# Patient Record
Sex: FEMALE | Race: ASIAN | NOT HISPANIC OR LATINO | URBAN - METROPOLITAN AREA
[De-identification: names, ages, dates, MRNs, and addresses within clinical notes are randomized per-mention and may not be internally consistent; named-entity substitution may affect disease eponyms.]

---

## 2022-10-08 ENCOUNTER — EMERGENCY (EMERGENCY)
Facility: HOSPITAL | Age: 42
LOS: 1 days | Discharge: HOME | End: 2022-10-08
Attending: EMERGENCY MEDICINE
Payer: MEDICARE

## 2022-10-08 VITALS
HEIGHT: 57 IN | TEMPERATURE: 99 F | RESPIRATION RATE: 20 BRPM | HEART RATE: 66 BPM | WEIGHT: 115.08 LBS | OXYGEN SATURATION: 100 % | SYSTOLIC BLOOD PRESSURE: 90 MMHG | DIASTOLIC BLOOD PRESSURE: 63 MMHG

## 2022-10-08 LAB
ALBUMIN SERPL ELPH-MCNC: 3.2 G/DL — LOW (ref 3.5–5)
ALP SERPL-CCNC: 87 U/L — SIGNIFICANT CHANGE UP (ref 40–120)
ALT FLD-CCNC: 43 U/L DA — SIGNIFICANT CHANGE UP (ref 10–60)
ANION GAP SERPL CALC-SCNC: 9 MMOL/L — SIGNIFICANT CHANGE UP (ref 5–17)
APTT BLD: 30.1 SEC — SIGNIFICANT CHANGE UP (ref 27.5–35.5)
AST SERPL-CCNC: 35 U/L — SIGNIFICANT CHANGE UP (ref 10–40)
BASOPHILS # BLD AUTO: 0.05 K/UL — SIGNIFICANT CHANGE UP (ref 0–0.2)
BASOPHILS NFR BLD AUTO: 0.3 % — SIGNIFICANT CHANGE UP (ref 0–2)
BILIRUB SERPL-MCNC: 0.4 MG/DL — SIGNIFICANT CHANGE UP (ref 0.2–1.2)
BUN SERPL-MCNC: 19 MG/DL — HIGH (ref 7–18)
CALCIUM SERPL-MCNC: 9.1 MG/DL — SIGNIFICANT CHANGE UP (ref 8.4–10.5)
CHLORIDE SERPL-SCNC: 100 MMOL/L — SIGNIFICANT CHANGE UP (ref 96–108)
CO2 SERPL-SCNC: 29 MMOL/L — SIGNIFICANT CHANGE UP (ref 22–31)
CREAT SERPL-MCNC: 1.08 MG/DL — SIGNIFICANT CHANGE UP (ref 0.5–1.3)
EGFR: 66 ML/MIN/1.73M2 — SIGNIFICANT CHANGE UP
EOSINOPHIL # BLD AUTO: 0 K/UL — SIGNIFICANT CHANGE UP (ref 0–0.5)
EOSINOPHIL NFR BLD AUTO: 0 % — SIGNIFICANT CHANGE UP (ref 0–6)
GLUCOSE SERPL-MCNC: 115 MG/DL — HIGH (ref 70–99)
HCG SERPL-ACNC: 1 MIU/ML — SIGNIFICANT CHANGE UP
HCT VFR BLD CALC: 43.2 % — SIGNIFICANT CHANGE UP (ref 34.5–45)
HGB BLD-MCNC: 14.6 G/DL — SIGNIFICANT CHANGE UP (ref 11.5–15.5)
IMM GRANULOCYTES NFR BLD AUTO: 0.5 % — SIGNIFICANT CHANGE UP (ref 0–0.9)
INR BLD: 1.17 RATIO — HIGH (ref 0.88–1.16)
LACTATE SERPL-SCNC: 1.8 MMOL/L — SIGNIFICANT CHANGE UP (ref 0.7–2)
LYMPHOCYTES # BLD AUTO: 1.24 K/UL — SIGNIFICANT CHANGE UP (ref 1–3.3)
LYMPHOCYTES # BLD AUTO: 7.3 % — LOW (ref 13–44)
MCHC RBC-ENTMCNC: 33.1 PG — SIGNIFICANT CHANGE UP (ref 27–34)
MCHC RBC-ENTMCNC: 33.8 GM/DL — SIGNIFICANT CHANGE UP (ref 32–36)
MCV RBC AUTO: 98 FL — SIGNIFICANT CHANGE UP (ref 80–100)
MONOCYTES # BLD AUTO: 0.71 K/UL — SIGNIFICANT CHANGE UP (ref 0–0.9)
MONOCYTES NFR BLD AUTO: 4.2 % — SIGNIFICANT CHANGE UP (ref 2–14)
NEUTROPHILS # BLD AUTO: 14.87 K/UL — HIGH (ref 1.8–7.4)
NEUTROPHILS NFR BLD AUTO: 87.7 % — HIGH (ref 43–77)
NRBC # BLD: 0 /100 WBCS — SIGNIFICANT CHANGE UP (ref 0–0)
PLATELET # BLD AUTO: 305 K/UL — SIGNIFICANT CHANGE UP (ref 150–400)
POTASSIUM SERPL-MCNC: 4.6 MMOL/L — SIGNIFICANT CHANGE UP (ref 3.5–5.3)
POTASSIUM SERPL-SCNC: 4.6 MMOL/L — SIGNIFICANT CHANGE UP (ref 3.5–5.3)
PROT SERPL-MCNC: 8.1 G/DL — SIGNIFICANT CHANGE UP (ref 6–8.3)
PROTHROM AB SERPL-ACNC: 13.9 SEC — HIGH (ref 10.5–13.4)
RBC # BLD: 4.41 M/UL — SIGNIFICANT CHANGE UP (ref 3.8–5.2)
RBC # FLD: 13 % — SIGNIFICANT CHANGE UP (ref 10.3–14.5)
SARS-COV-2 RNA SPEC QL NAA+PROBE: SIGNIFICANT CHANGE UP
SODIUM SERPL-SCNC: 138 MMOL/L — SIGNIFICANT CHANGE UP (ref 135–145)
TROPONIN I, HIGH SENSITIVITY RESULT: 4.8 NG/L — SIGNIFICANT CHANGE UP
WBC # BLD: 16.95 K/UL — HIGH (ref 3.8–10.5)
WBC # FLD AUTO: 16.95 K/UL — HIGH (ref 3.8–10.5)

## 2022-10-08 PROCEDURE — 71045 X-RAY EXAM CHEST 1 VIEW: CPT | Mod: 26

## 2022-10-08 PROCEDURE — 99285 EMERGENCY DEPT VISIT HI MDM: CPT

## 2022-10-08 RX ORDER — SODIUM CHLORIDE 9 MG/ML
1000 INJECTION INTRAMUSCULAR; INTRAVENOUS; SUBCUTANEOUS ONCE
Refills: 0 | Status: COMPLETED | OUTPATIENT
Start: 2022-10-08 | End: 2022-10-08

## 2022-10-08 RX ADMIN — SODIUM CHLORIDE 1000 MILLILITER(S): 9 INJECTION INTRAMUSCULAR; INTRAVENOUS; SUBCUTANEOUS at 21:10

## 2022-10-08 NOTE — ED PROVIDER NOTE - PROGRESS NOTE DETAILS
no source found. will treat as if strep. dw mom. pending cxs will dc. feeling better and wants to go home, reviewed case and results w mom, questions answered and she understands, advised return precautions and care plan.

## 2022-10-08 NOTE — ED PROVIDER NOTE - WET READ LAUNCH FT
[FreeTextEntry1] : 64 yo P2 for annual exam. Denies any complaints at this time, denies any PMB. Last mammogram was 10/2020, birads 2. Last colonoscopy 8 years ago. 
There are no Wet Read(s) to document.

## 2022-10-08 NOTE — ED PROVIDER NOTE - CLINICAL SUMMARY MEDICAL DECISION MAKING FREE TEXT BOX
Will start w/ basic blood work and COVID swab and chest X-ray. Patient is well-appearing and nonfocal exam. Will reassess after blood work if no other findings.

## 2022-10-08 NOTE — ED PROVIDER NOTE - NSFOLLOWUPINSTRUCTIONS_ED_ALL_ED_FT
IMPORTANT INSTRUCTIONS FROM Dr. RODRIGUEZ:    Please follow up with your personal medical doctor in 24-48 hours.   Bring results from today to your visit.    Antibiotics as prescribed.    If you were advised to take any medications - be sure to review the package insert.    If your symptoms change, get worse or if you have any new symptoms, come to the ER right away.  If you have any questions, call the ER at the phone number on this page.

## 2022-10-08 NOTE — ED PROVIDER NOTE - OBJECTIVE STATEMENT
42 y/o female, history of Down syndrome, brought into hospital by mom, is nonverbal and cannot contribute the history. Mom says that she has been "shaking" on Sunday when she was seen another hospital (about a week ago). She has been having a little bit more drooling than usual. No other focal symptoms, no sick contacts, no stomach pain; however, observing her coughing as she sits in the bed. Patient is having active chills in the bed. No known drug allergies.

## 2022-10-08 NOTE — ED PROVIDER NOTE - PATIENT PORTAL LINK FT
You can access the FollowMyHealth Patient Portal offered by [facility] by registering at the following website: [email or web address]. By joining [employer]’s FollowMyHealth portal, you will also be able to view your health information using other applications (apps) compatible with our system.

## 2022-10-08 NOTE — ED PROVIDER NOTE - CPE EDP RESP NORM
This patient was evaluated for sepsis.  At this time, a diagnosis of sepsis is not supported by the overall clinical picture. normal...

## 2022-10-09 VITALS
TEMPERATURE: 98 F | OXYGEN SATURATION: 95 % | DIASTOLIC BLOOD PRESSURE: 72 MMHG | SYSTOLIC BLOOD PRESSURE: 101 MMHG | HEART RATE: 69 BPM | RESPIRATION RATE: 18 BRPM

## 2022-10-09 LAB
RAPID RVP RESULT: SIGNIFICANT CHANGE UP
SARS-COV-2 RNA SPEC QL NAA+PROBE: SIGNIFICANT CHANGE UP

## 2022-10-09 PROCEDURE — 85730 THROMBOPLASTIN TIME PARTIAL: CPT

## 2022-10-09 PROCEDURE — 84702 CHORIONIC GONADOTROPIN TEST: CPT

## 2022-10-09 PROCEDURE — 96376 TX/PRO/DX INJ SAME DRUG ADON: CPT | Mod: XU

## 2022-10-09 PROCEDURE — 96374 THER/PROPH/DIAG INJ IV PUSH: CPT | Mod: XU

## 2022-10-09 PROCEDURE — 87040 BLOOD CULTURE FOR BACTERIA: CPT

## 2022-10-09 PROCEDURE — 80053 COMPREHEN METABOLIC PANEL: CPT

## 2022-10-09 PROCEDURE — 84484 ASSAY OF TROPONIN QUANT: CPT

## 2022-10-09 PROCEDURE — 70491 CT SOFT TISSUE NECK W/DYE: CPT | Mod: 26,MA

## 2022-10-09 PROCEDURE — 70491 CT SOFT TISSUE NECK W/DYE: CPT | Mod: MA

## 2022-10-09 PROCEDURE — 85025 COMPLETE CBC W/AUTO DIFF WBC: CPT

## 2022-10-09 PROCEDURE — 36415 COLL VENOUS BLD VENIPUNCTURE: CPT

## 2022-10-09 PROCEDURE — 83605 ASSAY OF LACTIC ACID: CPT

## 2022-10-09 PROCEDURE — 87635 SARS-COV-2 COVID-19 AMP PRB: CPT

## 2022-10-09 PROCEDURE — 85610 PROTHROMBIN TIME: CPT

## 2022-10-09 PROCEDURE — 99285 EMERGENCY DEPT VISIT HI MDM: CPT | Mod: 25

## 2022-10-09 PROCEDURE — 0225U NFCT DS DNA&RNA 21 SARSCOV2: CPT

## 2022-10-09 PROCEDURE — 71045 X-RAY EXAM CHEST 1 VIEW: CPT

## 2022-10-09 RX ORDER — AMOXICILLIN 250 MG/5ML
1 SUSPENSION, RECONSTITUTED, ORAL (ML) ORAL
Qty: 14 | Refills: 0
Start: 2022-10-09 | End: 2022-10-15

## 2022-10-09 RX ORDER — ACETAMINOPHEN 500 MG
650 TABLET ORAL ONCE
Refills: 0 | Status: COMPLETED | OUTPATIENT
Start: 2022-10-09 | End: 2022-10-09

## 2022-10-09 RX ADMIN — Medication 1 MILLIGRAM(S): at 00:55

## 2022-10-09 RX ADMIN — Medication 1 TABLET(S): at 02:59

## 2022-10-09 RX ADMIN — Medication 0.5 MILLIGRAM(S): at 00:10

## 2022-10-14 LAB
CULTURE RESULTS: SIGNIFICANT CHANGE UP
CULTURE RESULTS: SIGNIFICANT CHANGE UP
SPECIMEN SOURCE: SIGNIFICANT CHANGE UP
SPECIMEN SOURCE: SIGNIFICANT CHANGE UP

## 2022-10-18 PROBLEM — Z00.00 ENCOUNTER FOR PREVENTIVE HEALTH EXAMINATION: Status: ACTIVE | Noted: 2022-10-18

## 2022-10-20 ENCOUNTER — INPATIENT (INPATIENT)
Facility: HOSPITAL | Age: 42
LOS: 4 days | Discharge: HOME | DRG: 100 | End: 2022-10-25
Attending: STUDENT IN AN ORGANIZED HEALTH CARE EDUCATION/TRAINING PROGRAM | Admitting: STUDENT IN AN ORGANIZED HEALTH CARE EDUCATION/TRAINING PROGRAM
Payer: MEDICARE

## 2022-10-20 VITALS
SYSTOLIC BLOOD PRESSURE: 103 MMHG | DIASTOLIC BLOOD PRESSURE: 48 MMHG | HEIGHT: 57 IN | HEART RATE: 60 BPM | WEIGHT: 119.93 LBS | RESPIRATION RATE: 22 BRPM | OXYGEN SATURATION: 100 %

## 2022-10-20 LAB
ALBUMIN SERPL ELPH-MCNC: 2.8 G/DL — LOW (ref 3.5–5)
ALP SERPL-CCNC: 79 U/L — SIGNIFICANT CHANGE UP (ref 40–120)
ALT FLD-CCNC: 40 U/L DA — SIGNIFICANT CHANGE UP (ref 10–60)
ANION GAP SERPL CALC-SCNC: 7 MMOL/L — SIGNIFICANT CHANGE UP (ref 5–17)
APPEARANCE UR: CLEAR — SIGNIFICANT CHANGE UP
AST SERPL-CCNC: 53 U/L — HIGH (ref 10–40)
BASOPHILS # BLD AUTO: 0.04 K/UL — SIGNIFICANT CHANGE UP (ref 0–0.2)
BASOPHILS NFR BLD AUTO: 1 % — SIGNIFICANT CHANGE UP (ref 0–2)
BILIRUB SERPL-MCNC: 0.3 MG/DL — SIGNIFICANT CHANGE UP (ref 0.2–1.2)
BILIRUB UR-MCNC: NEGATIVE — SIGNIFICANT CHANGE UP
BUN SERPL-MCNC: 22 MG/DL — HIGH (ref 7–18)
CALCIUM SERPL-MCNC: 8.3 MG/DL — LOW (ref 8.4–10.5)
CHLORIDE SERPL-SCNC: 104 MMOL/L — SIGNIFICANT CHANGE UP (ref 96–108)
CO2 SERPL-SCNC: 26 MMOL/L — SIGNIFICANT CHANGE UP (ref 22–31)
COLOR SPEC: YELLOW — SIGNIFICANT CHANGE UP
CREAT SERPL-MCNC: 1 MG/DL — SIGNIFICANT CHANGE UP (ref 0.5–1.3)
DIFF PNL FLD: NEGATIVE — SIGNIFICANT CHANGE UP
EGFR: 73 ML/MIN/1.73M2 — SIGNIFICANT CHANGE UP
EOSINOPHIL # BLD AUTO: 0.01 K/UL — SIGNIFICANT CHANGE UP (ref 0–0.5)
EOSINOPHIL NFR BLD AUTO: 0.3 % — SIGNIFICANT CHANGE UP (ref 0–6)
GLUCOSE SERPL-MCNC: 89 MG/DL — SIGNIFICANT CHANGE UP (ref 70–99)
GLUCOSE UR QL: NEGATIVE — SIGNIFICANT CHANGE UP
HCT VFR BLD CALC: 43.3 % — SIGNIFICANT CHANGE UP (ref 34.5–45)
HGB BLD-MCNC: 14.4 G/DL — SIGNIFICANT CHANGE UP (ref 11.5–15.5)
IMM GRANULOCYTES NFR BLD AUTO: 0.5 % — SIGNIFICANT CHANGE UP (ref 0–0.9)
KETONES UR-MCNC: NEGATIVE — SIGNIFICANT CHANGE UP
LEUKOCYTE ESTERASE UR-ACNC: NEGATIVE — SIGNIFICANT CHANGE UP
LYMPHOCYTES # BLD AUTO: 1.74 K/UL — SIGNIFICANT CHANGE UP (ref 1–3.3)
LYMPHOCYTES # BLD AUTO: 43.5 % — SIGNIFICANT CHANGE UP (ref 13–44)
MAGNESIUM SERPL-MCNC: 2.1 MG/DL — SIGNIFICANT CHANGE UP (ref 1.6–2.6)
MCHC RBC-ENTMCNC: 32.7 PG — SIGNIFICANT CHANGE UP (ref 27–34)
MCHC RBC-ENTMCNC: 33.3 GM/DL — SIGNIFICANT CHANGE UP (ref 32–36)
MCV RBC AUTO: 98.4 FL — SIGNIFICANT CHANGE UP (ref 80–100)
MONOCYTES # BLD AUTO: 0.38 K/UL — SIGNIFICANT CHANGE UP (ref 0–0.9)
MONOCYTES NFR BLD AUTO: 9.5 % — SIGNIFICANT CHANGE UP (ref 2–14)
NEUTROPHILS # BLD AUTO: 1.81 K/UL — SIGNIFICANT CHANGE UP (ref 1.8–7.4)
NEUTROPHILS NFR BLD AUTO: 45.2 % — SIGNIFICANT CHANGE UP (ref 43–77)
NITRITE UR-MCNC: NEGATIVE — SIGNIFICANT CHANGE UP
NRBC # BLD: 0 /100 WBCS — SIGNIFICANT CHANGE UP (ref 0–0)
PH UR: 7 — SIGNIFICANT CHANGE UP (ref 5–8)
PLATELET # BLD AUTO: 181 K/UL — SIGNIFICANT CHANGE UP (ref 150–400)
POTASSIUM SERPL-MCNC: 5.3 MMOL/L — SIGNIFICANT CHANGE UP (ref 3.5–5.3)
POTASSIUM SERPL-SCNC: 5.3 MMOL/L — SIGNIFICANT CHANGE UP (ref 3.5–5.3)
PROT SERPL-MCNC: 7.2 G/DL — SIGNIFICANT CHANGE UP (ref 6–8.3)
PROT UR-MCNC: NEGATIVE — SIGNIFICANT CHANGE UP
RBC # BLD: 4.4 M/UL — SIGNIFICANT CHANGE UP (ref 3.8–5.2)
RBC # FLD: 12.7 % — SIGNIFICANT CHANGE UP (ref 10.3–14.5)
SARS-COV-2 RNA SPEC QL NAA+PROBE: SIGNIFICANT CHANGE UP
SODIUM SERPL-SCNC: 137 MMOL/L — SIGNIFICANT CHANGE UP (ref 135–145)
SP GR SPEC: 1 — LOW (ref 1.01–1.02)
UROBILINOGEN FLD QL: NEGATIVE — SIGNIFICANT CHANGE UP
VALPROATE SERPL-MCNC: 80 UG/ML — SIGNIFICANT CHANGE UP (ref 50–100)
WBC # BLD: 4 K/UL — SIGNIFICANT CHANGE UP (ref 3.8–10.5)
WBC # FLD AUTO: 4 K/UL — SIGNIFICANT CHANGE UP (ref 3.8–10.5)

## 2022-10-20 PROCEDURE — 71045 X-RAY EXAM CHEST 1 VIEW: CPT | Mod: 26

## 2022-10-20 PROCEDURE — 99285 EMERGENCY DEPT VISIT HI MDM: CPT

## 2022-10-20 RX ORDER — ACETAMINOPHEN 500 MG
1000 TABLET ORAL ONCE
Refills: 0 | Status: COMPLETED | OUTPATIENT
Start: 2022-10-20 | End: 2022-10-20

## 2022-10-20 RX ORDER — VALPROIC ACID (AS SODIUM SALT) 250 MG/5ML
500 SOLUTION, ORAL ORAL ONCE
Refills: 0 | Status: COMPLETED | OUTPATIENT
Start: 2022-10-20 | End: 2022-10-20

## 2022-10-20 RX ORDER — VALPROIC ACID (AS SODIUM SALT) 250 MG/5ML
500 SOLUTION, ORAL ORAL ONCE
Refills: 0 | Status: DISCONTINUED | OUTPATIENT
Start: 2022-10-20 | End: 2022-10-20

## 2022-10-20 RX ORDER — MIDAZOLAM HYDROCHLORIDE 1 MG/ML
2 INJECTION, SOLUTION INTRAMUSCULAR; INTRAVENOUS ONCE
Refills: 0 | Status: DISCONTINUED | OUTPATIENT
Start: 2022-10-20 | End: 2022-10-20

## 2022-10-20 RX ADMIN — Medication 400 MILLIGRAM(S): at 22:33

## 2022-10-20 RX ADMIN — Medication 1000 MILLIGRAM(S): at 23:03

## 2022-10-20 RX ADMIN — Medication 500 MILLIGRAM(S): at 21:32

## 2022-10-20 RX ADMIN — MIDAZOLAM HYDROCHLORIDE 2 MILLIGRAM(S): 1 INJECTION, SOLUTION INTRAMUSCULAR; INTRAVENOUS at 21:37

## 2022-10-20 RX ADMIN — Medication 2 MILLIGRAM(S): at 22:33

## 2022-10-20 NOTE — ED ADULT NURSE NOTE - NSIMPLEMENTINTERV_GEN_ALL_ED
Implemented All Universal Safety Interventions:  [place] to call system. Call bell, personal items and telephone within reach. Instruct patient to call for assistance. Room bathroom lighting operational. Non-slip footwear when patient is off stretcher. Physically safe environment: no spills, clutter or unnecessary equipment. Stretcher in lowest position, wheels locked, appropriate side rails in place.

## 2022-10-20 NOTE — ED PROVIDER NOTE - CLINICAL SUMMARY MEDICAL DECISION MAKING FREE TEXT BOX
patient with seizure like activity today. plan for labs CT scan and admit for neuro workup patient with seizure like activity today. plan for labs CT scan and admit for neuro workup.    [place] 0343:  Pt s/o from Dr Red pending CT head and admission. Multiple meds required to obtain CT head as pt persistently moving and not following commands. CT showing no acute path. Giving keppra IVPB. Pt stable and endorsed to inpatient team.

## 2022-10-20 NOTE — CONSULT NOTE ADULT - SUBJECTIVE AND OBJECTIVE BOX
NEUROLOGY CONSULT NOTE    NAME:  BRENDA RUSSO      ASSESSMENT:  41F with concern for recurrent seizures in the setting of Down syndrome and early-onset dementia      RECOMMENDATIONS:    - Follow up CT Head for any acute intracranial abnormalities    - Routine EEG approved to evaluate for subclinical seizures    - Continue Valproic acid syrup (Depakene) 500mg PO BID (or Valproate 500mg IV BID if not tolerating PO medications) for seizure prophylaxis - Valproic acid level is within the normal range    - If Routine EEG reveals subclinical seizures, Valproic acid dosage can be increased to 750mg PO BID    - Continue home Donepezil 5mg PO Daily to help slow down cognitive decline    - Complete infectious and metabolic workup for potential triggers for breakthrough seizure, and treat as appropriate    - PT/OT as tolerated    - DVT ppx: SCDs, Enoxaparin or Heparin          NOTE TO BE COMPLETED - PLEASE REFER TO ABOVE ONLY AND IGNORE INFORMATION BELOW    *******************************      CHIEF COMPLAINT:  Patient is a 41y old  Female who presents with a chief complaint of     HPI / NEURO HPI:  History is provided by the patient's mother at bedside. This is a 41-year-old woman with Down syndrome and early-onset dementia, on Donepezil, who presented to the ED on 10/8/22 with generalized shaking, and was diagnosed with seizure and started on Valproic acid 500mg PO BID. She presented today with another witnessed episode of generalized shaking.      PAST MEDICAL & SURGICAL HISTORY:  Down syndrome  Seizure  No significant past surgical history      MEDICATIONS:  Valproic acid 500mg PO BID      ALLERGIES:  No Known Allergies      FAMILY HISTORY:        SOCIAL HISTORY:  Denies alcohol, tobacco, or illicit drug use      REVIEW OF SYSTEMS:  GENERAL: No fever, weight changes, fatigue  EYES: No eye pain or discharge  EAR/NOSE/MOUTH/THROAT: No sinus or throat pain; No difficulty hearing  NECK: No pain or stiffness  RESPIRATORY: No cough, wheezing, chills, or hemoptysis  CARDIOVASCULAR: No chest pain, palpitations, shortness of breath, or dyspnea on exertion  GASTROINTESTINAL: No abdominal pain, nausea, vomiting, hematemesis, diarrhea, or constipation  GENITOURINARY: No dysuria, frequency, hematuria, or incontinence  SKIN: No rashes or lesions  ENDOCRINE: No heat or cold intolerance  HEMATOLOGIC: No easy bruising or bleeding  PSYCHIATRIC: No depression, anxiety, or mood swings  MUSCULOSKELETAL: No joint pain or swelling  NEUROLOGICAL: As per HPI        OBJECTIVE:    Vital Signs Last 24 Hrs  T(C): 36.8 (21 Oct 2022 00:55), Max: 36.8 (20 Oct 2022 20:50)  T(F): 98.3 (21 Oct 2022 00:55), Max: 98.3 (20 Oct 2022 20:50)  HR: 93 (21 Oct 2022 02:35) (60 - 93)  BP: 99/58 (21 Oct 2022 02:35) (99/58 - 114/50)  BP(mean): 71 (21 Oct 2022 02:35) (71 - 71)  RR: 20 (21 Oct 2022 02:35) (18 - 22)  SpO2: 100% (21 Oct 2022 02:35) (95% - 100%)  Parameters below as of 21 Oct 2022 02:35  Patient On (Oxygen Delivery Method): room air    General Examination:  General: No acute distress  HEENT: Atraumatic, Normocephalic, Mildly dysmorphic facies present  Respiratory: Rapid rate, Diminished breath sounds b/l  Cardiovascular: RRR.  Normal S1 & S2.  Normal b/l radial and pedal pulses.    Neurological Examination:  General / Mental Status: AAO x 3.  No aphasia or dysarthria.  Naming and repetition intact.  Cranial Nerves: VFF x 4.  PERRL.  EOMI x 2, No nystagmus or diplopia.  B/l V1-V3 equal and intact to light touch and pinprick.  Symmetric facial movement and palate elevation.  B/l hearing equal to finger rub.  5/5 strength with b/l sternocleidomastoid & trapezius.  Midline tongue protrusion, with no atrophy or fasciculations.  Motor: Normal bulk & tone in all four extremities.  5/5 strength throughout all four extremities.  No downward drift, rigidity, spasticity, or tremors in any of the four extremities.  Sensory: Intact to light touch and pinprick in all four extremities.  Negative Romberg.  Reflex: 2+ and symmetric at b/l biceps, triceps, brachioradialis, patellae, and ankles.  Downgoing toes b/l.  Coordination: No dysmetria with b/l finger-to-nose and heel raise tests.  Symmetric rapid alternating movements b/l.  Gait: Normal, narrow-based gait.  No difficulty with tiptoe, heel, and tandem gaits.        LABORATORY VALUES:                        14.4   4.00  )-----------( 181      ( 20 Oct 2022 19:45 )             43.3       10-20    137  |  104  |  22<H>  ----------------------------<  89  5.3   |  26  |  1.00    Ca    8.3<L>      20 Oct 2022 19:45  Mg     2.1     10-20    TPro  7.2  /  Alb  2.8<L>  /  TBili  0.3  /  DBili  x   /  AST  53<H>  /  ALT  40  /  AlkPhos  79  10-20  Valproic Acid Level, Serum: 80 ug/mL (10.20.22 @ 20:10)     Urinalysis (10.20.22 @ 23:10)   pH Urine: 7.0   Glucose Qualitative, Urine: Negative   Blood, Urine: Negative   Color: Yellow   Urine Appearance: Clear   Bilirubin: Negative   Ketone - Urine: Negative   Specific Gravity: 1.005   Protein, Urine: Negative   Urobilinogen: Negative   Nitrite: Negative   Leukocyte Esterase Concentration: Negative               NEUROIMAGING:          Please contact the Neurology consult service with any neurological questions.    Sourav Cardenas MD   of Neurology  [facility] at [facility] NEUROLOGY CONSULT NOTE    NAME:  BRENDA RUSSO      ASSESSMENT:  41F with concern for recurrent seizures in the setting of Down syndrome and early-onset dementia      RECOMMENDATIONS:    - Follow up CT Head for any acute intracranial abnormalities    - Routine EEG approved to evaluate for subclinical seizures    - Continue Valproic acid syrup (Depakene) 500mg PO BID (or Valproate 500mg IV BID if not tolerating PO medications) for seizure prophylaxis - Valproic acid level is within the normal range    - If Routine EEG reveals subclinical seizures, Valproic acid dosage can be increased to 750mg PO/IV BID    - Continue home Donepezil 5mg PO Daily to help slow down cognitive decline    - Complete infectious and metabolic workup for potential triggers for breakthrough seizure, and treat as appropriate    - PT/OT as tolerated    - DVT ppx: SCDs, Enoxaparin or Heparin            *****************************      CHIEF COMPLAINT:  Patient is a 41y old  Female who presents with a chief complaint of seizure    HPI / NEURO HPI:  History is provided by the patient's mother at bedside. This is a 41-year-old woman with Down syndrome and early-onset dementia, on Donepezil, who presented to the ED on 10/8/22 with generalized shaking, and was diagnosed with seizure and started on Valproic acid 500mg PO BID. She presented today with another witnessed episode of generalized shaking.      PAST MEDICAL & SURGICAL HISTORY:  Down syndrome  Seizure  No significant past surgical history      MEDICATIONS:  Valproic acid 500mg PO BID      ALLERGIES:  No Known Allergies      FAMILY HISTORY:  No reported family history of seizure      SOCIAL HISTORY:  No reported alcohol, tobacco, or illicit drug use      REVIEW OF SYSTEMS: Limited due to encephalopathy  GENERAL: No fever  EYES: No eye discharge  EAR/NOSE/MOUTH/THROAT: No sinus discharge  NECK: No stiffness  RESPIRATORY: No cough  CARDIOVASCULAR: No shortness of breath  GASTROINTESTINAL: No nausea, vomiting, hematemesis  GENITOURINARY: No frequency, hematuria  SKIN: No rashes or lesions  ENDOCRINE: No reported heat or cold intolerance  HEMATOLOGIC: No reported easy bruising or bleeding  PSYCHIATRIC: Early-onset dementia present  MUSCULOSKELETAL: No joint swelling  NEUROLOGICAL: As per HPI          OBJECTIVE:    Vital Signs Last 24 Hrs  T(C): 36.8 (21 Oct 2022 00:55), Max: 36.8 (20 Oct 2022 20:50)  T(F): 98.3 (21 Oct 2022 00:55), Max: 98.3 (20 Oct 2022 20:50)  HR: 93 (21 Oct 2022 02:35) (60 - 93)  BP: 99/58 (21 Oct 2022 02:35) (99/58 - 114/50)  BP(mean): 71 (21 Oct 2022 02:35) (71 - 71)  RR: 20 (21 Oct 2022 02:35) (18 - 22)  SpO2: 100% (21 Oct 2022 02:35) (95% - 100%)  Parameters below as of 21 Oct 2022 02:35  Patient On (Oxygen Delivery Method): room air    General Exam:  General: No apparent acute distress  Respiratory: Rapid rate, Diminished breath sounds b/l  Cardiovascular: RRR, Weak b/l radial and pedal pulses    Neurological Exam:  General / Mental Status: Nonverbal, Does not follow commands.  Neurological exam is limited.  Cranial Nerves: PERRL, Blink to threat sluggish b/l, Does not track finger movements with eyes b/l.  Grimaces to pinprick at b/l V1-V3.  No response to snap at b/l ears.  No apparent facial asymmetry.  Midline palate and tongue.  No resistance to passive motion of neck b/l.  Motor: Diminished bulk and tone in all four extremities.  All four extremities drift to bed after passive elevation.  No spontaneous movement, rigidity, or spasticity in any of the four extremities.  Sensation: Withdraws all four extremities to pinch.  Reflexes: 1+ and symmetric at b/l biceps, triceps, brachioradialis, patellae, and ankles.  Toes mute b/l.  Unable to assess coordination, Romberg sign, or gait due to encephalopathy.          LABORATORY VALUES:                        14.4   4.00  )-----------( 181      ( 20 Oct 2022 19:45 )             43.3       10-20    137  |  104  |  22<H>  ----------------------------<  89  5.3   |  26  |  1.00    Ca    8.3<L>      20 Oct 2022 19:45  Mg     2.1     10-20    TPro  7.2  /  Alb  2.8<L>  /  TBili  0.3  /  DBili  x   /  AST  53<H>  /  ALT  40  /  AlkPhos  79  10-20  Valproic Acid Level, Serum: 80 ug/mL (10.20.22 @ 20:10)     Urinalysis (10.20.22 @ 23:10)   pH Urine: 7.0   Glucose Qualitative, Urine: Negative   Blood, Urine: Negative   Color: Yellow   Urine Appearance: Clear   Bilirubin: Negative   Ketone - Urine: Negative   Specific Gravity: 1.005   Protein, Urine: Negative   Urobilinogen: Negative   Nitrite: Negative   Leukocyte Esterase Concentration: Negative             NEUROIMAGING: None recent              Please contact the Neurology consult service with any neurological questions.    Sourav Cardenas MD   of Neurology  [facility]

## 2022-10-20 NOTE — CONSULT NOTE ADULT - TIME BILLING
I counseled the patient's mother at bedside and the primary team about the further testing indicated for breakthrough seizure workup.

## 2022-10-20 NOTE — ED PROVIDER NOTE - OBJECTIVE STATEMENT
Reason for Call:  Call back    Detailed comments: Patients son called in to leave a message for Emmy and care team. States Emmy is aware patient is on short term disability, asks that the team reach out to his dad in regards to therapy services. Wants recommendations as Emmy knows all of his current issues, but is also wanting to make sure it would be covered by insurance. Did advise to check with insurance for coverage.     Phone Number Patient can be reached at: Home number on file [phone] (home)    Best Time: Open    Can we leave a detailed message on this number? YES    Call taken on 5/16/2022 at 12:40 PM by Teresa Trejo     41-year-old female with Down syndrome brought in by ambulance for seizure-like activity.  According to triage note EMS witnessed seizure like activity for 20 minutes.  Mom states for the past few weeks the patient has been having generalized weakness and seem to be uncomfortable which has gotten worse over the last several days.  Several weeks ago she was started on Keppra 500 mg twice a day.  Patient does not have a neurologist.  Mother notes good appetite no vomiting no diarrhea no fever no cough.  Patient is nonverbal for the last several years since COVID.  Mother denies trauma.  Mother notes the patient is having too much shaking.

## 2022-10-20 NOTE — ED PROVIDER NOTE - PROGRESS NOTE DETAILS
I spoke with Dr Cardenas who will see patient in am. recommended regular keppra dose. patient required versed then ativan for anxiolysis. pending CT and urinalysis, then admit for seizure workup

## 2022-10-20 NOTE — ED PROVIDER NOTE - PHYSICAL EXAMINATION
Awake, nonfocal neuro exam appears uncomfortable, abdomen nontender, no rash no petechiae. no bruising. notes involuntary hand shaking on and off (not seizure like)

## 2022-10-21 DIAGNOSIS — Z29.9 ENCOUNTER FOR PROPHYLACTIC MEASURES, UNSPECIFIED: ICD-10-CM

## 2022-10-21 DIAGNOSIS — R56.9 UNSPECIFIED CONVULSIONS: ICD-10-CM

## 2022-10-21 DIAGNOSIS — R10.9 UNSPECIFIED ABDOMINAL PAIN: ICD-10-CM

## 2022-10-21 DIAGNOSIS — Z98.51 TUBAL LIGATION STATUS: Chronic | ICD-10-CM

## 2022-10-21 LAB
AMPHET UR-MCNC: NEGATIVE — SIGNIFICANT CHANGE UP
ANION GAP SERPL CALC-SCNC: 5 MMOL/L — SIGNIFICANT CHANGE UP (ref 5–17)
APPEARANCE UR: CLEAR — SIGNIFICANT CHANGE UP
BACTERIA # UR AUTO: ABNORMAL /HPF
BARBITURATES UR SCN-MCNC: NEGATIVE — SIGNIFICANT CHANGE UP
BENZODIAZ UR-MCNC: POSITIVE
BILIRUB UR-MCNC: NEGATIVE — SIGNIFICANT CHANGE UP
BUN SERPL-MCNC: 16 MG/DL — SIGNIFICANT CHANGE UP (ref 7–18)
CALCIUM SERPL-MCNC: 8.4 MG/DL — SIGNIFICANT CHANGE UP (ref 8.4–10.5)
CHLORIDE SERPL-SCNC: 106 MMOL/L — SIGNIFICANT CHANGE UP (ref 96–108)
CO2 SERPL-SCNC: 31 MMOL/L — SIGNIFICANT CHANGE UP (ref 22–31)
COCAINE METAB.OTHER UR-MCNC: NEGATIVE — SIGNIFICANT CHANGE UP
COLOR SPEC: YELLOW — SIGNIFICANT CHANGE UP
CREAT SERPL-MCNC: 0.97 MG/DL — SIGNIFICANT CHANGE UP (ref 0.5–1.3)
DIFF PNL FLD: NEGATIVE — SIGNIFICANT CHANGE UP
EGFR: 75 ML/MIN/1.73M2 — SIGNIFICANT CHANGE UP
EPI CELLS # UR: ABNORMAL /HPF
GLUCOSE SERPL-MCNC: 79 MG/DL — SIGNIFICANT CHANGE UP (ref 70–99)
GLUCOSE UR QL: NEGATIVE — SIGNIFICANT CHANGE UP
HCT VFR BLD CALC: 40.4 % — SIGNIFICANT CHANGE UP (ref 34.5–45)
HGB BLD-MCNC: 13.3 G/DL — SIGNIFICANT CHANGE UP (ref 11.5–15.5)
KETONES UR-MCNC: NEGATIVE — SIGNIFICANT CHANGE UP
LEUKOCYTE ESTERASE UR-ACNC: NEGATIVE — SIGNIFICANT CHANGE UP
MAGNESIUM SERPL-MCNC: 2.1 MG/DL — SIGNIFICANT CHANGE UP (ref 1.6–2.6)
MCHC RBC-ENTMCNC: 32.9 GM/DL — SIGNIFICANT CHANGE UP (ref 32–36)
MCHC RBC-ENTMCNC: 33.1 PG — SIGNIFICANT CHANGE UP (ref 27–34)
MCV RBC AUTO: 100.5 FL — HIGH (ref 80–100)
METHADONE UR-MCNC: NEGATIVE — SIGNIFICANT CHANGE UP
NITRITE UR-MCNC: NEGATIVE — SIGNIFICANT CHANGE UP
NRBC # BLD: 0 /100 WBCS — SIGNIFICANT CHANGE UP (ref 0–0)
OPIATES UR-MCNC: NEGATIVE — SIGNIFICANT CHANGE UP
PCP SPEC-MCNC: SIGNIFICANT CHANGE UP
PCP UR-MCNC: NEGATIVE — SIGNIFICANT CHANGE UP
PH UR: 7 — SIGNIFICANT CHANGE UP (ref 5–8)
PHOSPHATE SERPL-MCNC: 3.5 MG/DL — SIGNIFICANT CHANGE UP (ref 2.5–4.5)
PLATELET # BLD AUTO: 169 K/UL — SIGNIFICANT CHANGE UP (ref 150–400)
POTASSIUM SERPL-MCNC: 4.7 MMOL/L — SIGNIFICANT CHANGE UP (ref 3.5–5.3)
POTASSIUM SERPL-SCNC: 4.7 MMOL/L — SIGNIFICANT CHANGE UP (ref 3.5–5.3)
PROT UR-MCNC: 15
RBC # BLD: 4.02 M/UL — SIGNIFICANT CHANGE UP (ref 3.8–5.2)
RBC # FLD: 13 % — SIGNIFICANT CHANGE UP (ref 10.3–14.5)
RBC CASTS # UR COMP ASSIST: SIGNIFICANT CHANGE UP /HPF (ref 0–2)
SODIUM SERPL-SCNC: 142 MMOL/L — SIGNIFICANT CHANGE UP (ref 135–145)
SP GR SPEC: 1 — LOW (ref 1.01–1.02)
THC UR QL: NEGATIVE — SIGNIFICANT CHANGE UP
UROBILINOGEN FLD QL: NEGATIVE — SIGNIFICANT CHANGE UP
WBC # BLD: 7.76 K/UL — SIGNIFICANT CHANGE UP (ref 3.8–10.5)
WBC # FLD AUTO: 7.76 K/UL — SIGNIFICANT CHANGE UP (ref 3.8–10.5)
WBC UR QL: SIGNIFICANT CHANGE UP /HPF (ref 0–5)

## 2022-10-21 PROCEDURE — 12345: CPT | Mod: NC

## 2022-10-21 PROCEDURE — 99223 1ST HOSP IP/OBS HIGH 75: CPT

## 2022-10-21 PROCEDURE — 95816 EEG AWAKE AND DROWSY: CPT | Mod: 26

## 2022-10-21 PROCEDURE — 74177 CT ABD & PELVIS W/CONTRAST: CPT | Mod: 26,MA

## 2022-10-21 PROCEDURE — 99223 1ST HOSP IP/OBS HIGH 75: CPT | Mod: GC

## 2022-10-21 PROCEDURE — 70450 CT HEAD/BRAIN W/O DYE: CPT | Mod: 26,QQ

## 2022-10-21 RX ORDER — POLYETHYLENE GLYCOL 3350 17 G/17G
17 POWDER, FOR SOLUTION ORAL DAILY
Refills: 0 | Status: DISCONTINUED | OUTPATIENT
Start: 2022-10-21 | End: 2022-10-25

## 2022-10-21 RX ORDER — VALPROIC ACID (AS SODIUM SALT) 250 MG/5ML
250 SOLUTION, ORAL ORAL
Refills: 0 | Status: DISCONTINUED | OUTPATIENT
Start: 2022-10-21 | End: 2022-10-21

## 2022-10-21 RX ORDER — MIDAZOLAM HYDROCHLORIDE 1 MG/ML
2 INJECTION, SOLUTION INTRAMUSCULAR; INTRAVENOUS ONCE
Refills: 0 | Status: DISCONTINUED | OUTPATIENT
Start: 2022-10-21 | End: 2022-10-21

## 2022-10-21 RX ORDER — HYDROXYZINE HCL 10 MG
25 TABLET ORAL THREE TIMES A DAY
Refills: 0 | Status: DISCONTINUED | OUTPATIENT
Start: 2022-10-21 | End: 2022-10-25

## 2022-10-21 RX ORDER — ENOXAPARIN SODIUM 100 MG/ML
40 INJECTION SUBCUTANEOUS EVERY 24 HOURS
Refills: 0 | Status: DISCONTINUED | OUTPATIENT
Start: 2022-10-21 | End: 2022-10-25

## 2022-10-21 RX ORDER — DONEPEZIL HYDROCHLORIDE 10 MG/1
5 TABLET, FILM COATED ORAL AT BEDTIME
Refills: 0 | Status: DISCONTINUED | OUTPATIENT
Start: 2022-10-21 | End: 2022-10-25

## 2022-10-21 RX ORDER — VALPROIC ACID (AS SODIUM SALT) 250 MG/5ML
500 SOLUTION, ORAL ORAL
Refills: 0 | Status: DISCONTINUED | OUTPATIENT
Start: 2022-10-21 | End: 2022-10-21

## 2022-10-21 RX ORDER — DIPHENHYDRAMINE HCL 50 MG
25 CAPSULE ORAL ONCE
Refills: 0 | Status: COMPLETED | OUTPATIENT
Start: 2022-10-21 | End: 2022-10-21

## 2022-10-21 RX ORDER — SODIUM CHLORIDE 9 MG/ML
500 INJECTION INTRAMUSCULAR; INTRAVENOUS; SUBCUTANEOUS ONCE
Refills: 0 | Status: COMPLETED | OUTPATIENT
Start: 2022-10-21 | End: 2022-10-21

## 2022-10-21 RX ORDER — FERROUS SULFATE 325(65) MG
325 TABLET ORAL DAILY
Refills: 0 | Status: DISCONTINUED | OUTPATIENT
Start: 2022-10-21 | End: 2022-10-25

## 2022-10-21 RX ORDER — ACETAMINOPHEN 500 MG
650 TABLET ORAL EVERY 6 HOURS
Refills: 0 | Status: DISCONTINUED | OUTPATIENT
Start: 2022-10-21 | End: 2022-10-25

## 2022-10-21 RX ORDER — HALOPERIDOL DECANOATE 100 MG/ML
2.5 INJECTION INTRAMUSCULAR ONCE
Refills: 0 | Status: COMPLETED | OUTPATIENT
Start: 2022-10-21 | End: 2022-10-21

## 2022-10-21 RX ORDER — FLUTICASONE PROPIONATE 50 MCG
1 SPRAY, SUSPENSION NASAL
Refills: 0 | Status: DISCONTINUED | OUTPATIENT
Start: 2022-10-21 | End: 2022-10-25

## 2022-10-21 RX ORDER — LEVETIRACETAM 250 MG/1
1000 TABLET, FILM COATED ORAL ONCE
Refills: 0 | Status: COMPLETED | OUTPATIENT
Start: 2022-10-21 | End: 2022-10-21

## 2022-10-21 RX ORDER — ARIPIPRAZOLE 15 MG/1
2 TABLET ORAL DAILY
Refills: 0 | Status: DISCONTINUED | OUTPATIENT
Start: 2022-10-21 | End: 2022-10-25

## 2022-10-21 RX ORDER — VALPROIC ACID (AS SODIUM SALT) 250 MG/5ML
500 SOLUTION, ORAL ORAL
Refills: 0 | Status: DISCONTINUED | OUTPATIENT
Start: 2022-10-21 | End: 2022-10-22

## 2022-10-21 RX ORDER — TRAZODONE HCL 50 MG
100 TABLET ORAL AT BEDTIME
Refills: 0 | Status: DISCONTINUED | OUTPATIENT
Start: 2022-10-21 | End: 2022-10-25

## 2022-10-21 RX ORDER — SENNA PLUS 8.6 MG/1
2 TABLET ORAL AT BEDTIME
Refills: 0 | Status: DISCONTINUED | OUTPATIENT
Start: 2022-10-21 | End: 2022-10-25

## 2022-10-21 RX ADMIN — SODIUM CHLORIDE 1000 MILLILITER(S): 9 INJECTION INTRAMUSCULAR; INTRAVENOUS; SUBCUTANEOUS at 21:16

## 2022-10-21 RX ADMIN — ENOXAPARIN SODIUM 40 MILLIGRAM(S): 100 INJECTION SUBCUTANEOUS at 08:46

## 2022-10-21 RX ADMIN — HALOPERIDOL DECANOATE 2.5 MILLIGRAM(S): 100 INJECTION INTRAMUSCULAR at 00:31

## 2022-10-21 RX ADMIN — Medication 1 ENEMA: at 13:11

## 2022-10-21 RX ADMIN — SODIUM CHLORIDE 500 MILLILITER(S): 9 INJECTION INTRAMUSCULAR; INTRAVENOUS; SUBCUTANEOUS at 18:38

## 2022-10-21 RX ADMIN — Medication 1 SPRAY(S): at 06:01

## 2022-10-21 RX ADMIN — Medication 2 MILLIGRAM(S): at 13:36

## 2022-10-21 RX ADMIN — Medication 1 SPRAY(S): at 18:10

## 2022-10-21 RX ADMIN — POLYETHYLENE GLYCOL 3350 17 GRAM(S): 17 POWDER, FOR SOLUTION ORAL at 13:07

## 2022-10-21 RX ADMIN — Medication 25 MILLIGRAM(S): at 02:36

## 2022-10-21 RX ADMIN — Medication 1 MILLIGRAM(S): at 06:02

## 2022-10-21 RX ADMIN — Medication 325 MILLIGRAM(S): at 13:06

## 2022-10-21 RX ADMIN — SODIUM CHLORIDE 500 MILLILITER(S): 9 INJECTION INTRAMUSCULAR; INTRAVENOUS; SUBCUTANEOUS at 07:06

## 2022-10-21 RX ADMIN — MIDAZOLAM HYDROCHLORIDE 2 MILLIGRAM(S): 1 INJECTION, SOLUTION INTRAMUSCULAR; INTRAVENOUS at 03:12

## 2022-10-21 RX ADMIN — ARIPIPRAZOLE 2 MILLIGRAM(S): 15 TABLET ORAL at 13:04

## 2022-10-21 RX ADMIN — Medication 500 MILLIGRAM(S): at 08:39

## 2022-10-21 RX ADMIN — LEVETIRACETAM 400 MILLIGRAM(S): 250 TABLET, FILM COATED ORAL at 04:25

## 2022-10-21 RX ADMIN — MIDAZOLAM HYDROCHLORIDE 2 MILLIGRAM(S): 1 INJECTION, SOLUTION INTRAMUSCULAR; INTRAVENOUS at 00:31

## 2022-10-21 NOTE — H&P ADULT - ASSESSMENT
Patient is 41F with PMH of down syndrome, cataracts, and epilepsy BIBEMS after witnessed 20-30 minute tonic clonic seizure. Admitted for monitoring and management of active seizures.

## 2022-10-21 NOTE — H&P ADULT - NSHPPHYSICALEXAM_GEN_ALL_CORE
LOS:     VITALS:   T(C): 36.8 (10-21-22 @ 00:55), Max: 36.8 (10-20-22 @ 20:50)  HR: 72 (10-21-22 @ 04:25) (60 - 93)  BP: 102/68 (10-21-22 @ 04:25) (99/58 - 114/50)  RR: 18 (10-21-22 @ 04:25) (17 - 22)  SpO2: 100% (10-21-22 @ 04:25) (95% - 100%)    GENERAL: NAD, down syndrome facial characteristics, lying in bed eyes closed with intermittent shaking of legs and arms  HEAD:  Atraumatic, Normocephalic  EYES: Pupils pin point, conjunctiva and sclera clear, no deviation   ENT: Moist mucous membranes, no tongue lacerations  NECK: Supple, No JVD  CHEST/LUNG: Clear to auscultation bilaterally; No rales, rhonchi, wheezing, or rubs. Unlabored respirations  HEART: Regular rate and rhythm; No murmurs, rubs, or gallops  ABDOMEN: BSx4; Soft, signs of tenderness to palpation over suprapubic area, nondistended  EXTREMITIES:  2+ Peripheral Pulses, brisk capillary refill. No clubbing, cyanosis, or edema  NERVOUS SYSTEM:  A&Ox0, 30 seconds of active seizure like activity: tonic clonic movements with flexor arm posturing, arching of back, facial grimace, urinary incontinence   SKIN: No rashes or lesions

## 2022-10-21 NOTE — CHART NOTE - NSCHARTNOTEFT_GEN_A_CORE
EVENT: Called by Primary RN for reports of hypotension, SBP low 80s    HPI: Patient is 41F with PMH of down syndrome, nonverbal at baseline, cataracts, and epilepsy BIBEMS after witnessed 20-30 minute tonic clonic seizure. CTH negative, EEG pending. S/P several episodes of seizures today. s/p Depakene 500mg, Midazolam 2mg x3, Lorazepam 2mg, Haldol 2.5 mg, Keppra 1g, Benadryl 25mg.    OBJECTIVE:  Vital Signs Last 24 Hrs  T(C): 37.4 (21 Oct 2022 20:34), Max: 37.4 (21 Oct 2022 20:34)  T(F): 99.4 (21 Oct 2022 20:34), Max: 99.4 (21 Oct 2022 20:34)  HR: 71 (21 Oct 2022 20:34) (60 - 93)  BP: 82/51 (21 Oct 2022 20:34) (79/37 - 137/95)  BP(mean): 61 (21 Oct 2022 20:34) (47 - 71)  RR: 17 (21 Oct 2022 20:34) (16 - 20)  SpO2: 100% (21 Oct 2022 20:34) (95% - 100%)    Parameters below as of 21 Oct 2022 20:34  Patient On (Oxygen Delivery Method): nasal cannula  O2 Flow (L/min): 2    PHYSICAL EXAM:  Neuro: Nonverbal at baseline-per mother, responds to voice/pain stimuli  Cardiovascular: + S1, S2, no murmurs, rubs, or bruits  Respiratory: clear to auscultation bilaterally with good air entry   GI: Abdomen soft, non-tender, bowel sounds present   : Non distended;   Skin: warm and dry; no rash      LABS:                        13.3   7.76  )-----------( 169      ( 21 Oct 2022 09:29 )             40.4     10-21    142  |  106  |  16  ----------------------------<  79  4.7   |  31  |  0.97    Ca    8.4      21 Oct 2022 09:29  Phos  3.5     10-21  Mg     2.1     10-21    TPro  7.2  /  Alb  2.8<L>  /  TBili  0.3  /  DBili  x   /  AST  53<H>  /  ALT  40  /  AlkPhos  79  10-20        ASSESSMENT/PROBLEM: Acute hypotension most likely due to benzodiazepine use earlier today for breakthrough seizures.     PLAN: Normal Saline 500 ml over 30 minutes x 1           C/W Donezepil 5mg Daily           Switch to Valproic Acid 500 mg IV  BID    FOLLOW UP / RESULT: Trend BP                                   Clinical monitoring for seizures                                   CTH is mental status declines further EVENT: Called by Primary RN for reports of hypotension, SBP low 80s    HPI: Patient is 41F with PMH of down syndrome, nonverbal at baseline, cataracts, and epilepsy BIBEMS after witnessed 20-30 minute tonic clonic seizure. CTH negative, EEG pending. S/P several episodes of seizures today. s/p Depakene 500mg, Midazolam 2mg x3, Lorazepam 2mg, Haldol 2.5 mg, Keppra 1g, Benadryl 25mg.    OBJECTIVE:  Vital Signs Last 24 Hrs  T(C): 37.4 (21 Oct 2022 20:34), Max: 37.4 (21 Oct 2022 20:34)  T(F): 99.4 (21 Oct 2022 20:34), Max: 99.4 (21 Oct 2022 20:34)  HR: 71 (21 Oct 2022 20:34) (60 - 93)  BP: 82/51 (21 Oct 2022 20:34) (79/37 - 137/95)  BP(mean): 61 (21 Oct 2022 20:34) (47 - 71)  RR: 17 (21 Oct 2022 20:34) (16 - 20)  SpO2: 100% (21 Oct 2022 20:34) (95% - 100%)    Parameters below as of 21 Oct 2022 20:34  Patient On (Oxygen Delivery Method): nasal cannula  O2 Flow (L/min): 2    PHYSICAL EXAM:  Neuro: Nonverbal at baseline-per mother, responds to voice/pain stimuli  Cardiovascular: + S1, S2, no murmurs, rubs, or bruits  Respiratory: clear to auscultation bilaterally with good air entry   GI: Abdomen soft, non-tender, bowel sounds present   : Non distended;   Skin: warm and dry; no rash      LABS:                        13.3   7.76  )-----------( 169      ( 21 Oct 2022 09:29 )             40.4     10-21    142  |  106  |  16  ----------------------------<  79  4.7   |  31  |  0.97    Ca    8.4      21 Oct 2022 09:29  Phos  3.5     10-21  Mg     2.1     10-21    TPro  7.2  /  Alb  2.8<L>  /  TBili  0.3  /  DBili  x   /  AST  53<H>  /  ALT  40  /  AlkPhos  79  10-20        ASSESSMENT/PROBLEM: Acute hypotension most likely due to benzodiazepine use earlier today for breakthrough seizures.     PLAN: Normal Saline 500 ml over 30 minutes x 1           C/W Donezepil 5mg Daily           Considered Valproic Acid 500 mg IV BID (Per Neurology recommendations) HOWEVER per pharmacy IV Valproate on back order therefore           Switch to Keppra 500 mg IV BID    FOLLOW UP / RESULT: Trend BP                                   Clinical monitoring for seizures

## 2022-10-21 NOTE — H&P ADULT - PROBLEM SELECTOR PLAN 2
p/w abdominal tenderness on exam and recent history of diarrhea and constipation per mother  -CT abdomen: Abundant fecal material identified within the colonic loops extending to   mildly distended rectum with suggestion of wall thickening. Mild bladder wall thickening.  -c/w home bisacodyl 5mg PRN BID  -bowel regimen: miralax and senna  -Tylenol PRN q6 for mild pain p/w abdominal tenderness on exam and recent history of diarrhea and constipation per mother  -CT abdomen: Abundant fecal material identified within the colonic loops extending to   mildly distended rectum with suggestion of wall thickening. Mild bladder wall thickening.  -c/w home bisacodyl 5mg PRN BID  -bowel regimen: miralax and senna  -Tylenol PRN q6 for mild pain  -UA negative, UCx, Utox  -f/u bladder scan, maddox if retaining

## 2022-10-21 NOTE — EEG REPORT - NS EEG TEXT BOX
BRENDA RUSSO [id number] 41y (1980)F  Admitting MD: Dr. Emmie Herrera    Study Date: 10-21-22    --------------------------------------------------------------------------------------------------  History:  CC/ HPI Patient is a 41y old  Female who presents with a chief complaint of seizure (21 Oct 2022 05:45)    ARIPiprazole 2 milliGRAM(s) Oral daily  donepezil 5 milliGRAM(s) Oral at bedtime  enoxaparin Injectable 40 milliGRAM(s) SubCutaneous every 24 hours  ferrous    sulfate 325 milliGRAM(s) Oral daily  fluticasone propionate 50 MICROgram(s)/spray Nasal Spray 1 Spray(s) Both Nostrils two times a day  polyethylene glycol 3350 17 Gram(s) Oral daily  senna 2 Tablet(s) Oral at bedtime  traZODone 100 milliGRAM(s) Oral at bedtime  valproic  acid Syrup 500 milliGRAM(s) Oral two times a day    --------------------------------------------------------------------------------------------------  Study Interpretation:    [[[Abbreviation Key:  PDR=alpha rhythm/posterior dominant rhythm. A-P=anterior posterior gradient.  Amplitude: ‘very low’:<20; ‘low’:20-50; ‘medium’:; ‘high’:>200uV.  Persistence for periodic/rhythmic patterns (% of epoch) ‘rare’:<1%; ‘occasional’:1-10%; ‘frequent’:10-50%; ‘abundant’:50-90%; ‘continuous’:>90%.  Persistence for sporadic discharges: ‘rare’:<1/hr; ‘occasional’:1/min-1/hr; ‘frequent’:>1/min; ‘abundant’:>1/10 sec.  GRDA=generalized rhythmic delta activity; FIRDA=frontal intermittent GRDA; LRDA=lateralized rhythmic delta activity; TIRDA=temporal intermittent rhythmic delta activity;  LPD=PLED=lateralized periodic discharges; GPD=generalized periodic discharges; BiPDs=BiPLEDs=bilateral independent periodic epileptiform discharges; SIRPID=stimulus induced rhythmic, periodic, or ictal appearing discharges; BIRDs=brief potentially ictal rhythmic discharges >4 Hz, lasting .5-10s; PFA (paroxysmal bursts >13 Hz or =8 Hz).  Modifiers: +F=with fast component; +S=with spike component; +R=with rhythmic component.  S-B=burst suppression pattern.  Max=maximal. N1-drowsy; N2-stage II sleep; N3-slow wave sleep. SSS/BETS=small sharp spikes/benign epileptiform transients of sleep. HV=hyperventilation; PS=photic stimulation]]]    FINDINGS:  The background was continuous, spontaneously variable and reactive.  No clear PDR. AP gradient largely absent.     Background Slowing:  Generalized slowing: Continuous diffuse polymorphic delta and theta  Focal slowing: none was present.    Sleep Background:  -Drowsiness was characterized by fragmentation, attenuation, and slowing of the background activity.    -N2 was characterized by the presence of vertex waves, symmetric spindles, and K-complexes.    Epileptiform Activity:   Frequent bifrontal sharp waves, max FP1/FP2 > F3/F4    Events:  No clinical events were recorded.  No seizures were recorded.    Activation Procedures:   -Hyperventilation was not performed.     -Photic stimulation was performed and did not elicit any abnormalities.      Artifacts:  Intermittent myogenic and external motion artifacts were noted.    ECG:  The heart rate on single channel ECG at baseline was predominantly near BPM =   -----------------------------------------------------------------------------------------------------    EEG Classification / Summary:  Abnormal EEG study, awake / drowsy / asleep      Frequent Bifrontal sharp waves  Background slowing, generalized, moderate  -----------------------------------------------------------------------------------------------------    Clinical Impression:    Evidence for increased risk for seizures from the bilateral frontal regions  Moderate diffuse/multifocal cerebral dysfunction, not specific as to etiology  There were no seizures recorded.      This is a prelim report only, pending review with attending prior to finalization.    -------------------------------------------------------------------------------------------------------  [facility] EEG Reading Room Ph#: [phone]  Epilepsy Answering Service after 5PM and before 8:30AM: Ph#: [phone]    Jaskaran Bourgeois M.D, epilepsy fellow   BRENDA RUSSO [id number] 41y (1980)F  Admitting MD: Dr. Emmie Herrera    Study Date: 10-21-22    --------------------------------------------------------------------------------------------------  History:  CC/ HPI Patient is a 41y old  Female who presents with a chief complaint of seizure (21 Oct 2022 05:45)    ARIPiprazole 2 milliGRAM(s) Oral daily  donepezil 5 milliGRAM(s) Oral at bedtime  enoxaparin Injectable 40 milliGRAM(s) SubCutaneous every 24 hours  ferrous    sulfate 325 milliGRAM(s) Oral daily  fluticasone propionate 50 MICROgram(s)/spray Nasal Spray 1 Spray(s) Both Nostrils two times a day  polyethylene glycol 3350 17 Gram(s) Oral daily  senna 2 Tablet(s) Oral at bedtime  traZODone 100 milliGRAM(s) Oral at bedtime  valproic  acid Syrup 500 milliGRAM(s) Oral two times a day    --------------------------------------------------------------------------------------------------  Study Interpretation:    [[[Abbreviation Key:  PDR=alpha rhythm/posterior dominant rhythm. A-P=anterior posterior gradient.  Amplitude: ‘very low’:<20; ‘low’:20-50; ‘medium’:; ‘high’:>200uV.  Persistence for periodic/rhythmic patterns (% of epoch) ‘rare’:<1%; ‘occasional’:1-10%; ‘frequent’:10-50%; ‘abundant’:50-90%; ‘continuous’:>90%.  Persistence for sporadic discharges: ‘rare’:<1/hr; ‘occasional’:1/min-1/hr; ‘frequent’:>1/min; ‘abundant’:>1/10 sec.  GRDA=generalized rhythmic delta activity; FIRDA=frontal intermittent GRDA; LRDA=lateralized rhythmic delta activity; TIRDA=temporal intermittent rhythmic delta activity;  LPD=PLED=lateralized periodic discharges; GPD=generalized periodic discharges; BiPDs=BiPLEDs=bilateral independent periodic epileptiform discharges; SIRPID=stimulus induced rhythmic, periodic, or ictal appearing discharges; BIRDs=brief potentially ictal rhythmic discharges >4 Hz, lasting .5-10s; PFA (paroxysmal bursts >13 Hz or =8 Hz).  Modifiers: +F=with fast component; +S=with spike component; +R=with rhythmic component.  S-B=burst suppression pattern.  Max=maximal. N1-drowsy; N2-stage II sleep; N3-slow wave sleep. SSS/BETS=small sharp spikes/benign epileptiform transients of sleep. HV=hyperventilation; PS=photic stimulation]]]    FINDINGS:  The background was continuous, spontaneously variable and reactive.  No clear PDR. AP gradient largely absent.     Background Slowing:  Generalized slowing: Continuous diffuse polymorphic delta and theta  Focal slowing: none was present.    Sleep Background:  -Drowsiness was characterized by fragmentation, attenuation, and slowing of the background activity.    -N2 was characterized by the presence of vertex waves, symmetric spindles, and K-complexes.    Epileptiform Activity:   Frequent bifrontal sharp waves, max FP1/FP2 > F3/F4, often asymmetrical favoring left > right.     Events:  No clinical events were recorded.  No seizures were recorded.    Activation Procedures:   -Hyperventilation was not performed.     -Photic stimulation was performed and did not elicit any abnormalities.      Artifacts:  Intermittent myogenic and external motion artifacts were noted.    ECG:  The heart rate on single channel ECG at baseline was predominantly near BPM =   -----------------------------------------------------------------------------------------------------    EEG Classification / Summary:  Abnormal EEG study, awake / drowsy / asleep      sharp waves, focal, L>R frontal  Background slowing, generalized, moderate  -----------------------------------------------------------------------------------------------------    Clinical Impression:    Evidence for increased risk for seizures from the L>R frontal regions  Moderate diffuse/multifocal cerebral dysfunction, not specific as to etiology  There were no seizures recorded.      -------------------------------------------------------------------------------------------------------  [facility] EEG Reading Room Ph#: [phone]  Epilepsy Answering Service after 5PM and before 8:30AM: Ph#: [phone]    Jaskaran Bourgeois M.D, epilepsy fellow    Robert Cote MD PhD  Director, Epilepsy Division, [facility]

## 2022-10-21 NOTE — CHART NOTE - NSCHARTNOTEFT_GEN_A_CORE
EVENT: EDHL NP      OBJECTIVE:  Vital Signs Last 24 Hrs  T(C): 36.7 (21 Oct 2022 13:48), Max: 36.8 (20 Oct 2022 20:50)  T(F): 98 (21 Oct 2022 13:48), Max: 98.3 (20 Oct 2022 20:50)  HR: 81 (21 Oct 2022 13:48) (60 - 93)  BP: 137/95 (21 Oct 2022 13:48) (93/71 - 137/95)  BP(mean): 71 (21 Oct 2022 02:35) (71 - 71)  RR: 18 (21 Oct 2022 13:48) (16 - 22)  SpO2: 98% (21 Oct 2022 13:48) (95% - 100%)    Parameters below as of 21 Oct 2022 13:48  Patient On (Oxygen Delivery Method): room air        LABS:                        13.3   7.76  )-----------( 169      ( 21 Oct 2022 09:29 )             40.4     10-21    142  |  106  |  16  ----------------------------<  79  4.7   |  31  |  0.97    Ca    8.4      21 Oct 2022 09:29  Phos  3.5     10-21  Mg     2.1     10-21    TPro  7.2  /  Alb  2.8<L>  /  TBili  0.3  /  DBili  x   /  AST  53<H>  /  ALT  40  /  AlkPhos  79  10-20      EKG:   IMGAGING:    ASSESSMENT:  HPI:  Patient is 41F with PMH of down syndrome, cataracts, and epilepsy BIBEMS after witnessed 20-30 minute tonic clonic seizure. Per patients mother at bedside patient was lying on the couch at home when she began "showing signs of discomfort" and was "not steady with movements". Describes the patient shaking all over with elbows flexed, moving torso side to side, and neck extended and in lateral flexion. Mother says that she placed a stick in the patients mouth to keep her from biting her tongue. Denies urinary or fecal incontinence or any trauma sustained during seizure. Says the patients last seizure was in early October with similar presentation that also included eyes rolling upward and tongue biting. Reports that patient's first seizure was around the time of the start of the COVID-19 pandemic and that since her first seizure patient no longer speaks. Says seizures usually last 20 minutes and postictal period is 30 minutes to an hour, however most recent seizures and post ictal period have lasted longer. States that patient is still not at baseline during time of interview and exam with intermittent shaking of limbs and signs of distress. Confirms compliance with current medications. Reports that patient had diarrhea recently followed by constipation. Denies sick contacts and any other signs or symptoms of illness.     In ED:  VS 98.3 HR 68 /84  s/p Depakene 500mg, Midazolam 2mg x3, Lorazepam 2mg, Haldol 2.5 mg, Keppra 1g, Benadryl 25mg    During exam patient started having seizure like episode confirmed by mother lasting ~30 seconds with posturing and convulsions as described above along with facial grimacing and urinary incontinence and given additional dose of Lorazepam 1mg IV.            (21 Oct 2022 05:45)      PLAN: Patient seen and examined at bedside.  f/u final EEG  Lorazepam 2mg IVP given x1 for seizure  enema given x1  Bladder scan 107  UA repeat negative  Neurology Dr. Cardenas

## 2022-10-21 NOTE — PROGRESS NOTE ADULT - TIME BILLING
I counseled the primary team and the patient's mother at bedside about the patient's neurodiagnostic test results, as well as the appropriate medications to take for seizure prophylaxis.

## 2022-10-21 NOTE — H&P ADULT - ATTENDING COMMENTS
Vital Signs Last 24 Hrs  T(C): 36.8 (21 Oct 2022 00:55), Max: 36.8 (20 Oct 2022 20:50)  T(F): 98.3 (21 Oct 2022 00:55), Max: 98.3 (20 Oct 2022 20:50)  HR: 69 (21 Oct 2022 07:00) (60 - 93)  BP: 93/71 (21 Oct 2022 07:00) (93/71 - 114/50)  BP(mean): 71 (21 Oct 2022 02:35) (71 - 71)  RR: 18 (21 Oct 2022 07:00) (17 - 22)  SpO2: 95% (21 Oct 2022 07:00) (95% - 100%)    Parameters below as of 21 Oct 2022 07:00  Patient On (Oxygen Delivery Method): room air    Labs and imaging studies noted. Patient is a 40 yo F with PMH of down syndrome, cataracts, and epilepsy on Valproic acid, BIBEMS after witnessed 20-30 minute tonic clonic seizure. Per patients mother at bedside patient was lying on the couch at home when she began "showing signs of discomfort" and was "not steady with movements". Describes the patient shaking all over with elbows flexed, moving torso side to side, and neck extended and in lateral flexion. Mother says that she placed a stick in the patients mouth to keep her from biting her tongue. Denies urinary or fecal incontinence or any trauma sustained during seizure. Says the patients last seizure was in early October with similar presentation that also included eyes rolling upward and tongue biting. Reports that patient's first seizure was around the time of the start of the COVID-19 pandemic and that since her first seizure patient no longer speaks. Says seizures usually last 20 minutes and postictal period is 30 minutes to an hour, however most recent seizures and post ictal period have lasted longer. States that patient is still not at baseline during time of interview and exam with intermittent shaking of limbs and signs of distress. Confirms compliance with current medications. Reports that patient had diarrhea recently followed by constipation. Denies sick contacts and any other signs or symptoms of illness.     Vital Signs Last 24 Hrs  T(C): 36.8 (21 Oct 2022 00:55), Max: 36.8 (20 Oct 2022 20:50)  T(F): 98.3 (21 Oct 2022 00:55), Max: 98.3 (20 Oct 2022 20:50)  HR: 69 (21 Oct 2022 07:00) (60 - 93)  BP: 93/71 (21 Oct 2022 07:00) (93/71 - 114/50)  BP(mean): 71 (21 Oct 2022 02:35) (71 - 71)  RR: 18 (21 Oct 2022 07:00) (17 - 22)  SpO2: 95% (21 Oct 2022 07:00) (95% - 100%)  Patient On (Oxygen Delivery Method): room air    Labs and imaging studies noted.    s/p Depakene 500mg, Midazolam 2mg x3, Lorazepam 2mg, Haldol 2.5 mg, Keppra 1g, Benadryl 25mg  another witnessed episode of seizure episode in ED.    Assessment and plan: 40 yo F with PMH of down syndrome, cataracts, and epilepsy on Valproic acid, BIBEMS after witnessed 20-30 minute tonic clonic seizure, admitted for breakthrough seizure.     Epilepsy- with breakthrough seizure on Valproic acid  Down syndrome  Abdominal pain/constipation    - CT head involutional changes, no acute finding  - Neuro consulted Dr. Cardenas in ED.  - recommended increased [id number] mg to 500 mg bid  - UA negative, CT  showing : Mild mosaic ground glass attenuation in bilateral lower lobes, difficult to assess secondary to respiratory motion artifact. no resp symptoms.  - bedside swallow eval, if failed start on iv valproate  - follow EEG  - abd tenderness on exam, CT abd showing Abundant fecal material, likely sterocolitis  - started on Bowel regimen, if no response, consider enema in am.  - bladder scan.  - s/c Lovenox 40 mg dvt px

## 2022-10-21 NOTE — H&P ADULT - PROBLEM SELECTOR PLAN 1
h/o epilepsy presenting with continued brief seizure like activity after witnessed 30 min tonic clonic seizure at home   -s/p Depakene 500mg, Midazolam 2mg x3, Lorazepam 1mg and 2mg, Haldol 2.5 mg, Keppra 1g, Benadryl 25mg  -CT head shows No intracranial hemorrhage or mass effect. Involutional changes beyond expected for patient's age  -continue home abilify 2mg, trazadone 50mg, clonazepam 0.5mg, hydrozyzine 25mg PRN  -Serum valproic acid level 80  -Neurology Consulted: Dr. Cardenas recs appreciated  -f/u EEG (approved)  -c/w home Donepezil 5mg PO qd to help slow down cognitive decline  -increase home valproic acid from 250mg to 500mg BID  -PT/OT as tolerated  -Complete infectious and metabolic workup for potential triggers for breakthrough seizure  -f/u UA, UCx, Utox  -f/u daily CBC, BMP, Mag, Phos  -PT/OT as tolerated  -seizure, aspiration, and fall precautions h/o epilepsy presenting with continued brief seizure like activity after witnessed 30 min tonic clonic seizure at home   -s/p Depakene 500mg, Midazolam 2mg x3, Lorazepam 1mg and 2mg, Haldol 2.5 mg, Keppra 1g, Benadryl 25mg  -CT head shows no intracranial hemorrhage or mass effect. Involutional changes beyond expected for patient's age  -continue home abilify 2mg, trazadone 50mg, clonazepam 0.5mg, hydrozyzine 25mg PRN  -Serum valproic acid level 80  -Neurology Consulted: Dr. Cardenas recs appreciated  -f/u EEG (approved)  -c/w home Donepezil 5mg PO qd to help slow down cognitive decline  -increase home valproic acid from 250mg to 500mg BID (Depakene syrup PO)  -switch valproic acid to IV if unable to tolerate PO   -speech and swallow eval   -PT/OT as tolerated  -Complete infectious and metabolic workup for potential triggers for breakthrough seizure  -f/u chest xray (CT shows mild mosaic groundglass attenuation in bilateral lower lobes and LLL nodule)  -UA negative, UCx, Utox  -f/u daily CBC, BMP, Mag, Phos  -PT/OT as tolerated  -seizure, aspiration, and fall precautions

## 2022-10-21 NOTE — PROGRESS NOTE ADULT - SUBJECTIVE AND OBJECTIVE BOX
NEUROLOGY FOLLOW-UP NOTE    NAME:  BRENDA RUSSO      ASSESSMENT:  41F with epilepsy and myoclonic movements vs. breakthrough seizures in the setting of Down syndrome and early-onset dementia      RECOMMENDATIONS:    - Routine EEG shows seizure tendency in both frontal regions, but did not show any active seizure despite the patient having and episode of bilateral arm shaking during the EEG study    - Valproate dosage can be increased from 500mg to 750mg IV BID - This medication can cover for epileptic seizures and myoclonic movements       - Repeat Valproic acid level can be checked on the morning of 10/23/22 before the morning dose to guide further dosing       - When patient is tolerating oral medications, this can be changed to Valproic acid (Depakene) syrup 750mg PO BID    - There is no clear role for addition Levetiracetam 500mg IV BID - This was not started when the patient presented with new-onset seizures earlier this month       - Levetiracetam can be discontinued once the Valproate dosage has been increased    - CT Head showed no acute intracranial abnormalities - No additional neuroimaging is needed at this time    - Continue home Donepezil 5mg PO Daily to help slow down cognitive decline and Aripiprazole 2mg PO Daily to help manage behavioral disturbances    - Complete infectious and metabolic workup for potential triggers for breakthrough seizure, and treat as appropriate    - PT/OT as tolerated    - DVT ppx: SCDs, Enoxaparin            NOTE TO BE COMPLETED - PLEASE REFER TO ABOVE ONLY AND IGNORE INFORMATION BELOW    ******************************    HPI:  Patient is 41F with PMH of down syndrome, cataracts, and epilepsy BIBEMS after witnessed 20-30 minute tonic clonic seizure. Per patients mother at bedside patient was lying on the couch at home when she began "showing signs of discomfort" and was "not steady with movements". Describes the patient shaking all over with elbows flexed, moving torso side to side, and neck extended and in lateral flexion. Mother says that she placed a stick in the patients mouth to keep her from biting her tongue. Denies urinary or fecal incontinence or any trauma sustained during seizure. Says the patients last seizure was in early October with similar presentation that also included eyes rolling upward and tongue biting. Reports that patient's first seizure was around the time of the start of the COVID-19 pandemic and that since her first seizure patient no longer speaks. Says seizures usually last 20 minutes and postictal period is 30 minutes to an hour, however most recent seizures and post ictal period have lasted longer. States that patient is still not at baseline during time of interview and exam with intermittent shaking of limbs and signs of distress. Confirms compliance with current medications. Reports that patient had diarrhea recently followed by constipation. Denies sick contacts and any other signs or symptoms of illness.     In ED:  VS 98.3 HR 68 /84  s/p Depakene 500mg, Midazolam 2mg x3, Lorazepam 2mg, Haldol 2.5 mg, Keppra 1g, Benadryl 25mg    During exam patient started having seizure like episode confirmed by mother lasting ~30 seconds with posturing and convulsions as described above along with facial grimacing and urinary incontinence and given additional dose of Lorazepam 1mg IV.            (21 Oct 2022 05:45)      NEURO HPI:      INTERVAL HISTORY:      MEDICATIONS:  acetaminophen     Tablet .. 650 milliGRAM(s) Oral every 6 hours PRN  ARIPiprazole 2 milliGRAM(s) Oral daily  bisacodyl 5 milliGRAM(s) Oral every 12 hours PRN  donepezil 5 milliGRAM(s) Oral at bedtime  enoxaparin Injectable 40 milliGRAM(s) SubCutaneous every 24 hours  ferrous    sulfate 325 milliGRAM(s) Oral daily  fluticasone propionate 50 MICROgram(s)/spray Nasal Spray 1 Spray(s) Both Nostrils two times a day  hydrOXYzine hydrochloride 25 milliGRAM(s) Oral three times a day PRN  levETIRAcetam  IVPB 500 milliGRAM(s) IV Intermittent every 12 hours  polyethylene glycol 3350 17 Gram(s) Oral daily  senna 2 Tablet(s) Oral at bedtime  traZODone 100 milliGRAM(s) Oral at bedtime  valproate sodium  IVPB 500 milliGRAM(s) IV Intermittent two times a day      ALLERGIES:  No Known Allergies      REVIEW OF SYSTEMS:  Fourteen systems reviewed and negative except as in HPI / Interval History.        OBJECTIVE:  Vital Signs Last 24 Hrs  T(C): 37.4 (21 Oct 2022 20:34), Max: 37.4 (21 Oct 2022 20:34)  T(F): 99.4 (21 Oct 2022 20:34), Max: 99.4 (21 Oct 2022 20:34)  HR: 71 (21 Oct 2022 23:52) (60 - 93)  BP: 82/47 (21 Oct 2022 23:52) (79/37 - 137/95)  BP(mean): 59 (21 Oct 2022 23:52) (47 - 71)  RR: 17 (21 Oct 2022 20:34) (16 - 20)  SpO2: 100% (21 Oct 2022 20:34) (95% - 100%)    Parameters below as of 21 Oct 2022 20:34  Patient On (Oxygen Delivery Method): nasal cannula  O2 Flow (L/min): 2      General Examination:  General: No acute distress  HEENT: Atraumatic, Normocephalic  Respiratory: CTA B/l.  No crackles, rhonchi, or wheezes.  Cardiovascular: RRR.  Normal S1 & S2.  Normal b/l radial and pedal pulses.    Neurological Examination:  General / Mental Status: AAO x 3.  No aphasia or dysarthria.  Naming and repetition intact.  Cranial Nerves: VFF x 4.  PERRL.  EOMI x 2, No nystagmus or diplopia.  B/l V1-V3 equal and intact to light touch and pinprick.  Symmetric facial movement and palate elevation.  B/l hearing equal to finger rub.  5/5 strength with b/l sternocleidomastoid & trapezius.  Midline tongue protrusion, with no atrophy or fasciculations.  Motor: Normal bulk & tone in all four extremities.  5/5 strength throughout all four extremities.  No downward drift, rigidity, spasticity, or tremors in any of the four extremities.  Sensory: Intact to light touch and pinprick in all four extremities.  Negative Romberg.  Reflex: 2+ and symmetric at b/l biceps, triceps, brachioradialis, patellae, and ankles.  Downgoing toes b/l.  Coordination: No dysmetria with b/l finger-to-nose and heel raise tests.  Symmetric rapid alternating movements b/l.  Gait: Normal, narrow-based gait.  No difficulty with tiptoe, heel, and tandem gaits.        LABORATORY VALUES:                          13.3   7.76  )-----------( 169      ( 21 Oct 2022 09:29 )             40.4       10-21    142  |  106  |  16  ----------------------------<  79  4.7   |  31  |  0.97    Ca    8.4      21 Oct 2022 09:29  Phos  3.5     10-21  Mg     2.1     10-21    TPro  7.2  /  Alb  2.8<L>  /  TBili  0.3  /  DBili  x   /  AST  53<H>  /  ALT  40  /  AlkPhos  79  10-20      LIVER FUNCTIONS - ( 20 Oct 2022 19:45 )  Alb: 2.8 g/dL / Pro: 7.2 g/dL / ALK PHOS: 79 U/L / ALT: 40 U/L DA / AST: 53 U/L / GGT: x                 Glucose Trend  10-21-22 @ 09:29   -  -- 79 --  10-20-22 @ 19:45   -  -- 89 --  10-20-22 @ 18:29   -  -- -- 91                    NEUROIMAGING:          Please contact the Neurology consult service with any neurological questions.      Sourav Cardenas MD   of Neurology  [facility] at [facility]         NEUROLOGY FOLLOW-UP NOTE    NAME:  BRENDA RUSSO      ASSESSMENT:  41F with epilepsy and myoclonic movements vs. breakthrough seizures in the setting of Down syndrome and early-onset dementia      RECOMMENDATIONS:    - Routine EEG shows seizure tendency in both frontal regions, but did not show any active seizure despite the patient having and episode of bilateral arm shaking during the EEG study    - Valproate dosage can be increased from 500mg to 750mg IV BID - This medication can cover for epileptic seizures and myoclonic movements       - Repeat Valproic acid level can be checked on the morning of 10/23/22 before the morning dose to guide further dosing       - When patient is tolerating oral medications, this can be changed to Valproic acid (Depakene) syrup 750mg PO BID    - There is no clear role for addition Levetiracetam 500mg IV BID - This was not started when the patient presented with new-onset seizures earlier this month       - Levetiracetam can be discontinued once the Valproate dosage has been increased    - CT Head showed no acute intracranial abnormalities - No additional neuroimaging is needed at this time    - Continue home Donepezil 5mg PO Daily to help slow down cognitive decline and Aripiprazole 2mg PO Daily to help manage behavioral disturbances    - Complete infectious and metabolic workup for potential triggers for breakthrough seizure, and treat as appropriate    - PT/OT as tolerated    - DVT ppx: SCDs, Enoxaparin            NOTE TO BE COMPLETED - PLEASE REFER TO ABOVE ONLY AND IGNORE INFORMATION BELOW    ******************************    HPI:  Patient is 41F with PMH of down syndrome, cataracts, and epilepsy BIBEMS after witnessed 20-30 minute tonic clonic seizure. Per patients mother at bedside patient was lying on the couch at home when she began "showing signs of discomfort" and was "not steady with movements". Describes the patient shaking all over with elbows flexed, moving torso side to side, and neck extended and in lateral flexion. Mother says that she placed a stick in the patients mouth to keep her from biting her tongue. Denies urinary or fecal incontinence or any trauma sustained during seizure. Says the patients last seizure was in early October with similar presentation that also included eyes rolling upward and tongue biting. Reports that patient's first seizure was around the time of the start of the COVID-19 pandemic and that since her first seizure patient no longer speaks. Says seizures usually last 20 minutes and postictal period is 30 minutes to an hour, however most recent seizures and post ictal period have lasted longer. States that patient is still not at baseline during time of interview and exam with intermittent shaking of limbs and signs of distress. Confirms compliance with current medications. Reports that patient had diarrhea recently followed by constipation. Denies sick contacts and any other signs or symptoms of illness.   In ED: VS 98.3 HR 68 /84 s/p Depakene 500mg, Midazolam 2mg x3, Lorazepam 2mg, Haldol 2.5 mg, Keppra 1g, Benadryl 25mg  During exam patient started having seizure like episode confirmed by mother lasting ~30 seconds with posturing and convulsions as described above along with facial grimacing and urinary incontinence and given additional dose of Lorazepam 1mg IV.            (21 Oct 2022 05:45)      NEURO HPI:  History is provided by the patient's mother at bedside. This is a 41-year-old woman with Down syndrome and early-onset dementia, on Donepezil, who presented to the ED on 10/8/22 with generalized shaking, and was diagnosed with seizure and started on Valproic acid 500mg PO BID. She presented today with another witnessed episode of generalized shaking.      INTERVAL HISTORY:  The patient had a witnessed episode of generalized shaking with urinary incontinence today, for which she received Lorazepam 2mg IV x 1.      MEDICATIONS:  acetaminophen     Tablet .. 650 milliGRAM(s) Oral every 6 hours PRN  ARIPiprazole 2 milliGRAM(s) Oral daily  bisacodyl 5 milliGRAM(s) Oral every 12 hours PRN  donepezil 5 milliGRAM(s) Oral at bedtime  enoxaparin Injectable 40 milliGRAM(s) SubCutaneous every 24 hours  ferrous    sulfate 325 milliGRAM(s) Oral daily  fluticasone propionate 50 MICROgram(s)/spray Nasal Spray 1 Spray(s) Both Nostrils two times a day  hydrOXYzine hydrochloride 25 milliGRAM(s) Oral three times a day PRN  levETIRAcetam  IVPB 500 milliGRAM(s) IV Intermittent every 12 hours  polyethylene glycol 3350 17 Gram(s) Oral daily  senna 2 Tablet(s) Oral at bedtime  traZODone 100 milliGRAM(s) Oral at bedtime  valproate sodium  IVPB 500 milliGRAM(s) IV Intermittent two times a day      ALLERGIES:  No Known Allergies      REVIEW OF SYSTEMS:  Fourteen systems reviewed and negative except as in HPI / Interval History.        OBJECTIVE:  Vital Signs Last 24 Hrs  T(C): 37.4 (21 Oct 2022 20:34), Max: 37.4 (21 Oct 2022 20:34)  T(F): 99.4 (21 Oct 2022 20:34), Max: 99.4 (21 Oct 2022 20:34)  HR: 71 (21 Oct 2022 23:52) (60 - 93)  BP: 82/47 (21 Oct 2022 23:52) (79/37 - 137/95)  BP(mean): 59 (21 Oct 2022 23:52) (47 - 71)  RR: 17 (21 Oct 2022 20:34) (16 - 20)  SpO2: 100% (21 Oct 2022 20:34) (95% - 100%)    Parameters below as of 21 Oct 2022 20:34  Patient On (Oxygen Delivery Method): nasal cannula  O2 Flow (L/min): 2      General Examination:  General: No acute distress  HEENT: Atraumatic, Normocephalic  Respiratory: CTA B/l.  No crackles, rhonchi, or wheezes.  Cardiovascular: RRR.  Normal S1 & S2.  Normal b/l radial and pedal pulses.    Neurological Examination:  General / Mental Status: AAO x 3.  No aphasia or dysarthria.  Naming and repetition intact.  Cranial Nerves: VFF x 4.  PERRL.  EOMI x 2, No nystagmus or diplopia.  B/l V1-V3 equal and intact to light touch and pinprick.  Symmetric facial movement and palate elevation.  B/l hearing equal to finger rub.  5/5 strength with b/l sternocleidomastoid & trapezius.  Midline tongue protrusion, with no atrophy or fasciculations.  Motor: Normal bulk & tone in all four extremities.  5/5 strength throughout all four extremities.  No downward drift, rigidity, spasticity, or tremors in any of the four extremities.  Sensory: Intact to light touch and pinprick in all four extremities.  Negative Romberg.  Reflex: 2+ and symmetric at b/l biceps, triceps, brachioradialis, patellae, and ankles.  Downgoing toes b/l.  Coordination: No dysmetria with b/l finger-to-nose and heel raise tests.  Symmetric rapid alternating movements b/l.  Gait: Normal, narrow-based gait.  No difficulty with tiptoe, heel, and tandem gaits.        LABORATORY VALUES:                          13.3   7.76  )-----------( 169      ( 21 Oct 2022 09:29 )             40.4       10-21    142  |  106  |  16  ----------------------------<  79  4.7   |  31  |  0.97    Ca    8.4      21 Oct 2022 09:29  Phos  3.5     10-21  Mg     2.1     10-21    TPro  7.2  /  Alb  2.8<L>  /  TBili  0.3  /  DBili  x   /  AST  53<H>  /  ALT  40  /  AlkPhos  79  10-20      LIVER FUNCTIONS - ( 20 Oct 2022 19:45 )  Alb: 2.8 g/dL / Pro: 7.2 g/dL / ALK PHOS: 79 U/L / ALT: 40 U/L DA / AST: 53 U/L / GGT: x                 Glucose Trend  10-21-22 @ 09:29   -  -- 79 --  10-20-22 @ 19:45   -  -- 89 --  10-20-22 @ 18:29   -  -- -- 91                    NEUROIMAGING:          Please contact the Neurology consult service with any neurological questions.      Sourav Cardenas MD   of Neurology  [facility]         NEUROLOGY FOLLOW-UP NOTE    NAME:  BRENDA       ASSESSMENT:  41F with epilepsy and myoclonic movements vs. breakthrough seizures in the setting of Down syndrome and early-onset dementia      RECOMMENDATIONS:    - Routine EEG shows seizure tendency in both frontal regions, but did not show any active seizure despite the patient having and episode of bilateral arm shaking during the EEG study    - Since there is shortage of IV Valproate and patient cannot tolerated PO medications at this time, patient may be maintained on Levetiracetam IV to control epileptic seizures and myoclonic movements:       - Levetiracetam dosage can be increased from 500mg to 750mg IV BID for improved control over involuntary movements    - CT Head showed no acute intracranial abnormalities - No additional neuroimaging is needed at this time    - Continue home Donepezil 5mg PO Daily to help slow down cognitive decline and Aripiprazole 2mg PO Daily to help manage behavioral disturbances    - Complete infectious and metabolic workup for potential triggers for breakthrough seizure, and treat as appropriate    - PT/OT as tolerated    - DVT ppx: SCDs, Enoxaparin            NOTE TO BE COMPLETED - PLEASE REFER TO ABOVE ONLY AND IGNORE INFORMATION BELOW    ******************************    HPI:  Patient is 41F with PMH of down syndrome, cataracts, and epilepsy BIBEMS after witnessed 20-30 minute tonic clonic seizure. Per patients mother at bedside patient was lying on the couch at home when she began "showing signs of discomfort" and was "not steady with movements". Describes the patient shaking all over with elbows flexed, moving torso side to side, and neck extended and in lateral flexion. Mother says that she placed a stick in the patients mouth to keep her from biting her tongue. Denies urinary or fecal incontinence or any trauma sustained during seizure. Says the patients last seizure was in early October with similar presentation that also included eyes rolling upward and tongue biting. Reports that patient's first seizure was around the time of the start of the COVID-19 pandemic and that since her first seizure patient no longer speaks. Says seizures usually last 20 minutes and postictal period is 30 minutes to an hour, however most recent seizures and post ictal period have lasted longer. States that patient is still not at baseline during time of interview and exam with intermittent shaking of limbs and signs of distress. Confirms compliance with current medications. Reports that patient had diarrhea recently followed by constipation. Denies sick contacts and any other signs or symptoms of illness.   In ED: VS 98.3 HR 68 /84 s/p Depakene 500mg, Midazolam 2mg x3, Lorazepam 2mg, Haldol 2.5 mg, Keppra 1g, Benadryl 25mg  During exam patient started having seizure like episode confirmed by mother lasting ~30 seconds with posturing and convulsions as described above along with facial grimacing and urinary incontinence and given additional dose of Lorazepam 1mg IV.            (21 Oct 2022 05:45)      NEURO HPI:  History is provided by the patient's mother at bedside. This is a 41-year-old woman with Down syndrome and early-onset dementia, on Donepezil, who presented to the ED on 10/8/22 with generalized shaking, and was diagnosed with seizure and started on Valproic acid 500mg PO BID. She presented today with another witnessed episode of generalized shaking.      INTERVAL HISTORY:  The patient had a witnessed episode of generalized shaking with urinary incontinence today, for which she received Lorazepam 2mg IV x 1.      MEDICATIONS:  acetaminophen     Tablet .. 650 milliGRAM(s) Oral every 6 hours PRN  ARIPiprazole 2 milliGRAM(s) Oral daily  bisacodyl 5 milliGRAM(s) Oral every 12 hours PRN  donepezil 5 milliGRAM(s) Oral at bedtime  enoxaparin Injectable 40 milliGRAM(s) SubCutaneous every 24 hours  ferrous    sulfate 325 milliGRAM(s) Oral daily  fluticasone propionate 50 MICROgram(s)/spray Nasal Spray 1 Spray(s) Both Nostrils two times a day  hydrOXYzine hydrochloride 25 milliGRAM(s) Oral three times a day PRN  levETIRAcetam  IVPB 500 milliGRAM(s) IV Intermittent every 12 hours  polyethylene glycol 3350 17 Gram(s) Oral daily  senna 2 Tablet(s) Oral at bedtime  traZODone 100 milliGRAM(s) Oral at bedtime  valproate sodium  IVPB 500 milliGRAM(s) IV Intermittent two times a day      ALLERGIES:  No Known Allergies      REVIEW OF SYSTEMS:  Fourteen systems reviewed and negative except as in HPI / Interval History.        OBJECTIVE:  Vital Signs Last 24 Hrs  T(C): 37.4 (21 Oct 2022 20:34), Max: 37.4 (21 Oct 2022 20:34)  T(F): 99.4 (21 Oct 2022 20:34), Max: 99.4 (21 Oct 2022 20:34)  HR: 71 (21 Oct 2022 23:52) (60 - 93)  BP: 82/47 (21 Oct 2022 23:52) (79/37 - 137/95)  BP(mean): 59 (21 Oct 2022 23:52) (47 - 71)  RR: 17 (21 Oct 2022 20:34) (16 - 20)  SpO2: 100% (21 Oct 2022 20:34) (95% - 100%)    Parameters below as of 21 Oct 2022 20:34  Patient On (Oxygen Delivery Method): nasal cannula  O2 Flow (L/min): 2      General Examination:  General: No acute distress  HEENT: Atraumatic, Normocephalic  Respiratory: CTA B/l.  No crackles, rhonchi, or wheezes.  Cardiovascular: RRR.  Normal S1 & S2.  Normal b/l radial and pedal pulses.    Neurological Examination:  General / Mental Status: AAO x 3.  No aphasia or dysarthria.  Naming and repetition intact.  Cranial Nerves: VFF x 4.  PERRL.  EOMI x 2, No nystagmus or diplopia.  B/l V1-V3 equal and intact to light touch and pinprick.  Symmetric facial movement and palate elevation.  B/l hearing equal to finger rub.  5/5 strength with b/l sternocleidomastoid & trapezius.  Midline tongue protrusion, with no atrophy or fasciculations.  Motor: Normal bulk & tone in all four extremities.  5/5 strength throughout all four extremities.  No downward drift, rigidity, spasticity, or tremors in any of the four extremities.  Sensory: Intact to light touch and pinprick in all four extremities.  Negative Romberg.  Reflex: 2+ and symmetric at b/l biceps, triceps, brachioradialis, patellae, and ankles.  Downgoing toes b/l.  Coordination: No dysmetria with b/l finger-to-nose and heel raise tests.  Symmetric rapid alternating movements b/l.  Gait: Normal, narrow-based gait.  No difficulty with tiptoe, heel, and tandem gaits.        LABORATORY VALUES:                          13.3   7.76  )-----------( 169      ( 21 Oct 2022 09:29 )             40.4       10-21    142  |  106  |  16  ----------------------------<  79  4.7   |  31  |  0.97    Ca    8.4      21 Oct 2022 09:29  Phos  3.5     10-21  Mg     2.1     10-21    TPro  7.2  /  Alb  2.8<L>  /  TBili  0.3  /  DBili  x   /  AST  53<H>  /  ALT  40  /  AlkPhos  79  10-20      LIVER FUNCTIONS - ( 20 Oct 2022 19:45 )  Alb: 2.8 g/dL / Pro: 7.2 g/dL / ALK PHOS: 79 U/L / ALT: 40 U/L DA / AST: 53 U/L / GGT: x                 Glucose Trend  10-21-22 @ 09:29   -  -- 79 --  10-20-22 @ 19:45   -  -- 89 --  10-20-22 @ 18:29   -  -- -- 91                    NEUROIMAGING:          Please contact the Neurology consult service with any neurological questions.      Sourav Cardenas MD   of Neurology  [facility] at \[facility]\""/[facility]         NEUROLOGY FOLLOW-UP NOTE    NAME:  BRENDA RUSSO      ASSESSMENT:  41F with epilepsy and myoclonic movements vs. breakthrough seizures in the setting of Down syndrome and early-onset dementia      RECOMMENDATIONS:    - Routine EEG shows seizure tendency in both frontal regions, but did not show any active seizure despite the patient having and episode of bilateral arm shaking during the EEG study    - Since there is shortage of IV Valproate and patient cannot tolerated PO medications at this time, patient may be maintained on Levetiracetam IV to control epileptic seizures and myoclonic movements:       - Levetiracetam dosage can be increased from 500mg to 750mg IV BID for improved control over involuntary movements    - CT Head showed no acute intracranial abnormalities - No additional neuroimaging is needed at this time    - Continue home Donepezil 5mg PO Daily to help slow down cognitive decline and Aripiprazole 2mg PO Daily to help manage behavioral disturbances    - Complete infectious and metabolic workup for potential triggers for breakthrough seizure, and treat as appropriate    - PT/OT as tolerated    - DVT ppx: SCDs, Enoxaparin            ******************************    HPI:  Patient is 41F with PMH of down syndrome, cataracts, and epilepsy BIBEMS after witnessed 20-30 minute tonic clonic seizure. Per patients mother at bedside patient was lying on the couch at home when she began "showing signs of discomfort" and was "not steady with movements". Describes the patient shaking all over with elbows flexed, moving torso side to side, and neck extended and in lateral flexion. Mother says that she placed a stick in the patients mouth to keep her from biting her tongue. Denies urinary or fecal incontinence or any trauma sustained during seizure. Says the patients last seizure was in early October with similar presentation that also included eyes rolling upward and tongue biting. Reports that patient's first seizure was around the time of the start of the COVID-19 pandemic and that since her first seizure patient no longer speaks. Says seizures usually last 20 minutes and postictal period is 30 minutes to an hour, however most recent seizures and post ictal period have lasted longer. States that patient is still not at baseline during time of interview and exam with intermittent shaking of limbs and signs of distress. Confirms compliance with current medications. Reports that patient had diarrhea recently followed by constipation. Denies sick contacts and any other signs or symptoms of illness.   In ED: VS 98.3 HR 68 /84 s/p Depakene 500mg, Midazolam 2mg x3, Lorazepam 2mg, Haldol 2.5 mg, Keppra 1g, Benadryl 25mg  During exam patient started having seizure like episode confirmed by mother lasting ~30 seconds with posturing and convulsions as described above along with facial grimacing and urinary incontinence and given additional dose of Lorazepam 1mg IV.            (21 Oct 2022 05:45)      NEURO HPI:  History is provided by the patient's mother at bedside. This is a 41-year-old woman with Down syndrome and early-onset dementia, on Donepezil, who presented to the ED on 10/8/22 with generalized shaking, and was diagnosed with seizure and started on Valproic acid 500mg PO BID. She presented today with another witnessed episode of generalized shaking.      INTERVAL HISTORY:  The patient had a witnessed episode of generalized shaking with urinary incontinence today, for which she received Lorazepam 2mg IV x 1.      MEDICATIONS:  acetaminophen     Tablet .. 650 milliGRAM(s) Oral every 6 hours PRN  ARIPiprazole 2 milliGRAM(s) Oral daily  bisacodyl 5 milliGRAM(s) Oral every 12 hours PRN  donepezil 5 milliGRAM(s) Oral at bedtime  enoxaparin Injectable 40 milliGRAM(s) SubCutaneous every 24 hours  ferrous    sulfate 325 milliGRAM(s) Oral daily  fluticasone propionate 50 MICROgram(s)/spray Nasal Spray 1 Spray(s) Both Nostrils two times a day  hydrOXYzine hydrochloride 25 milliGRAM(s) Oral three times a day PRN  levETIRAcetam  IVPB 500 milliGRAM(s) IV Intermittent every 12 hours  polyethylene glycol 3350 17 Gram(s) Oral daily  senna 2 Tablet(s) Oral at bedtime  traZODone 100 milliGRAM(s) Oral at bedtime  valproate sodium  IVPB 500 milliGRAM(s) IV Intermittent two times a day      ALLERGIES:  No Known Allergies      REVIEW OF SYSTEMS:  Fourteen systems reviewed and negative or unobtainable except as in HPI / Interval History.        OBJECTIVE:  Vital Signs Last 24 Hrs  T(C): 37.4 (21 Oct 2022 20:34), Max: 37.4 (21 Oct 2022 20:34)  T(F): 99.4 (21 Oct 2022 20:34), Max: 99.4 (21 Oct 2022 20:34)  HR: 71 (21 Oct 2022 23:52) (60 - 93)  BP: 82/47 (21 Oct 2022 23:52) (79/37 - 137/95)  BP(mean): 59 (21 Oct 2022 23:52) (47 - 71)  RR: 17 (21 Oct 2022 20:34) (16 - 20)  SpO2: 100% (21 Oct 2022 20:34) (95% - 100%)  Parameters below as of 21 Oct 2022 20:34  Patient On (Oxygen Delivery Method): nasal cannula  O2 Flow (L/min): 2      General Exam:  General: No apparent acute distress  Respiratory: Diminished breath sounds b/l  Cardiovascular: RRR, Weak b/l radial and pedal pulses    Neurological Exam:  General / Mental Status: Nonverbal, Does not follow commands.  Neurological exam is limited.  Cranial Nerves: PERRL, Blink to threat sluggish b/l, Does not track finger movements with eyes b/l.  Grimaces to pinprick at b/l V1-V3.  No response to snap at b/l ears.  No apparent facial asymmetry.  Midline palate and tongue.  No resistance to passive motion of neck b/l.  Motor: Diminished bulk and tone in all four extremities.  All four extremities drift to bed after passive elevation.  No spontaneous movement, rigidity, or spasticity in any of the four extremities.  Sensation: Withdraws all four extremities to pinch.  Reflexes: 1+ and symmetric at b/l biceps, triceps, brachioradialis, patellae, and ankles.  Toes mute b/l.  Unable to assess coordination, Romberg sign, or gait due to encephalopathy.          LABORATORY VALUES:                          13.3   7.76  )-----------( 169      ( 21 Oct 2022 09:29 )             40.4       10-21    142  |  106  |  16  ----------------------------<  79  4.7   |  31  |  0.97    Ca    8.4      21 Oct 2022 09:29  Phos  3.5     10-21  Mg     2.1     10-21    TPro  7.2  /  Alb  2.8<L>  /  TBili  0.3  /  DBili  x   /  AST  53<H>  /  ALT  40  /  AlkPhos  79  10-20    Glucose Trend  10-21-22 @ 09:29   -  -- 79 --  10-20-22 @ 19:45   -  -- 89 --  10-20-22 @ 18:29   -  -- -- 91    Valproic Acid Level, Serum: 80 ug/mL (10.20.22 @ 20:10)           NEUROIMAGING:        CT Head (10/21/22):  - No acute intracranial abnormality  - Diffuse atrophy with prominent ventricles        Routine EEG (10/21/22):  - Bifrontal sharp waves, left greater than right  - Moderate generalized background slowing  - No subclinical seizures identified          Please contact the Neurology consult service with any neurological questions.      Sourav Cardenas MD   of Neurology  [facility]

## 2022-10-21 NOTE — H&P ADULT - NSHPSOCIALHISTORY_GEN_ALL_CORE
Lives at home with mother and father, used to attend special school however has stopped going since first seizure around the time of pandemic, no smoking, no EtOH, no illicit substances

## 2022-10-21 NOTE — H&P ADULT - HISTORY OF PRESENT ILLNESS
Patient is 41F with PMH of down syndrome, cataracts, and epilepsy BIBEMS after witnessed 20-30 minute tonic clonic seizure. Per patients mother at bedside patient was lying on the couch at home when she began "showing signs of discomfort" and was "not steady with movements". Describes the patient shaking all over with elbows flexed, moving torso side to side, and neck extended and in lateral flexion. Mother says that she placed a stick in the patients mouth to keep her from biting her tongue. Denies urinary or fecal incontinence or any trauma sustained during seizure. Says the patients last seizure was in early October with similar presentation that also included eyes rolling upward and tongue biting. Reports that patient's first seizure was around the time of the start of the COVID-19 pandemic and that since her first seizure patient no longer speaks. Says seizures usually last 20 minutes and postictal period is 30 minutes to an hour, however most recent seizures and post ictal period have lasted longer. States that patient is still not at baseline during time of interview and exam with intermittent shaking of limbs and signs of distress. Confirms compliance with current medications. Reports that patient had diarrhea recently followed by constipation. Denies sick contacts and any other signs or symptoms of illness.     In ED:  VS 98.3 HR 68 /84  s/p Depakene 500mg, Midazolam 2mg x3, Lorazepam 2mg, Haldol 2.5 mg, Keppra 1g, Benadryl 25mg    During exam patient started having seizure like episode confirmed by mother lasting ~30 seconds with posturing and convulsions as described above along with facial grimacing and urinary incontinence and given additional dose of Lorazepam 1mg IV.

## 2022-10-22 LAB
ANION GAP SERPL CALC-SCNC: 5 MMOL/L — SIGNIFICANT CHANGE UP (ref 5–17)
BUN SERPL-MCNC: 18 MG/DL — SIGNIFICANT CHANGE UP (ref 7–18)
CALCIUM SERPL-MCNC: 8.3 MG/DL — LOW (ref 8.4–10.5)
CHLORIDE SERPL-SCNC: 111 MMOL/L — HIGH (ref 96–108)
CO2 SERPL-SCNC: 28 MMOL/L — SIGNIFICANT CHANGE UP (ref 22–31)
CREAT SERPL-MCNC: 0.88 MG/DL — SIGNIFICANT CHANGE UP (ref 0.5–1.3)
CULTURE RESULTS: SIGNIFICANT CHANGE UP
EGFR: 85 ML/MIN/1.73M2 — SIGNIFICANT CHANGE UP
GLUCOSE SERPL-MCNC: 89 MG/DL — SIGNIFICANT CHANGE UP (ref 70–99)
HCT VFR BLD CALC: 36.3 % — SIGNIFICANT CHANGE UP (ref 34.5–45)
HGB BLD-MCNC: 11.9 G/DL — SIGNIFICANT CHANGE UP (ref 11.5–15.5)
MCHC RBC-ENTMCNC: 32.8 GM/DL — SIGNIFICANT CHANGE UP (ref 32–36)
MCHC RBC-ENTMCNC: 32.9 PG — SIGNIFICANT CHANGE UP (ref 27–34)
MCV RBC AUTO: 100.3 FL — HIGH (ref 80–100)
NRBC # BLD: 0 /100 WBCS — SIGNIFICANT CHANGE UP (ref 0–0)
PLATELET # BLD AUTO: 165 K/UL — SIGNIFICANT CHANGE UP (ref 150–400)
POTASSIUM SERPL-MCNC: 3.8 MMOL/L — SIGNIFICANT CHANGE UP (ref 3.5–5.3)
POTASSIUM SERPL-SCNC: 3.8 MMOL/L — SIGNIFICANT CHANGE UP (ref 3.5–5.3)
RBC # BLD: 3.62 M/UL — LOW (ref 3.8–5.2)
RBC # FLD: 13.5 % — SIGNIFICANT CHANGE UP (ref 10.3–14.5)
SODIUM SERPL-SCNC: 144 MMOL/L — SIGNIFICANT CHANGE UP (ref 135–145)
SPECIMEN SOURCE: SIGNIFICANT CHANGE UP
WBC # BLD: 13.81 K/UL — HIGH (ref 3.8–10.5)
WBC # FLD AUTO: 13.81 K/UL — HIGH (ref 3.8–10.5)

## 2022-10-22 PROCEDURE — 99232 SBSQ HOSP IP/OBS MODERATE 35: CPT

## 2022-10-22 PROCEDURE — 71250 CT THORAX DX C-: CPT | Mod: 26

## 2022-10-22 PROCEDURE — 99233 SBSQ HOSP IP/OBS HIGH 50: CPT

## 2022-10-22 RX ORDER — LEVETIRACETAM 250 MG/1
750 TABLET, FILM COATED ORAL EVERY 12 HOURS
Refills: 0 | Status: DISCONTINUED | OUTPATIENT
Start: 2022-10-22 | End: 2022-10-24

## 2022-10-22 RX ORDER — AZITHROMYCIN 500 MG/1
500 TABLET, FILM COATED ORAL ONCE
Refills: 0 | Status: COMPLETED | OUTPATIENT
Start: 2022-10-22 | End: 2022-10-22

## 2022-10-22 RX ORDER — SODIUM CHLORIDE 9 MG/ML
500 INJECTION INTRAMUSCULAR; INTRAVENOUS; SUBCUTANEOUS ONCE
Refills: 0 | Status: COMPLETED | OUTPATIENT
Start: 2022-10-22 | End: 2022-10-22

## 2022-10-22 RX ORDER — MINERAL OIL
133 OIL (ML) MISCELLANEOUS ONCE
Refills: 0 | Status: COMPLETED | OUTPATIENT
Start: 2022-10-22 | End: 2022-10-22

## 2022-10-22 RX ORDER — LEVETIRACETAM 250 MG/1
500 TABLET, FILM COATED ORAL EVERY 12 HOURS
Refills: 0 | Status: DISCONTINUED | OUTPATIENT
Start: 2022-10-22 | End: 2022-10-22

## 2022-10-22 RX ORDER — VALPROIC ACID (AS SODIUM SALT) 250 MG/5ML
500 SOLUTION, ORAL ORAL
Refills: 0 | Status: DISCONTINUED | OUTPATIENT
Start: 2022-10-22 | End: 2022-10-22

## 2022-10-22 RX ORDER — AZITHROMYCIN 500 MG/1
TABLET, FILM COATED ORAL
Refills: 0 | Status: DISCONTINUED | OUTPATIENT
Start: 2022-10-22 | End: 2022-10-25

## 2022-10-22 RX ORDER — CEFTRIAXONE 500 MG/1
INJECTION, POWDER, FOR SOLUTION INTRAMUSCULAR; INTRAVENOUS
Refills: 0 | Status: DISCONTINUED | OUTPATIENT
Start: 2022-10-22 | End: 2022-10-25

## 2022-10-22 RX ORDER — CEFTRIAXONE 500 MG/1
1000 INJECTION, POWDER, FOR SOLUTION INTRAMUSCULAR; INTRAVENOUS EVERY 24 HOURS
Refills: 0 | Status: DISCONTINUED | OUTPATIENT
Start: 2022-10-23 | End: 2022-10-25

## 2022-10-22 RX ORDER — AZITHROMYCIN 500 MG/1
500 TABLET, FILM COATED ORAL EVERY 24 HOURS
Refills: 0 | Status: DISCONTINUED | OUTPATIENT
Start: 2022-10-23 | End: 2022-10-25

## 2022-10-22 RX ORDER — CEFTRIAXONE 500 MG/1
1000 INJECTION, POWDER, FOR SOLUTION INTRAMUSCULAR; INTRAVENOUS ONCE
Refills: 0 | Status: COMPLETED | OUTPATIENT
Start: 2022-10-22 | End: 2022-10-22

## 2022-10-22 RX ADMIN — LEVETIRACETAM 400 MILLIGRAM(S): 250 TABLET, FILM COATED ORAL at 17:49

## 2022-10-22 RX ADMIN — Medication 100 MILLIGRAM(S): at 21:16

## 2022-10-22 RX ADMIN — AZITHROMYCIN 255 MILLIGRAM(S): 500 TABLET, FILM COATED ORAL at 21:58

## 2022-10-22 RX ADMIN — ENOXAPARIN SODIUM 40 MILLIGRAM(S): 100 INJECTION SUBCUTANEOUS at 10:54

## 2022-10-22 RX ADMIN — Medication 1 SPRAY(S): at 17:39

## 2022-10-22 RX ADMIN — CEFTRIAXONE 1000 MILLIGRAM(S): 500 INJECTION, POWDER, FOR SOLUTION INTRAMUSCULAR; INTRAVENOUS at 21:57

## 2022-10-22 RX ADMIN — SODIUM CHLORIDE 500 MILLILITER(S): 9 INJECTION INTRAMUSCULAR; INTRAVENOUS; SUBCUTANEOUS at 08:42

## 2022-10-22 RX ADMIN — ARIPIPRAZOLE 2 MILLIGRAM(S): 15 TABLET ORAL at 13:48

## 2022-10-22 RX ADMIN — DONEPEZIL HYDROCHLORIDE 5 MILLIGRAM(S): 10 TABLET, FILM COATED ORAL at 21:17

## 2022-10-22 RX ADMIN — LEVETIRACETAM 420 MILLIGRAM(S): 250 TABLET, FILM COATED ORAL at 01:11

## 2022-10-22 RX ADMIN — POLYETHYLENE GLYCOL 3350 17 GRAM(S): 17 POWDER, FOR SOLUTION ORAL at 13:17

## 2022-10-22 RX ADMIN — Medication 1 SPRAY(S): at 05:37

## 2022-10-22 RX ADMIN — Medication 133 MILLILITER(S): at 16:45

## 2022-10-22 RX ADMIN — Medication 325 MILLIGRAM(S): at 13:16

## 2022-10-22 RX ADMIN — SENNA PLUS 2 TABLET(S): 8.6 TABLET ORAL at 21:17

## 2022-10-22 NOTE — PHYSICAL THERAPY INITIAL EVALUATION ADULT - SIT-TO-STAND BALANCE
Spoke with Dr. Goodpasture for an update on pt's status with SVE, ctx pattern
and epidural placement. Also reviewed FHR decel with interventions used during
decel and return to baseline. Continue pitocin infusion as ordered. No new
orders at this time. fair plus

## 2022-10-22 NOTE — PHYSICAL THERAPY INITIAL EVALUATION ADULT - ADDITIONAL COMMENTS
History taken from mother at bedside: Patient was verbal and ambulating independently until 2 years ago, when she received Covid - vaccine. Mother reports that after Covid Vaccine, patient starts having seizure episodes. She then became non-verbal and dependent in ADL's and ambulation.

## 2022-10-22 NOTE — PHYSICAL THERAPY INITIAL EVALUATION ADULT - DIAGNOSIS, PT EVAL
Patient with decrease functional mobility, generalized weakness and decrease OOB activities, decrease balance and cognitive decline.

## 2022-10-22 NOTE — PHYSICAL THERAPY INITIAL EVALUATION ADULT - GENERAL OBSERVATIONS, REHAB EVAL
Patient was seen for PT evaluation today. EMR, laboratory and radiology results reviewed. Pt received supine in bed, NAD, family member in room.

## 2022-10-22 NOTE — PHYSICAL THERAPY INITIAL EVALUATION ADULT - TRANSFER SKILLS, REHAB EVAL
[FreeTextEntry1] : \par \par \par \par \par The patient has   NOT COME    to the VISIT \par \par This Assessment  is  in a note Titled:  CHART UPDATE   which  only reflects a summary and review of records\par The Assessment below is tentative and preliminary.  It is based only\par on information gathered from chart review and will need modification once the History is taken from the patient and the patient is examined.\par \par \par \par 2. Hemorrhoids:  well - controlled.  No pain,  swelling,  itch,  bleeding\par * Discussed the potential complications of thrombosis,  pain,  infection,  swelling, itching,  bleeding \par Recommendations: \par * High - Fiber Diet was reviewed and emphasized\par * 6  --  8 cups of decaffeinated fluid daily was emphasized \par * Sitz Bathes BID,  No:  Anusol HC  Suppos / Cream  CT BID -- was needed\par * No:  Tucks BID,   No:  Balneol Lotion,   No:  Calmoseptine Oint -- was needed ;    ++ Prep H prn\par * No:  need for  Colorectal surgical evaluation for possible ablation w Dr --  was emphasized\par \par \par \par \par \par 3. Colorectal Cancer Screening:  to be evaluated\par   Utilizing teaching posters and anatomical models the following were discussed and emphasized with the patient in detail: \par * Discussed the pre-malignant potential of polyps\par * Discussed the importance of f/u surveillance / screening colonoscopy\par * High-Fiber Diet was reviewed and emphasized\par * Anti-oxidants and ASA/NSAID Therapy reviewed\par * Given age > 40-51 yo\par * Recommend Colonoscopy\par * R/O  Colonic Neoplasia\par \par \par \par \par \par Informed Consent:\par * The risks & Benefits of EGD  /  Colonoscopy were discussed w patient.\par * This included but was not limited to perforation, bleeding, sedation /med rxns possibly requiring surgery, blood transfusions, antibiotics & CPR/Intubation.\par * Pt. understands & agrees to the procedures.\par The following instructions in regards to the prep and medically essential ( cardiac, pulmonary, sz, psych, endocrine)  pre-op medication administration\par was reviewed and emphasized with the patient . \par * Pt. advised to D/C  ASA/NSAIDs  7  Days   PTP.\par * [ +++ ]  Dulcolax / Miralax / Mag. Citrate ,  [     ] Prepopik/ Clenpiq ,  [     ] Osmo Prep,  [    ] GoLytely,  prep. reviewed w Pt.\par * Hold  [           ] AM of procedure.\par * Hold  [           ] PM of procedure.\par * Take  [           ] PM of procedure.\par * Take  [           ] AM of procedure.\par \par 
needs device and assist

## 2022-10-22 NOTE — PROGRESS NOTE ADULT - SUBJECTIVE AND OBJECTIVE BOX
[facility]    Patient is a 41y old  Female who presents with a chief complaint of seizure (21 Oct 2022 23:43)      SUBJECTIVE / OVERNIGHT EVENTS: Patient hypotensive overnight to SBP of the 70's-80's s/p IVF bolus with some response. Communicated with patient's mother via [place]  #[id number]. Mother reports patient does not seem much improved as she remains sleeping all the time. Advised mother of plan to get CT chest given GGO on CXR and also that EEG did not show seizures. Explained that due to an intravenous VPA shortage, would be using IV Keppra, an alternative medication, to control seizures and involuntary movements.    MEDICATIONS  (STANDING):  ARIPiprazole 2 milliGRAM(s) Oral daily  donepezil 5 milliGRAM(s) Oral at bedtime  enoxaparin Injectable 40 milliGRAM(s) SubCutaneous every 24 hours  ferrous    sulfate 325 milliGRAM(s) Oral daily  fluticasone propionate 50 MICROgram(s)/spray Nasal Spray 1 Spray(s) Both Nostrils two times a day  levETIRAcetam  IVPB 750 milliGRAM(s) IV Intermittent every 12 hours  polyethylene glycol 3350 17 Gram(s) Oral daily  senna 2 Tablet(s) Oral at bedtime  traZODone 100 milliGRAM(s) Oral at bedtime    MEDICATIONS  (PRN):  acetaminophen     Tablet .. 650 milliGRAM(s) Oral every 6 hours PRN Temp greater or equal to 38C (100.4F), Mild Pain (1 - 3)  bisacodyl 5 milliGRAM(s) Oral every 12 hours PRN Constipation  hydrOXYzine hydrochloride 25 milliGRAM(s) Oral three times a day PRN Anxiety      Vital Signs Last 24 Hrs  T(C): 36.6 (10-22-22 @ 14:30)  T(F): 97.9 (10-22-22 @ 14:30), Max: 98.5 (10-22-22 @ 00:46)  HR: 85 (10-22-22 @ 14:30) (67 - 95)  BP: 85/99 (10-22-22 @ 14:30)  BP(mean): 59 (10-21-22 @ 23:52) (59 - 59)  RR: 17 (10-22-22 @ 14:30) (16 - 18)  SpO2: 97% (10-22-22 @ 14:30) (93% - 97%)  Wt(kg): --    10-21 @ 07:01  -  10-22 @ 07:00  --------------------------------------------------------  IN: 0 mL / OUT: 200 mL / NET: -200 mL      CAPILLARY BLOOD GLUCOSE        I&O's Summary    21 Oct 2022 07:01  -  22 Oct 2022 07:00  --------------------------------------------------------  IN: 0 mL / OUT: 200 mL / NET: -200 mL        PHYSICAL EXAM:  GENERAL: NAD, well-developed, initially sleeping but earily arrousable  HEAD:  Atraumatic, flattened facies, low set ears  EYES:  conjunctiva and sclera clear  NECK: Supple, No JVD  CHEST/LUNG: Clear to auscultation bilaterally; No wheeze, rhonchi, rales  HEART: Regular rate and rhythm; No murmurs, rubs, or gallops  ABDOMEN: Soft, Nontender, Nondistended; Bowel sounds present  EXTREMITIES:  2+ Peripheral Pulses, No clubbing, cyanosis, or edema  PSYCH: AAOx0, non-verbal  NEUROLOGY: unable to assess due to mental status  SKIN: No rashes or lesions    LABS:                        11.9   13.81 )-----------( 165      ( 22 Oct 2022 07:40 )             36.3     10-22    144  |  111<H>  |  18  ----------------------------<  89  3.8   |  28  |  0.88    Ca    8.3<L>      22 Oct 2022 07:40  Phos  3.5     10-21  Mg     2.1     10-21            Urinalysis Basic - ( 21 Oct 2022 14:37 )    Color: Yellow / Appearance: Clear / S.005 / pH: x  Gluc: x / Ketone: Negative  / Bili: Negative / Urobili: Negative   Blood: x / Protein: 15 / Nitrite: Negative   Leuk Esterase: Negative / RBC: 0-2 /HPF / WBC 0-2 /HPF   Sq Epi: x / Non Sq Epi: Occasional /HPF / Bacteria: Few /HPF        RADIOLOGY & ADDITIONAL TESTS:    Imaging Personally Reviewed:    Consultant(s) Notes Reviewed:  neurology    Care Discussed with Consultants/Other Providers: Dr. Cardenas (neuro) re: VPA shortage, recommends Keppra 750mg IV BID    Assessment and Plan:

## 2022-10-22 NOTE — PHYSICAL THERAPY INITIAL EVALUATION ADULT - LEVEL OF INDEPENDENCE: SIT/STAND, REHAB EVAL
Patient was initially resisting to stand and walk away from bed/moderate assist (50% patients effort)

## 2022-10-22 NOTE — PHYSICAL THERAPY INITIAL EVALUATION ADULT - PERTINENT HX OF CURRENT PROBLEM, REHAB EVAL
Patient is 41F with PMH of down syndrome, cataracts, and epilepsy BIBEMS after witnessed 20-30 minute tonic clonic seizure. Per patients mother at bedside patient was lying on the couch at home when she began "showing signs of discomfort" and was "not steady with movements". Describes the patient shaking all over with elbows flexed, moving torso side to side, and neck extended and in lateral flexion. Mother says that she placed a stick in the patients mouth to keep her from biting her tongue. Denies urinary or fecal incontinence or any trauma sustained during seizure. Says the patients last seizure was in early October with similar presentation that also included eyes rolling upward and tongue biting. Reports that patient's first seizure was around the time of the start of the COVID-19 pandemic and that since her first seizure patient no longer speaks. Says seizures usually last 20 minutes and postictal period is 30 minutes to an hour, however most recent seizures and post ictal period have lasted longer. States that patient is still not at baseline during time of interview and exam with intermittent shaking of limbs and signs of distress. Confirms compliance with current medications. Reports that patient had diarrhea recently followed by constipation. Denies sick contacts and any other signs or symptoms of illness.

## 2022-10-23 ENCOUNTER — TRANSCRIPTION ENCOUNTER (OUTPATIENT)
Age: 42
End: 2022-10-23

## 2022-10-23 DIAGNOSIS — E87.6 HYPOKALEMIA: ICD-10-CM

## 2022-10-23 DIAGNOSIS — J18.9 PNEUMONIA, UNSPECIFIED ORGANISM: ICD-10-CM

## 2022-10-23 LAB
ANION GAP SERPL CALC-SCNC: 7 MMOL/L — SIGNIFICANT CHANGE UP (ref 5–17)
BUN SERPL-MCNC: 13 MG/DL — SIGNIFICANT CHANGE UP (ref 7–18)
CALCIUM SERPL-MCNC: 8.5 MG/DL — SIGNIFICANT CHANGE UP (ref 8.4–10.5)
CHLORIDE SERPL-SCNC: 110 MMOL/L — HIGH (ref 96–108)
CO2 SERPL-SCNC: 27 MMOL/L — SIGNIFICANT CHANGE UP (ref 22–31)
CREAT SERPL-MCNC: 0.81 MG/DL — SIGNIFICANT CHANGE UP (ref 0.5–1.3)
EGFR: 93 ML/MIN/1.73M2 — SIGNIFICANT CHANGE UP
GLUCOSE SERPL-MCNC: 96 MG/DL — SIGNIFICANT CHANGE UP (ref 70–99)
HCT VFR BLD CALC: 33.5 % — LOW (ref 34.5–45)
HGB BLD-MCNC: 11 G/DL — LOW (ref 11.5–15.5)
MCHC RBC-ENTMCNC: 32.8 GM/DL — SIGNIFICANT CHANGE UP (ref 32–36)
MCHC RBC-ENTMCNC: 32.8 PG — SIGNIFICANT CHANGE UP (ref 27–34)
MCV RBC AUTO: 100 FL — SIGNIFICANT CHANGE UP (ref 80–100)
NRBC # BLD: 0 /100 WBCS — SIGNIFICANT CHANGE UP (ref 0–0)
PLATELET # BLD AUTO: 146 K/UL — LOW (ref 150–400)
POTASSIUM SERPL-MCNC: 3.4 MMOL/L — LOW (ref 3.5–5.3)
POTASSIUM SERPL-SCNC: 3.4 MMOL/L — LOW (ref 3.5–5.3)
RBC # BLD: 3.35 M/UL — LOW (ref 3.8–5.2)
RBC # FLD: 13.7 % — SIGNIFICANT CHANGE UP (ref 10.3–14.5)
SODIUM SERPL-SCNC: 144 MMOL/L — SIGNIFICANT CHANGE UP (ref 135–145)
WBC # BLD: 7.59 K/UL — SIGNIFICANT CHANGE UP (ref 3.8–10.5)
WBC # FLD AUTO: 7.59 K/UL — SIGNIFICANT CHANGE UP (ref 3.8–10.5)

## 2022-10-23 PROCEDURE — 99232 SBSQ HOSP IP/OBS MODERATE 35: CPT

## 2022-10-23 RX ORDER — POTASSIUM CHLORIDE 20 MEQ
10 PACKET (EA) ORAL
Refills: 0 | Status: COMPLETED | OUTPATIENT
Start: 2022-10-23 | End: 2022-10-23

## 2022-10-23 RX ADMIN — Medication 100 MILLIEQUIVALENT(S): at 10:11

## 2022-10-23 RX ADMIN — Medication 100 MILLIEQUIVALENT(S): at 13:01

## 2022-10-23 RX ADMIN — Medication 1 SPRAY(S): at 18:05

## 2022-10-23 RX ADMIN — LEVETIRACETAM 400 MILLIGRAM(S): 250 TABLET, FILM COATED ORAL at 05:43

## 2022-10-23 RX ADMIN — Medication 1 SPRAY(S): at 05:44

## 2022-10-23 RX ADMIN — Medication 325 MILLIGRAM(S): at 11:37

## 2022-10-23 RX ADMIN — LEVETIRACETAM 400 MILLIGRAM(S): 250 TABLET, FILM COATED ORAL at 18:06

## 2022-10-23 RX ADMIN — ENOXAPARIN SODIUM 40 MILLIGRAM(S): 100 INJECTION SUBCUTANEOUS at 07:59

## 2022-10-23 RX ADMIN — Medication 650 MILLIGRAM(S): at 18:44

## 2022-10-23 RX ADMIN — Medication 650 MILLIGRAM(S): at 19:14

## 2022-10-23 RX ADMIN — POLYETHYLENE GLYCOL 3350 17 GRAM(S): 17 POWDER, FOR SOLUTION ORAL at 11:38

## 2022-10-23 RX ADMIN — ARIPIPRAZOLE 2 MILLIGRAM(S): 15 TABLET ORAL at 11:37

## 2022-10-23 RX ADMIN — CEFTRIAXONE 100 MILLIGRAM(S): 500 INJECTION, POWDER, FOR SOLUTION INTRAMUSCULAR; INTRAVENOUS at 21:17

## 2022-10-23 RX ADMIN — SENNA PLUS 2 TABLET(S): 8.6 TABLET ORAL at 21:18

## 2022-10-23 RX ADMIN — Medication 100 MILLIGRAM(S): at 21:18

## 2022-10-23 RX ADMIN — DONEPEZIL HYDROCHLORIDE 5 MILLIGRAM(S): 10 TABLET, FILM COATED ORAL at 21:18

## 2022-10-23 RX ADMIN — AZITHROMYCIN 255 MILLIGRAM(S): 500 TABLET, FILM COATED ORAL at 21:17

## 2022-10-23 RX ADMIN — Medication 100 MILLIEQUIVALENT(S): at 11:37

## 2022-10-23 NOTE — DISCHARGE NOTE PROVIDER - HOSPITAL COURSE
41F with PMH of Down Syndrome, cataracts, and epilepsy BIBEMS after witnessed 20-30 minute tonic clonic seizure. Admitted for monitoring and management of active seizures.    s/p Depakene 500mg, Midazolam 2mg x3, Lorazepam 1mg and 2mg, Haldol 2.5 mg, Keppra 1g, Benadryl 25mg in ED. CT head shows no intracranial hemorrhage or mass effect. Neurology Consulted: Dr. Cardenas recs appreciated, EEG does not report seizures . on Keppra 750mg IV BID as per neurology recommendations. hospital course c/b ,Infectious work-up revealing multi-focal PNA. CT chest showing multifocal PNA. on ceftriaxone and azithromycin. pending S/S evaluation mother reports pt. has difficulty with solid foods. CT abdomen: Abundant fecal material, Mild bladder wall thickening. s/p enema with good response. urine culture growing 50,000-99,000 Pseudomonas.        Please note that this a brief summary of hospital course please refer to daily progress notes and consult notes for full course and events      incomplete .................   Patient is 41F with PMH of Down Syndrome, cataracts, and epilepsy BIBEMS after witnessed 20-30 minute tonic clonic seizure. Admitted for monitoring and management of active seizures. Neurology Dr. Cardenas consulted recommended EEG which showed seizure tendency in both frontal regions, but did not show any active seizure. Patient started on IV Keppra secondary to patient not tolerating PO. Speech and swallow consulted. Hospital course c/b ,Infectious work-up revealing multifocal PNA on CT chest, started on IV Ceftriaxone and Azithro. CT abd showed Abundant fecal material, Mild bladder wall thickening. s/p enema with good response. Blood Cx NGTD, UCX 50-90K Pseudomonas. Sns recommends soft and bite sized with thick liquids. PT recommend home PT        Given clinical course decision made to discharge patient. Discharge discussed with attending.   Please note that this a brief summary of hospital course please refer to daily progress notes and consult notes for full course and events         Patient is 41F with PMH of Down Syndrome, cataracts, and epilepsy BIBEMS after witnessed 20-30 minute tonic clonic seizure. Admitted for monitoring and management of active seizures. Neurology Dr. Cardenas consulted recommended EEG which showed seizure tendency in both frontal regions, but did not show any active seizure. Patient started on IV Keppra secondary to patient not tolerating PO. Speech and swallow consulted. Hospital course c/b ,Infectious work-up revealing multifocal PNA on CT chest, started on IV Ceftriaxone and Azithro. CT abd showed Abundant fecal material, Mild bladder wall thickening. s/p enema with good response. Blood Cx NGTD, UCX 50-90K Pseudomonas. Sns recommends soft and bite sized with thick liquids. PT recommend home PT. Updated patient mom at bedside regarding patient's discharge, via  services ID # [id number].       Given clinical course decision made to discharge patient. Discharge discussed with attending.   Please note that this a brief summary of hospital course please refer to daily progress notes and consult notes for full course and events

## 2022-10-23 NOTE — PROGRESS NOTE ADULT - SUBJECTIVE AND OBJECTIVE BOX
[facility] Medicine    Patient is a 41y old  Female who presents with a chief complaint of seizure (22 Oct 2022 21:04)      SUBJECTIVE / OVERNIGHT EVENTS: Patient went for CT chest overnight that showed multifocal PNA. Started on ceftriaxone and azithromycin. Communicated with patient via [place]  #[id number], [place]. More alert and awake today per mother at bedside. Ate some breakfast but was only able to tolerate the soft items. Vomited up the more solid items. Mother feels Indu is doing better but is not at her baseline just yet.     MEDICATIONS  (STANDING):  ARIPiprazole 2 milliGRAM(s) Oral daily  azithromycin  IVPB      azithromycin  IVPB 500 milliGRAM(s) IV Intermittent every 24 hours  cefTRIAXone   IVPB 1000 milliGRAM(s) IV Intermittent every 24 hours  cefTRIAXone   IVPB      donepezil 5 milliGRAM(s) Oral at bedtime  enoxaparin Injectable 40 milliGRAM(s) SubCutaneous every 24 hours  ferrous    sulfate 325 milliGRAM(s) Oral daily  fluticasone propionate 50 MICROgram(s)/spray Nasal Spray 1 Spray(s) Both Nostrils two times a day  levETIRAcetam  IVPB 750 milliGRAM(s) IV Intermittent every 12 hours  polyethylene glycol 3350 17 Gram(s) Oral daily  senna 2 Tablet(s) Oral at bedtime  traZODone 100 milliGRAM(s) Oral at bedtime    MEDICATIONS  (PRN):  acetaminophen     Tablet .. 650 milliGRAM(s) Oral every 6 hours PRN Temp greater or equal to 38C (100.4F), Mild Pain (1 - 3)  bisacodyl 5 milliGRAM(s) Oral every 12 hours PRN Constipation  hydrOXYzine hydrochloride 25 milliGRAM(s) Oral three times a day PRN Anxiety      Vital Signs Last 24 Hrs  T(C): 36.7 (10-23-22 @ 14:10)  T(F): 98.1 (10-23-22 @ 14:10), Max: 98.9 (10-22-22 @ 21:57)  HR: 69 (10-23-22 @ 14:10) (69 - 89)  BP: 112/80 (10-23-22 @ 14:10)  BP(mean): 87 (10-23-22 @ 14:10) (87 - 87)  RR: 18 (10-23-22 @ 14:10) (16 - 18)  SpO2: 98% (10-23-22 @ 14:10) (95% - 98%)  Wt(kg): --    CAPILLARY BLOOD GLUCOSE        I&O's Summary      PHYSICAL EXAM:  GENERAL: NAD, well-developed  HEAD:  Atraumatic, Normocephalic  EYES: EOMI, PERRLA, conjunctiva and sclera clear  NECK: Supple, No JVD  CHEST/LUNG: Clear to auscultation bilaterally; No wheeze  HEART: Regular rate and rhythm; No murmurs, rubs, or gallops  ABDOMEN: Soft, Nontender, Nondistended; Bowel sounds present  EXTREMITIES:  2+ Peripheral Pulses, No clubbing, cyanosis, or edema  PSYCH: AAOx3  NEUROLOGY: non-focal  SKIN: No rashes or lesions    LABS:                        11.0   7.59  )-----------( 146      ( 23 Oct 2022 07:50 )             33.5     10-23    144  |  110<H>  |  13  ----------------------------<  96  3.4<L>   |  27  |  0.81    Ca    8.5      23 Oct 2022 07:50                RADIOLOGY & ADDITIONAL TESTS:    Imaging Personally Reviewed:    Consultant(s) Notes Reviewed:      Care Discussed with Consultants/Other Providers:    Assessment and Plan: [facility] Medicine    Patient is a 41y old  Female who presents with a chief complaint of seizure (22 Oct 2022 21:04)      SUBJECTIVE / OVERNIGHT EVENTS: Patient went for CT chest overnight that showed multifocal PNA. Started on ceftriaxone and azithromycin. Communicated with patient via Irish  #[id number], [place]. More alert and awake today per mother at bedside. Ate some breakfast but was only able to tolerate the soft items. Vomited up the more solid items. Mother feels Indu is doing better but is not at her baseline just yet.     MEDICATIONS  (STANDING):  ARIPiprazole 2 milliGRAM(s) Oral daily  azithromycin  IVPB      azithromycin  IVPB 500 milliGRAM(s) IV Intermittent every 24 hours  cefTRIAXone   IVPB 1000 milliGRAM(s) IV Intermittent every 24 hours  cefTRIAXone   IVPB      donepezil 5 milliGRAM(s) Oral at bedtime  enoxaparin Injectable 40 milliGRAM(s) SubCutaneous every 24 hours  ferrous    sulfate 325 milliGRAM(s) Oral daily  fluticasone propionate 50 MICROgram(s)/spray Nasal Spray 1 Spray(s) Both Nostrils two times a day  levETIRAcetam  IVPB 750 milliGRAM(s) IV Intermittent every 12 hours  polyethylene glycol 3350 17 Gram(s) Oral daily  senna 2 Tablet(s) Oral at bedtime  traZODone 100 milliGRAM(s) Oral at bedtime    MEDICATIONS  (PRN):  acetaminophen     Tablet .. 650 milliGRAM(s) Oral every 6 hours PRN Temp greater or equal to 38C (100.4F), Mild Pain (1 - 3)  bisacodyl 5 milliGRAM(s) Oral every 12 hours PRN Constipation  hydrOXYzine hydrochloride 25 milliGRAM(s) Oral three times a day PRN Anxiety      Vital Signs Last 24 Hrs  T(C): 36.7 (10-23-22 @ 14:10)  T(F): 98.1 (10-23-22 @ 14:10), Max: 98.9 (10-22-22 @ 21:57)  HR: 69 (10-23-22 @ 14:10) (69 - 89)  BP: 112/80 (10-23-22 @ 14:10)  BP(mean): 87 (10-23-22 @ 14:10) (87 - 87)  RR: 18 (10-23-22 @ 14:10) (16 - 18)  SpO2: 98% (10-23-22 @ 14:10) (95% - 98%)  Wt(kg): --    CAPILLARY BLOOD GLUCOSE        I&O's Summary      PHYSICAL EXAM:  GENERAL: NAD, well-developed,  HEAD:  Atraumatic, flattened facies, low set ears  EYES:  conjunctiva and sclera clear  NECK: Supple, No JVD  CHEST/LUNG: Clear to auscultation bilaterally; No wheeze, rhonchi, rales  HEART: Regular rate and rhythm; No murmurs, rubs, or gallops  ABDOMEN: Soft, Nontender, Nondistended; Bowel sounds present  EXTREMITIES:  2+ Peripheral Pulses, No clubbing, cyanosis, or edema  PSYCH: AAOx0, non-verbal, awake, alert  NEUROLOGY: unable to assess due to mental status  SKIN: No rashes or lesions    LABS:                        11.0   7.59  )-----------( 146      ( 23 Oct 2022 07:50 )             33.5     10-23    144  |  110<H>  |  13  ----------------------------<  96  3.4<L>   |  27  |  0.81    Ca    8.5      23 Oct 2022 07:50      RADIOLOGY & ADDITIONAL TESTS:    Imaging Personally Reviewed:  < from: CT Chest No Cont (10.22.22 @ 18:31) >  IMPRESSION:  Multifocal pneumonia. Follow-up suggested to resolution.    < end of copied text >      Consultant(s) Notes Reviewed:      Care Discussed with Consultants/Other Providers:    Assessment and Plan:

## 2022-10-23 NOTE — CHART NOTE - NSCHARTNOTEFT_GEN_A_CORE
Spoke to pt. mother today at bedside, updated on clinical status, treatment plan/options and discharge plan/options, all questions answered  used  ID [id number]- CECILE

## 2022-10-23 NOTE — DISCHARGE NOTE PROVIDER - ATTENDING DISCHARGE PHYSICAL EXAMINATION:
Gen: NAD, down's syndrome facies  Neuro: alert, not answering qs appropriately, moves all extremities, interactive  HEENT: anicteric, moist oral mucosa  Neck: supple, no JVD elevation  Cards: RRR  Pulm: good inspiratory effort, CTAB  Abd: soft, NT/ND, BS+  Ext: no edema  Skin: warm, dry

## 2022-10-23 NOTE — DISCHARGE NOTE PROVIDER - CARE PROVIDER_API CALL
Sourav Cardenas)  Neurology  Epilepsy  [address], [address]  [place] [address]  Phone: [phone]  Fax: ()-  Follow Up Time: 1 week

## 2022-10-23 NOTE — DISCHARGE NOTE PROVIDER - NSDCCPCAREPLAN_GEN_ALL_CORE_FT
PRINCIPAL DISCHARGE DIAGNOSIS  Diagnosis: Seizure  Assessment and Plan of Treatment:       SECONDARY DISCHARGE DIAGNOSES  Diagnosis: Multifocal pneumonia  Assessment and Plan of Treatment:      PRINCIPAL DISCHARGE DIAGNOSIS  Diagnosis: Seizure  Assessment and Plan of Treatment: You presented to the hospital with Seizures and you were give IV medications to help controle the seizure like- activity . An EEG was performed and it did NOT show that you were having any ACTIVE seizures. You have not had any more episodes of shaking and this has been controlled on the following medications.   Keppra 750mg twice a day  You will also need to follow up with the neurologist within 1 week of discharge.  B?n dã d?n b?nh vi?n v?i ch?ng co gi?t và b?n dã du?c tiêm thu?c mehran du?ng phil m?ch d? giúp ki?m soát ho?t d?ng gi?ng mary alice co gi?t. Ði?n não d? dã du?c th?c hi?n và nó [place] cho th?y b?n ramirez có b?t k? con d?ng kinh CH? Ð?NG nào. B?n không b? run n?a và di?u này dã du?c ki?m soát b?ng các lo?i thu?c danielle dây.  Vui lòng ti?p t?c dùng Keppra 750 miligam pedro luis l?n m?t ngày.  B?n cung s? c?n tái khám v?i bác si chuyên key th?n cameron falconivy 1 tu?n danielle khi miller?t vi?n.        SECONDARY DISCHARGE DIAGNOSES  Diagnosis: Multifocal pneumonia  Assessment and Plan of Treatment: While you were in the hospital we performed Catscan of your chest to make sure there was no infection that could be causing the seizures. IT showed that you had Pneumonia and you were treated with intravenous anitbiotics. We will send a prescription to the pharmacy to send the some more antibiotics to complete at home. Please also follow up with your primary care provider within 1 week of discharge.   Ryne khi b?n ? b?nh vi?n, chúng tôi dã th?c hi?n Catscan ng?c c?a b?n d? d?m b?o r?ng không có steph?m trùng nào có th? gây ra co gi?t. CNTT cho th?y b?n b? Viêm ph?i và b?n dã du?c di?u tr? b?ng anitbiotics tiêm phil m?ch. Chúng tôi s? g?i m?t don thu?c d?n nhà thu?c d? g?i thêm m?t s? kháng sinh d? hoàn thành t?i nhà. Vui lòng liên h? v?i nhà cung c?p d?ch v? amanda sóc chính c?a b?n ryne vòn 1 tu?n danielle khi miller?t vi?n.    Diagnosis: Abdominal pain  Assessment and Plan of Treatment: When the catscan of abdomen was performed it showed that you had a lot of stool in your intestines you should continue to take stool softener to continue to have regular bowel movements.  Khi ti?n alison quét b?ng cho th?y b?n có steph?u sherri michele ru?t, b?n nên ti?p t?c u?ng thu?c làm m?m phân d? ti?p t?c di tiêu d?u d?n.

## 2022-10-23 NOTE — PATIENT PROFILE ADULT - FALL HARM RISK - HARM RISK INTERVENTIONS
Assistance with ambulation/Assistance OOB with selected safe patient handling equipment/Communicate Risk of Fall with Harm to all staff/Discuss with provider need for PT consult/Monitor gait and stability/Reinforce activity limits and safety measures with patient and family/Tailored Fall Risk Interventions/Visual Cue: Yellow wristband and red socks/Bed in lowest position, wheels locked, appropriate side rails in place/Call bell, personal items and telephone in reach/Instruct patient to call for assistance before getting out of bed or chair/Non-slip footwear when patient is out of bed/[place] to call system/Physically safe environment - no spills, clutter or unnecessary equipment/Purposeful Proactive Rounding/Room/bathroom lighting operational, light cord in reach

## 2022-10-23 NOTE — DISCHARGE NOTE PROVIDER - NSDCMRMEDTOKEN_GEN_ALL_CORE_FT
amoxicillin 875 mg oral tablet: 1 tab(s) orally 2 times a day   ARIPIPRAZOLE 2MG TABLETS: TAKE 1 TABLET BY MOUTH EVERY DAY  AZELASTINE 0.05% OPHTH SOLUTION 6ML:   BISACODYL 5MG EC TABLETS: TAKE 1 TABLET BY MOUTH EVERY DAY AS NEEDED  DONEPEZIL 5MG TABLETS: TAKE 1 TABLET BY MOUTH EVERY DAY  FERROUS SULFATE 325MG (5GR) TABS: TAKE 1 TABLET BY MOUTH EVERY DAY  FLUTICASONE 50MCG NASAL SP (120) RX: SHAKE LIQUID AND USE 1 SPRAY IN EACH NOSTRIL TWICE DAILY  HYDROXYZINE HCL 25MG TABS (WHITE): TAKE 1 TABLET BY MOUTH THREE TIMES DAILY AS NEEDED FOR ANXIETY  MULTIVITAMIN TABLETS: TAKE 1 TABLET BY MOUTH EVERY DAY  TRAZODONE 100MG TABLETS: TAKE 1 TABLET BY MOUTH EVERY NIGHT AT BEDTIME  VALPROIC ACID 250MG CAPSULES: TAKE 2 CAPSULES BY MOUTH EVERY 12 HOURS   ARIPIPRAZOLE 2MG TABLETS: 1 each orally once a day  AZELASTINE 0.05% OPHTH SOLUTION 6ML: 1 each to each affected eye once a day  cefpodoxime 200 mg oral tablet: 1 tab(s) orally every 12 hours   DONEPEZIL 5MG TABLETS: 1 each orally once a day  FERROUS SULFATE 325MG (5GR) TABS: 1 each orally once a day  FLUTICASONE 50MCG NASAL SP (120) RX: 1 each in each affected nostril once a day  HYDROXYZINE HCL 25MG TABS (WHITE): 1 each orally 3 times a day, As Needed  levETIRAcetam 750 mg oral tablet, dispersible: 1 tab(s) orally every 12 hours   MiraLax oral powder for reconstitution: 17 gram(s) orally once a day,  DO NOT take if having diarrhea  MULTIVITAMIN TABLETS: 1 each orally once a day  senna (sennosides) 8.6 mg oral tablet: 2 tab(s) orally once a day (at bedtime)   TRAZODONE 100MG TABLETS: 1 each orally once a day (at bedtime)

## 2022-10-24 LAB
-  AMIKACIN: SIGNIFICANT CHANGE UP
-  AMOXICILLIN/CLAVULANIC ACID: SIGNIFICANT CHANGE UP
-  AMPICILLIN/SULBACTAM: SIGNIFICANT CHANGE UP
-  AMPICILLIN: SIGNIFICANT CHANGE UP
-  AZTREONAM: SIGNIFICANT CHANGE UP
-  CEFAZOLIN: SIGNIFICANT CHANGE UP
-  CEFEPIME: SIGNIFICANT CHANGE UP
-  CEFOXITIN: SIGNIFICANT CHANGE UP
-  CEFTRIAXONE: SIGNIFICANT CHANGE UP
-  CIPROFLOXACIN: SIGNIFICANT CHANGE UP
-  ERTAPENEM: SIGNIFICANT CHANGE UP
-  GENTAMICIN: SIGNIFICANT CHANGE UP
-  IMIPENEM: SIGNIFICANT CHANGE UP
-  LEVOFLOXACIN: SIGNIFICANT CHANGE UP
-  MEROPENEM: SIGNIFICANT CHANGE UP
-  NITROFURANTOIN: SIGNIFICANT CHANGE UP
-  PIPERACILLIN/TAZOBACTAM: SIGNIFICANT CHANGE UP
-  TIGECYCLINE: SIGNIFICANT CHANGE UP
-  TOBRAMYCIN: SIGNIFICANT CHANGE UP
-  TRIMETHOPRIM/SULFAMETHOXAZOLE: SIGNIFICANT CHANGE UP
ANION GAP SERPL CALC-SCNC: 6 MMOL/L — SIGNIFICANT CHANGE UP (ref 5–17)
BUN SERPL-MCNC: 8 MG/DL — SIGNIFICANT CHANGE UP (ref 7–18)
CALCIUM SERPL-MCNC: 8.4 MG/DL — SIGNIFICANT CHANGE UP (ref 8.4–10.5)
CHLORIDE SERPL-SCNC: 109 MMOL/L — HIGH (ref 96–108)
CO2 SERPL-SCNC: 28 MMOL/L — SIGNIFICANT CHANGE UP (ref 22–31)
CREAT SERPL-MCNC: 0.74 MG/DL — SIGNIFICANT CHANGE UP (ref 0.5–1.3)
CULTURE RESULTS: SIGNIFICANT CHANGE UP
EGFR: 104 ML/MIN/1.73M2 — SIGNIFICANT CHANGE UP
GLUCOSE SERPL-MCNC: 99 MG/DL — SIGNIFICANT CHANGE UP (ref 70–99)
HCT VFR BLD CALC: 31.3 % — LOW (ref 34.5–45)
HGB BLD-MCNC: 10.6 G/DL — LOW (ref 11.5–15.5)
MCHC RBC-ENTMCNC: 33.5 PG — SIGNIFICANT CHANGE UP (ref 27–34)
MCHC RBC-ENTMCNC: 33.9 GM/DL — SIGNIFICANT CHANGE UP (ref 32–36)
MCV RBC AUTO: 99.1 FL — SIGNIFICANT CHANGE UP (ref 80–100)
METHOD TYPE: SIGNIFICANT CHANGE UP
NRBC # BLD: 0 /100 WBCS — SIGNIFICANT CHANGE UP (ref 0–0)
ORGANISM # SPEC MICROSCOPIC CNT: SIGNIFICANT CHANGE UP
ORGANISM # SPEC MICROSCOPIC CNT: SIGNIFICANT CHANGE UP
PLATELET # BLD AUTO: 164 K/UL — SIGNIFICANT CHANGE UP (ref 150–400)
POTASSIUM SERPL-MCNC: 3.6 MMOL/L — SIGNIFICANT CHANGE UP (ref 3.5–5.3)
POTASSIUM SERPL-SCNC: 3.6 MMOL/L — SIGNIFICANT CHANGE UP (ref 3.5–5.3)
RBC # BLD: 3.16 M/UL — LOW (ref 3.8–5.2)
RBC # FLD: 13.5 % — SIGNIFICANT CHANGE UP (ref 10.3–14.5)
SODIUM SERPL-SCNC: 143 MMOL/L — SIGNIFICANT CHANGE UP (ref 135–145)
SPECIMEN SOURCE: SIGNIFICANT CHANGE UP
WBC # BLD: 4.55 K/UL — SIGNIFICANT CHANGE UP (ref 3.8–10.5)
WBC # FLD AUTO: 4.55 K/UL — SIGNIFICANT CHANGE UP (ref 3.8–10.5)

## 2022-10-24 PROCEDURE — 99232 SBSQ HOSP IP/OBS MODERATE 35: CPT

## 2022-10-24 RX ORDER — LEVETIRACETAM 250 MG/1
750 TABLET, FILM COATED ORAL
Refills: 0 | Status: DISCONTINUED | OUTPATIENT
Start: 2022-10-24 | End: 2022-10-25

## 2022-10-24 RX ADMIN — Medication 325 MILLIGRAM(S): at 12:17

## 2022-10-24 RX ADMIN — POLYETHYLENE GLYCOL 3350 17 GRAM(S): 17 POWDER, FOR SOLUTION ORAL at 12:17

## 2022-10-24 RX ADMIN — Medication 100 MILLIGRAM(S): at 21:42

## 2022-10-24 RX ADMIN — SENNA PLUS 2 TABLET(S): 8.6 TABLET ORAL at 21:49

## 2022-10-24 RX ADMIN — ARIPIPRAZOLE 2 MILLIGRAM(S): 15 TABLET ORAL at 12:17

## 2022-10-24 RX ADMIN — ENOXAPARIN SODIUM 40 MILLIGRAM(S): 100 INJECTION SUBCUTANEOUS at 12:16

## 2022-10-24 RX ADMIN — DONEPEZIL HYDROCHLORIDE 5 MILLIGRAM(S): 10 TABLET, FILM COATED ORAL at 22:23

## 2022-10-24 RX ADMIN — CEFTRIAXONE 100 MILLIGRAM(S): 500 INJECTION, POWDER, FOR SOLUTION INTRAMUSCULAR; INTRAVENOUS at 21:48

## 2022-10-24 RX ADMIN — AZITHROMYCIN 255 MILLIGRAM(S): 500 TABLET, FILM COATED ORAL at 21:48

## 2022-10-24 RX ADMIN — LEVETIRACETAM 400 MILLIGRAM(S): 250 TABLET, FILM COATED ORAL at 06:17

## 2022-10-24 RX ADMIN — Medication 1 SPRAY(S): at 06:15

## 2022-10-24 RX ADMIN — Medication 1 SPRAY(S): at 17:48

## 2022-10-24 RX ADMIN — LEVETIRACETAM 750 MILLIGRAM(S): 250 TABLET, FILM COATED ORAL at 17:48

## 2022-10-24 NOTE — SWALLOW BEDSIDE ASSESSMENT ADULT - SWALLOW EVAL: PATIENT/FAMILY GOALS STATEMENT
Parent stated they would like to find out more about Day programs in the neighborhood that patient would be eligible to attend.

## 2022-10-24 NOTE — SWALLOW BEDSIDE ASSESSMENT ADULT - COMMENTS
Pt  AA+Ox0 nonverbal, cooperative. Parents at bedside, Malay VRI # [id number]. Pt seen seated upright at edge of bed. Family stated they would like Pt to be placed in a day program.

## 2022-10-24 NOTE — SWALLOW BEDSIDE ASSESSMENT ADULT - SWALLOW EVAL: RECOMMENDED FEEDING/EATING TECHNIQUES
allow for swallow between intakes/alternate food with liquid/check mouth frequently for oral residue/pocketing/crush medication (when feasible)/no straws/oral hygiene/position upright (90 degrees)/small sips/bites

## 2022-10-24 NOTE — SWALLOW BEDSIDE ASSESSMENT ADULT - SWALLOW EVAL: DIAGNOSIS
Pt p/w oral dysphagia c/b reduced bolus formation & prolonged mastication, w/ slow A-P transport; However, oropharyngeal swallow was intact and timely with no overt s/s of laryngeal penetration or aspiration at this exam.

## 2022-10-24 NOTE — SWALLOW BEDSIDE ASSESSMENT ADULT - DIET PRIOR TO ADMI
As per parents, Soft & Bite Sized solids at home, w/ thin liquids. No prior episodes of choking. Takes pills crushed.

## 2022-10-24 NOTE — SWALLOW BEDSIDE ASSESSMENT ADULT - POSITIONING
upright (90 degrees) Pt seen seated upright at edge of bed./upright (90 degrees) Tranexamic Acid Counseling:  Patient advised of the small risk of bleeding problems with tranexamic acid. They were also instructed to call if they developed any nausea, vomiting or diarrhea. All of the patient's questions and concerns were addressed.

## 2022-10-24 NOTE — SWALLOW BEDSIDE ASSESSMENT ADULT - SLP PERTINENT HISTORY OF CURRENT PROBLEM
41F with PMH of Down Syndrome, cataracts, and epilepsy BIBEMS after witnessed 20-30 minute tonic clonic seizure. Admitted for monitoring and management of active seizures. Infectious work-up revealing multi-focal PNA. [CT chest showing multifocal PNA. Patient's mother reports Indu has a difficult time with solid foods and vomits after eating the solid food. Thus, there is a concern for gram-negative/aspiration PNA]. h/o epilepsy presenting with continued brief seizure like activity after witnessed 30 min tonic clonic seizure at home

## 2022-10-24 NOTE — PROGRESS NOTE ADULT - ASSESSMENT
Patient is 41F with PMH of Down Syndrome, cataracts, and epilepsy BIBEMS after witnessed 20-30 minute tonic clonic seizure. Admitted for monitoring and management of active seizures. Infectious work-up also in progress.
Patient is 41F with PMH of Down Syndrome, cataracts, and epilepsy BIBEMS after witnessed 20-30 minute tonic clonic seizure. Admitted for monitoring and management of active seizures. Neurology Dr. Cardenas consulted recommended EEG which showed seizure tendency in both frontal regions, but did not show any active seizure. Patient started on IV Keppra secondary to patient not tolerating PO. Speech and swallow consulted. CT of the chest showed multifocal PNA, started on IV Ceftriaxone and Azithro. Blood Cx NGTD, UCX 50-90K Pseudomonas. Sns recommends soft and bite sized with thick liquids. PT recommend home PT
Patient is 41F with PMH of Down Syndrome, cataracts, and epilepsy BIBEMS after witnessed 20-30 minute tonic clonic seizure. Admitted for monitoring and management of active seizures. Infectious work-up revealing multi-focal PNA (?aspiration?)

## 2022-10-24 NOTE — PROGRESS NOTE ADULT - PROBLEM SELECTOR PLAN 2
p/w abdominal tenderness on exam and recent history of diarrhea and constipation per mother  -CT abdomen: Abundant fecal material identified within the colonic loops extending to   mildly distended rectum with suggestion of wall thickening. Mild bladder wall thickening.  -c/w home bisacodyl 5mg PRN BID  -bowel regimen: miralax and senna  -Will give enemas PRN as patient is not currently taking po  -Tylenol PRN q6 for mild pain  -UA negative, UCx negative  -bladder scan negative for retention, repeat UA and Ucx neg for infection
- p/w seizure like activity after witnessed 30 min tonic clonic seizure at home   - s/p Depakene 500mg, Midazolam 2mg x3, Lorazepam 1mg and 2mg, Haldol 2.5 mg, Keppra 1g, Benadryl 25mg  - CT head shows no intracranial hemorrhage or mass effect  - continue home abilify 2mg, trazadone 50mg, clonazepam 0.5mg, hydrozyzine 25mg PRN  - Serum valproic acid level 80  -Neurology Consulted: Dr. Cardenas recs appreciated  - per Neuro seizure tendency in both frontal regions, but did not show any active seizure  - c/w home Donepezil 5mg PO qd to help slow down cognitive decline  - Sns reccs soft and bite sized with thin liquids  - per discussion with Neuro will transition patient to PO Keppra 750 mg BID  - CT chest reveals multifocal PNA, may be the cause of the patient's seizure  -seizure, aspiration, and fall precautions
h/o epilepsy presenting with continued brief seizure like activity after witnessed 30 min tonic clonic seizure at home   -s/p Depakene 500mg, Midazolam 2mg x3, Lorazepam 1mg and 2mg, Haldol 2.5 mg, Keppra 1g, Benadryl 25mg  -CT head shows no intracranial hemorrhage or mass effect. Involutional changes beyond expected for patient's age  -continue home abilify 2mg, trazadone 50mg, clonazepam 0.5mg, hydrozyzine 25mg PRN  -Serum valproic acid level 80  -Neurology Consulted: Dr. Cardenas recs appreciated  -On official read EEG does not report seizures but per discussion with Dr. Cardenas, patient has seizure tendency in the b/L frontal lobes  -c/w home Donepezil 5mg PO qd to help slow down cognitive decline  - Patient not tolerating much po at this time, switched to Keppra 750mg IV BID as per neurology recommendations  -speech and swallow eval   -PT/OT recommends home PT  -CT chest reveals multifocal PNA, may be the cause of the patient's seizure  -UA negative, UCx negative with 50,000-99,000 Pseudomonas. Ceftriaxone will likely cover urine as well  -f/u blood cultures x 2, currently NTD  -seizure, aspiration, and fall precautions

## 2022-10-24 NOTE — PROGRESS NOTE ADULT - PROBLEM SELECTOR PLAN 1
h/o epilepsy presenting with continued brief seizure like activity after witnessed 30 min tonic clonic seizure at home   -s/p Depakene 500mg, Midazolam 2mg x3, Lorazepam 1mg and 2mg, Haldol 2.5 mg, Keppra 1g, Benadryl 25mg  -CT head shows no intracranial hemorrhage or mass effect. Involutional changes beyond expected for patient's age  -continue home abilify 2mg, trazadone 50mg, clonazepam 0.5mg, hydrozyzine 25mg PRN  -Serum valproic acid level 80  -Neurology Consulted: Dr. Cardenas recs appreciated  -On official read EEG does not report seizures but per discussion with Dr. Cardenas, patient has seizure tendency in the b/L frontal lobes  -c/w home Donepezil 5mg PO qd to help slow down cognitive decline  - Patient not tolerating po at this time, switched to Keppra 750mg IV BID as per neurology recommendations  -speech and swallow eval   -PT/OT recommends home PT  -Complete infectious and metabolic workup for potential triggers for breakthrough seizure  -f/u CT chest (CT abdomen shows mild mosaic groundglass attenuation in bilateral lower lobes and LLL nodule)  -UA negative, UCx negative, Utox positive for benzos as expected  -f/u blood cultures x 2  -seizure, aspiration, and fall precautions
- CT shows multifocal PNA  - possible aspiration PNA, mom endorses difficulty swallowing food   -Continue ceftriaxone and azithromycin  - speech and swallow recommends to continue soft and bite sized with thin liquids
Patient with CT chest showing multifocal PNA. Patient's mother reports Indu has a difficult time with solid foods and vomits after eating the solid food. Thus, there is a concern for gram-negative/aspiration PNA  -Continue ceftriaxone and azithromycin at this time given vast improvement in mentation and some improvement in BP with this regimen  -S/S evalution  -Change diet to soft and bite-sized

## 2022-10-24 NOTE — PROGRESS NOTE ADULT - SUBJECTIVE AND OBJECTIVE BOX
NP Note discussed with  Primary Attending    Patient is a 41y old  Female who presents with a chief complaint of seizure (23 Oct 2022 17:16)      INTERVAL HPI/OVERNIGHT EVENTS: no new complaints    MEDICATIONS  (STANDING):  ARIPiprazole 2 milliGRAM(s) Oral daily  azithromycin  IVPB      azithromycin  IVPB 500 milliGRAM(s) IV Intermittent every 24 hours  cefTRIAXone   IVPB 1000 milliGRAM(s) IV Intermittent every 24 hours  cefTRIAXone   IVPB      donepezil 5 milliGRAM(s) Oral at bedtime  enoxaparin Injectable 40 milliGRAM(s) SubCutaneous every 24 hours  ferrous    sulfate 325 milliGRAM(s) Oral daily  fluticasone propionate 50 MICROgram(s)/spray Nasal Spray 1 Spray(s) Both Nostrils two times a day  levETIRAcetam 750 milliGRAM(s) Oral two times a day  polyethylene glycol 3350 17 Gram(s) Oral daily  senna 2 Tablet(s) Oral at bedtime  traZODone 100 milliGRAM(s) Oral at bedtime    MEDICATIONS  (PRN):  acetaminophen     Tablet .. 650 milliGRAM(s) Oral every 6 hours PRN Temp greater or equal to 38C (100.4F), Mild Pain (1 - 3)  bisacodyl 5 milliGRAM(s) Oral every 12 hours PRN Constipation  hydrOXYzine hydrochloride 25 milliGRAM(s) Oral three times a day PRN Anxiety      __________________________________________________  REVIEW OF SYSTEMS:    CONSTITUTIONAL: No fever,   EYES: no acute visual disturbances  NECK: No pain or stiffness  RESPIRATORY: No cough; No shortness of breath  CARDIOVASCULAR: No chest pain, no palpitations  GASTROINTESTINAL: No pain. No nausea or vomiting; No diarrhea   NEUROLOGICAL: No headache or numbness, no tremors  MUSCULOSKELETAL: No joint pain, no muscle pain  GENITOURINARY: no dysuria, no frequency, no hesitancy  PSYCHIATRY: no depression , no anxiety  ALL OTHER  ROS negative        Vital Signs Last 24 Hrs  T(C): 36.6 (24 Oct 2022 14:06), Max: 36.6 (24 Oct 2022 05:21)  T(F): 97.8 (24 Oct 2022 14:06), Max: 97.9 (24 Oct 2022 05:21)  HR: 84 (24 Oct 2022 14:06) (62 - 84)  BP: 98/53 (24 Oct 2022 14:06) (98/53 - 112/62)  BP(mean): 65 (23 Oct 2022 22:20) (65 - 65)  RR: 17 (24 Oct 2022 14:06) (17 - 18)  SpO2: 97% (24 Oct 2022 14:06) (97% - 97%)    Parameters below as of 24 Oct 2022 14:06  Patient On (Oxygen Delivery Method): room air        ________________________________________________  PHYSICAL EXAM:  GENERAL: NAD  HEENT: Normocephalic;  conjunctivae and sclerae clear; moist mucous membranes;   NECK : supple  CHEST/LUNG: Clear to auscultation bilaterally with good air entry   HEART: S1 S2  regular; no murmurs, gallops or rubs  ABDOMEN: Soft, Nontender, Nondistended; Bowel sounds present  EXTREMITIES: no cyanosis; no edema; no calf tenderness  SKIN: warm and dry; no rash  NERVOUS SYSTEM:  Awake and alert; Oriented  to place, person and time ; no new deficits    _________________________________________________  LABS:                        10.6   4.55  )-----------( 164      ( 24 Oct 2022 07:55 )             31.3     10-24    143  |  109<H>  |  8   ----------------------------<  99  3.6   |  28  |  0.74    Ca    8.4      24 Oct 2022 07:55          CAPILLARY BLOOD GLUCOSE            RADIOLOGY & ADDITIONAL TESTS:    Imaging Personally Reviewed:  YES/NO    Consultant(s) Notes Reviewed:   YES/ No    Care Discussed with Consultants :     Plan of care was discussed with patient and /or primary care giver; all questions and concerns were addressed and care was aligned with patient's wishes.     NP Note discussed with  Primary Attending    Patient is a 41y old  Female who presents with a chief complaint of seizure (23 Oct 2022 17:16)      INTERVAL HPI/OVERNIGHT EVENTS: no acute events overnight    MEDICATIONS  (STANDING):  ARIPiprazole 2 milliGRAM(s) Oral daily  azithromycin  IVPB      azithromycin  IVPB 500 milliGRAM(s) IV Intermittent every 24 hours  cefTRIAXone   IVPB 1000 milliGRAM(s) IV Intermittent every 24 hours  cefTRIAXone   IVPB      donepezil 5 milliGRAM(s) Oral at bedtime  enoxaparin Injectable 40 milliGRAM(s) SubCutaneous every 24 hours  ferrous    sulfate 325 milliGRAM(s) Oral daily  fluticasone propionate 50 MICROgram(s)/spray Nasal Spray 1 Spray(s) Both Nostrils two times a day  levETIRAcetam 750 milliGRAM(s) Oral two times a day  polyethylene glycol 3350 17 Gram(s) Oral daily  senna 2 Tablet(s) Oral at bedtime  traZODone 100 milliGRAM(s) Oral at bedtime    MEDICATIONS  (PRN):  acetaminophen     Tablet .. 650 milliGRAM(s) Oral every 6 hours PRN Temp greater or equal to 38C (100.4F), Mild Pain (1 - 3)  bisacodyl 5 milliGRAM(s) Oral every 12 hours PRN Constipation  hydrOXYzine hydrochloride 25 milliGRAM(s) Oral three times a day PRN Anxiety      __________________________________________________  REVIEW OF SYSTEMS:    limited due to patient's mental status    Vital Signs Last 24 Hrs  T(C): 36.6 (24 Oct 2022 14:06), Max: 36.6 (24 Oct 2022 05:21)  T(F): 97.8 (24 Oct 2022 14:06), Max: 97.9 (24 Oct 2022 05:21)  HR: 84 (24 Oct 2022 14:06) (62 - 84)  BP: 98/53 (24 Oct 2022 14:06) (98/53 - 112/62)  BP(mean): 65 (23 Oct 2022 22:20) (65 - 65)  RR: 17 (24 Oct 2022 14:06) (17 - 18)  SpO2: 97% (24 Oct 2022 14:06) (97% - 97%)    Parameters below as of 24 Oct 2022 14:06  Patient On (Oxygen Delivery Method): room air        ________________________________________________  PHYSICAL EXAM:  GENERAL: NAD  HEENT: Normocephalic;  conjunctivae and sclerae clear  NECK : supple  CHEST/LUNG: Clear to auscultation bilaterally  HEART: S1 S2  RRR  ABDOMEN: Soft, Nontender, Nondistended; Bowel sounds present  EXTREMITIES: no cyanosis; no edema  SKIN: warm and dry; no rash  NERVOUS SYSTEM:  Awake and alert; Oriented to person    _________________________________________________  LABS:                        10.6   4.55  )-----------( 164      ( 24 Oct 2022 07:55 )             31.3     10-24    143  |  109<H>  |  8   ----------------------------<  99  3.6   |  28  |  0.74    Ca    8.4      24 Oct 2022 07:55          CAPILLARY BLOOD GLUCOSE            RADIOLOGY & ADDITIONAL TESTS:  < from: CT Head No Cont (10.21.22 @ 02:26) >  IMPRESSION: No intracranial hemorrhage or mass effect.  Involutional changes beyond expected for patient's age. Please correlate   with patient's clinical history.    < end of copied text >    < from: CT Abdomen and Pelvis w/ IV Cont (10.21.22 @ 02:59) >  IMPRESSION:    Abundant fecal material identified within the colonic loops extending to   mildly distended rectum with suggestion of wall thickening. Recommend   clinical correlation to assess for stercoral colitis in appropriate   clinical setting. No bowel obstruction.    Mild bladder wall thickening. Correlate with urinalysis and lab values to   assess for cystitis and/or ascending urinary tract infection.    Mild mosaic groundglass attenuation in bilateral lower lobes, difficult   to assess secondary to respiratory motion artifact. Sub-cm left lower   lobe nodular densities, incompletely characterized secondary to motion   artifact. Consider nonemergent pulmonary imaging follow-up.    < end of copied text >      < from: CT Chest No Cont (10.22.22 @ 18:31) >    IMPRESSION:  Multifocal pneumonia. Follow-up suggested to resolution.    < end of copied text >    Imaging Personally Reviewed:  YES    Consultant(s) Notes Reviewed:   YES      Plan of care was discussed with patient and /or primary care giver; all questions and concerns were addressed and care was aligned with patient's wishes.

## 2022-10-24 NOTE — SWALLOW BEDSIDE ASSESSMENT ADULT - ASR SWALLOW DENTITION
broken & decayed dentition throughout mouth. Mother states patient is non-compliant w/ oral hygiene./incomplete

## 2022-10-24 NOTE — PROGRESS NOTE ADULT - PROBLEM SELECTOR PLAN 3
DVT: lovenox subq
p/w abdominal tenderness on exam and recent history of diarrhea and constipation per mother  -CT abdomen: Abundant fecal material identified within the colonic loops extending to   mildly distended rectum with suggestion of wall thickening. Mild bladder wall thickening.  -c/w home bisacodyl 5mg PRN BID  -bowel regimen: miralax and senna  -Will give enemas PRN as patient is not currently taking much po  -Tylenol PRN q6 for mild pain  -UA negative, UCx negative  -bladder scan negative for retention, repeat UA negative for infection but urine culture growing 50,000-99,000 Pseudomonas. Given the patient's concurrent multifocal PNA, ceftriaxone will cover the Pseudomonas though doubt patient's symptoms are related to the urine and more likely related to the lungs
- p/w abdominal tenderness  - CT abdomen: Abundant fecal material identified within the colonic loops extending to   mildly distended rectum with suggestion of wall thickening. Mild bladder wall thickening.  -c/w home bisacodyl 5mg PRN BID  -continue bowel regimen  -Tylenol PRN q6 for mild pain  -bladder scan negative for retention, repeat UA negative for infection but urine culture growing 50,000-99,000 Pseudomonas  - continue Ceftriaxone

## 2022-10-24 NOTE — SWALLOW BEDSIDE ASSESSMENT ADULT - ORAL PREPARATORY PHASE
reduced pucker to cup presentation/Reduced oral grading/Anterior loss of bolus/Bolus falls into anterior sulcus Reduced oral grading/Decreased mastication ability/Bolus falls into anterior sulcus Reduced oral grading

## 2022-10-25 ENCOUNTER — TRANSCRIPTION ENCOUNTER (OUTPATIENT)
Age: 42
End: 2022-10-25

## 2022-10-25 VITALS — DIASTOLIC BLOOD PRESSURE: 44 MMHG | SYSTOLIC BLOOD PRESSURE: 116 MMHG | HEART RATE: 116 BPM

## 2022-10-25 LAB
ANION GAP SERPL CALC-SCNC: 7 MMOL/L — SIGNIFICANT CHANGE UP (ref 5–17)
BUN SERPL-MCNC: 7 MG/DL — SIGNIFICANT CHANGE UP (ref 7–18)
CALCIUM SERPL-MCNC: 8.5 MG/DL — SIGNIFICANT CHANGE UP (ref 8.4–10.5)
CHLORIDE SERPL-SCNC: 108 MMOL/L — SIGNIFICANT CHANGE UP (ref 96–108)
CO2 SERPL-SCNC: 29 MMOL/L — SIGNIFICANT CHANGE UP (ref 22–31)
CREAT SERPL-MCNC: 0.7 MG/DL — SIGNIFICANT CHANGE UP (ref 0.5–1.3)
EGFR: 111 ML/MIN/1.73M2 — SIGNIFICANT CHANGE UP
GLUCOSE SERPL-MCNC: 109 MG/DL — HIGH (ref 70–99)
HCT VFR BLD CALC: 32 % — LOW (ref 34.5–45)
HGB BLD-MCNC: 10.9 G/DL — LOW (ref 11.5–15.5)
MCHC RBC-ENTMCNC: 32.9 PG — SIGNIFICANT CHANGE UP (ref 27–34)
MCHC RBC-ENTMCNC: 34.1 GM/DL — SIGNIFICANT CHANGE UP (ref 32–36)
MCV RBC AUTO: 96.7 FL — SIGNIFICANT CHANGE UP (ref 80–100)
NRBC # BLD: 0 /100 WBCS — SIGNIFICANT CHANGE UP (ref 0–0)
PLATELET # BLD AUTO: 223 K/UL — SIGNIFICANT CHANGE UP (ref 150–400)
POTASSIUM SERPL-MCNC: 3.7 MMOL/L — SIGNIFICANT CHANGE UP (ref 3.5–5.3)
POTASSIUM SERPL-SCNC: 3.7 MMOL/L — SIGNIFICANT CHANGE UP (ref 3.5–5.3)
RBC # BLD: 3.31 M/UL — LOW (ref 3.8–5.2)
RBC # FLD: 13.3 % — SIGNIFICANT CHANGE UP (ref 10.3–14.5)
SODIUM SERPL-SCNC: 144 MMOL/L — SIGNIFICANT CHANGE UP (ref 135–145)
WBC # BLD: 5.09 K/UL — SIGNIFICANT CHANGE UP (ref 3.8–10.5)
WBC # FLD AUTO: 5.09 K/UL — SIGNIFICANT CHANGE UP (ref 3.8–10.5)

## 2022-10-25 PROCEDURE — 99239 HOSP IP/OBS DSCHRG MGMT >30: CPT

## 2022-10-25 RX ORDER — CEFPODOXIME PROXETIL 100 MG
1 TABLET ORAL
Qty: 4 | Refills: 0
Start: 2022-10-25 | End: 2022-10-26

## 2022-10-25 RX ORDER — POLYETHYLENE GLYCOL 3350 17 G/17G
17 POWDER, FOR SOLUTION ORAL
Qty: 510 | Refills: 0
Start: 2022-10-25 | End: 2022-11-23

## 2022-10-25 RX ORDER — SENNA PLUS 8.6 MG/1
2 TABLET ORAL
Qty: 60 | Refills: 0
Start: 2022-10-25 | End: 2022-11-23

## 2022-10-25 RX ORDER — LEVETIRACETAM 250 MG/1
1 TABLET, FILM COATED ORAL
Qty: 60 | Refills: 0
Start: 2022-10-25 | End: 2022-11-23

## 2022-10-25 RX ADMIN — Medication 1 SPRAY(S): at 17:53

## 2022-10-25 RX ADMIN — Medication 1 SPRAY(S): at 05:26

## 2022-10-25 RX ADMIN — ARIPIPRAZOLE 2 MILLIGRAM(S): 15 TABLET ORAL at 11:41

## 2022-10-25 RX ADMIN — POLYETHYLENE GLYCOL 3350 17 GRAM(S): 17 POWDER, FOR SOLUTION ORAL at 11:43

## 2022-10-25 RX ADMIN — Medication 325 MILLIGRAM(S): at 11:42

## 2022-10-25 RX ADMIN — LEVETIRACETAM 750 MILLIGRAM(S): 250 TABLET, FILM COATED ORAL at 17:51

## 2022-10-25 RX ADMIN — ENOXAPARIN SODIUM 40 MILLIGRAM(S): 100 INJECTION SUBCUTANEOUS at 11:41

## 2022-10-25 RX ADMIN — LEVETIRACETAM 750 MILLIGRAM(S): 250 TABLET, FILM COATED ORAL at 05:26

## 2022-10-25 NOTE — DISCHARGE NOTE NURSING/CASE MANAGEMENT/SOCIAL WORK - PATIENT PORTAL LINK FT
You can access the FollowMyHealth Patient Portal offered by [facility] by registering at the following website: [email or web address]. By joining [employer]’s FollowMyHealth portal, you will also be able to view your health information using other applications (apps) compatible with our system.

## 2022-10-25 NOTE — DISCHARGE NOTE NURSING/CASE MANAGEMENT/SOCIAL WORK - NSDCPEFALRISK_GEN_ALL_CORE
For information on Fall & Injury Prevention, visit: [email or web address] OR  [email or web address] OR  https://www.cdc.gov/steadi/patient.html

## 2022-11-08 PROCEDURE — 87635 SARS-COV-2 COVID-19 AMP PRB: CPT

## 2022-11-08 PROCEDURE — 92610 EVALUATE SWALLOWING FUNCTION: CPT

## 2022-11-08 PROCEDURE — 99285 EMERGENCY DEPT VISIT HI MDM: CPT | Mod: 25

## 2022-11-08 PROCEDURE — 80048 BASIC METABOLIC PNL TOTAL CA: CPT

## 2022-11-08 PROCEDURE — 87077 CULTURE AEROBIC IDENTIFY: CPT

## 2022-11-08 PROCEDURE — 83735 ASSAY OF MAGNESIUM: CPT

## 2022-11-08 PROCEDURE — 96375 TX/PRO/DX INJ NEW DRUG ADDON: CPT

## 2022-11-08 PROCEDURE — 71045 X-RAY EXAM CHEST 1 VIEW: CPT

## 2022-11-08 PROCEDURE — 95957 EEG DIGITAL ANALYSIS: CPT

## 2022-11-08 PROCEDURE — 87086 URINE CULTURE/COLONY COUNT: CPT

## 2022-11-08 PROCEDURE — 96372 THER/PROPH/DIAG INJ SC/IM: CPT | Mod: XU

## 2022-11-08 PROCEDURE — 36415 COLL VENOUS BLD VENIPUNCTURE: CPT

## 2022-11-08 PROCEDURE — 94640 AIRWAY INHALATION TREATMENT: CPT

## 2022-11-08 PROCEDURE — 80164 ASSAY DIPROPYLACETIC ACD TOT: CPT

## 2022-11-08 PROCEDURE — 97162 PT EVAL MOD COMPLEX 30 MIN: CPT

## 2022-11-08 PROCEDURE — 96374 THER/PROPH/DIAG INJ IV PUSH: CPT

## 2022-11-08 PROCEDURE — 80307 DRUG TEST PRSMV CHEM ANLYZR: CPT

## 2022-11-08 PROCEDURE — 85025 COMPLETE CBC W/AUTO DIFF WBC: CPT

## 2022-11-08 PROCEDURE — 71250 CT THORAX DX C-: CPT

## 2022-11-08 PROCEDURE — 87186 SC STD MICRODIL/AGAR DIL: CPT

## 2022-11-08 PROCEDURE — 84702 CHORIONIC GONADOTROPIN TEST: CPT

## 2022-11-08 PROCEDURE — 82962 GLUCOSE BLOOD TEST: CPT

## 2022-11-08 PROCEDURE — 96376 TX/PRO/DX INJ SAME DRUG ADON: CPT

## 2022-11-08 PROCEDURE — 81001 URINALYSIS AUTO W/SCOPE: CPT

## 2022-11-08 PROCEDURE — 84100 ASSAY OF PHOSPHORUS: CPT

## 2022-11-08 PROCEDURE — 70450 CT HEAD/BRAIN W/O DYE: CPT | Mod: QQ

## 2022-11-08 PROCEDURE — 95819 EEG AWAKE AND ASLEEP: CPT

## 2022-11-08 PROCEDURE — 87040 BLOOD CULTURE FOR BACTERIA: CPT

## 2022-11-08 PROCEDURE — 74177 CT ABD & PELVIS W/CONTRAST: CPT | Mod: MA

## 2022-11-08 PROCEDURE — 85027 COMPLETE CBC AUTOMATED: CPT

## 2022-11-08 PROCEDURE — 80053 COMPREHEN METABOLIC PANEL: CPT

## 2022-11-08 PROCEDURE — 81003 URINALYSIS AUTO W/O SCOPE: CPT

## 2022-11-18 ENCOUNTER — APPOINTMENT (OUTPATIENT)
Dept: NEUROLOGY | Facility: CLINIC | Age: 42
End: 2022-11-18

## 2022-11-18 VITALS — DIASTOLIC BLOOD PRESSURE: 82 MMHG | HEART RATE: 78 BPM | SYSTOLIC BLOOD PRESSURE: 122 MMHG

## 2022-11-18 DIAGNOSIS — G40.309 GENERALIZED IDIOPATHIC EPILEPSY AND EPILEPTIC SYNDROMES, NOT INTRACTABLE, W/OUT STATUS EPILEPTICUS: ICD-10-CM

## 2022-11-18 DIAGNOSIS — G30.9 ALZHEIMER'S DISEASE, UNSPECIFIED: ICD-10-CM

## 2022-11-18 DIAGNOSIS — F02.818 ALZHEIMER'S DISEASE, UNSPECIFIED: ICD-10-CM

## 2022-11-18 PROCEDURE — 99205 OFFICE O/P NEW HI 60 MIN: CPT

## 2022-11-18 NOTE — HISTORY OF PRESENT ILLNESS
[FreeTextEntry1] : 41F with PMH of Down Syndrome, progressive dementia, cataracts, and epilepsy BIBEMS x2 to [facility] in OCT 2022 for seizures. (seen with fa/michelle )\par Last time after witnessed 20-30 minute tonic clonic seizure. Seen by Neurology Dr. Cardenas\par EEG showed L>R frontal spike waves, frequent.\par Was on depakote from prior [facility] admission 9/2022, changed to Keppra at [facility] 10/2022\par Hospital course c/b multifocal PNA, ileus, UTI UCX 50-90K Pseudomonas\par \par Since then on LEV 750mg bid\par Tolerated.  Behavior: slight improvement lately per dad on aricept\par Tends to grab at things, fussy at times per dad.\par h/o fall 4 yrs ago, progressive decrease in speaking, no longer in school/program, not talking, not walking outdoors, walks with assist only short distance.  Dependent ADLs x few years.  Progressive decline.\par \par No further overt seizure since.\par \par FmHx: NC\par SocHx: mom involved\par \par ARIPIPRAZOLE 2MG TABLETS: 1 each orally once a day \par DONEPEZIL 5MG TABLETS: 1 each orally once a day \par FERROUS SULFATE 325MG (5GR) TABS: 1 each orally once a day \par HYDROXYZINE HCL 25MG TABS (WHITE): 1 each orally 3 times a day, As Needed \par levETIRAcetam 750 mg oral tablet, dispersible: 1 tab(s) orally every 12 hours \par MULTIVITAMIN TABLETS: 1 each orally once a day \par TRAZODONE 100MG TABLETS: 1 each orally once a day (at bedtime) \par  \par

## 2022-11-18 NOTE — DISCUSSION/SUMMARY
[Medically Refractory (seizure within the last year)] : Medically Refractory (seizure within the last year) [Secondary Generalization] : secondary generalization [Focal] : focal [Symptomatic] : symptomatic [Risks Associated with Driving/[place] Law] : As per my usual protocol, the patient was advised in regards to risks and driving privileges associated with the [place] Guidelines.  [Safety Recommendations] : The patient was advised in regards to the risk of seizures and general seizure safety recommendations including not to be bathing alone, climbing to high places and operating heavy machinery. [Compliance with Medications] : The importance of compliance with medications was reinforced. [Medication Side Effects] : High frequency and serious potential medication adverse effects were reviewed with the patient, including but not exclusive to psychiatric effects.  Information sheets on medication side effects were made available to the patient in our clinic.  The patient or advocate agrees to notify us for any concerns. [Sleep Hygiene/Sleep Disruption Risks] : Sleep hygiene and the risks of sleep disruption were discussed. [Risk of Death] : Risk of death associated with seizures / SUDEP was discussed. [FreeTextEntry1] : Ms. BRENDA RUSSO is a 41 year old F under evaluation for seizure disorder: \par Differential diagnosis/localization: seizures secondary\par Risk factors:Downs, progressive dementia \par --Other issues: sleep, behavioral issues baseline prior to use of LEV\par \par Plan:\par Current recommendations are to proceed with:\par -LEV 750mg bid. Moderate dose.  Titrate as needed\par -cont behavioral meds, aricept for now\par -sz precautions\par -trazadone 100mg qhs\par -increase abilify to 4mg, sundowning, agitation an issue.  consider change to seroquel\par -avoid daytime napping\par \par Education provided regarding clinical diagnosis and plan.  Patient was given the opportunity to ask further questions in regards to their care.\par x 60min\par RTC 3-4mo\par

## 2022-11-18 NOTE — PHYSICAL EXAM
[FreeTextEntry1] : General: down's facies\par Constitutional: alert and in no acute distress. \par Psychiatric: calm, makes noises, not talking\par Neurologic: \par Orientation: not\par Attention: distracted \par Language: not speaking/talking, makes grunts\par Cranial Nerves: visual acuity intact bilaterally, visual fields full to confrontation, pupils equal round and reactive to light, extraocular motion intact, facial sensation intact symmetrically, face symmetrical, hearing was intact bilaterally, tongue and palate midline, head turning and shoulder shrug symmetric and there was no tongue deviation with protrusion. \par Motor: muscle tone was increased in all four extremities, normal bulk in all four extremities. \par Coordination: WC, no tremor\par Deep tendon reflexes: \par Biceps right 2+. Biceps left 2+.  \par Triceps right 2+. Triceps left 2+.  \par Brachioradialis right 2+. Brachioradialis left 2+.  \par Patella right 2+. Patella left 2+.  \par Ankle jerk right 2+. Ankle jerk left 2+.\par +Grasp bilaterally.  \par Eyes: the sclera and conjunctiva were normal. \par Neck: the appearance of the neck was normal. \par Musculoskeletal: no clubbing or cyanosis of the fingernails. \par Skin: no lesions, rash\par

## 2023-02-10 ENCOUNTER — APPOINTMENT (OUTPATIENT)
Dept: NEUROLOGY | Facility: CLINIC | Age: 43
End: 2023-02-10

## 2023-03-24 ENCOUNTER — APPOINTMENT (OUTPATIENT)
Dept: NEUROLOGY | Facility: CLINIC | Age: 43
End: 2023-03-24
Payer: COMMERCIAL

## 2023-03-24 VITALS — HEIGHT: 58 IN | SYSTOLIC BLOOD PRESSURE: 93 MMHG | DIASTOLIC BLOOD PRESSURE: 58 MMHG | HEART RATE: 56 BPM

## 2023-03-24 VITALS — BODY MASS INDEX: 22.99 KG/M2 | WEIGHT: 110 LBS

## 2023-03-24 PROCEDURE — 99213 OFFICE O/P EST LOW 20 MIN: CPT

## 2023-03-24 NOTE — DISCUSSION/SUMMARY
[Medically Refractory (seizure within the last year)] : Medically Refractory (seizure within the last year) [Secondary Generalization] : secondary generalization [Focal] : focal [Symptomatic] : symptomatic [[place]isks Associated with Driving/[place] Law] : As per my usual protocol, the patient was advised in regards to risks and driving privileges associated with the [place] Guidelines.  [Safety Recommendations] : The patient was advised in regards to the risk of seizures and general seizure safety recommendations including not to be bathing alone, climbing to high places and operating heavy machinery. [Compliance with Medications] : The importance of compliance with medications was reinforced. [Medication Side Effects] : High frequency and serious potential medication adverse effects were reviewed with the patient, including but not exclusive to psychiatric effects.  Information sheets on medication side effects were made available to the patient in our clinic.  The patient or advocate agrees to notify us for any concerns. [Sleep Hygiene/Sleep Disruption Risks] : Sleep hygiene and the risks of sleep disruption were discussed. [Risk of Death] : Risk of death associated with seizures / SUDEP was discussed. [FreeTextEntry1] : 41 year old F under evaluation for seizure disorder: \par Differential diagnosis/localization: seizures secondary\par Risk factors:Downs, progressive dementia \par - Other issues: sleep, behavioral issues baseline prior to use of LEV\par - Concern for functional decline associated with dementia of early onset in AD, no longer speaking x 3 yrs, not walking well x 1yr.\par \par Plan:\par Current recommendations are to proceed with:\par -LEV 750mg bid. Moderate dose.  Cont for now\par -sz precautions\par -cont behavioral meds, aricept for now\par -trazadone 100mg qhs\par -abilify to 4mg, sundowning, agitation an issue.  consider change to seroquel\par -avoid daytime napping\par \par -Concern for constipation per aide, straining, long use of BR each time.  Rec bowel regimen per PMD\par \par Education provided regarding clinical diagnosis and plan.  Patient was given the opportunity to ask further questions in regards to their care.\par \par x21min\par RTC 4-6mo\par

## 2023-03-24 NOTE — HISTORY OF PRESENT ILLNESS
[FreeTextEntry1] : \par Update: leaning more, while walking per aide. Less use of BR on her own.  Taking a lot of time in BR.  Concern for constipation.\par \par \par HPI: 42F with PMH of Down Syndrome, progressive dementia, cataracts, and epilepsy [facility]. (seen with fa/michelle )\par Last time after witnessed 20-30 minute tonic clonic seizure. Seen by Neurology Dr. Cardenas\par EEG showed L>R frontal spike waves, frequent.\par Was on depakote from prior [facility] admission 9/2022, [facility]\par Hospital course c/b multifocal PNA, ileus, UTI UCX 50-90K Pseudomonas\par \par Since then on LEV 750mg bid\par Tolerated.  Behavior: slight improvement lately per dad on aricept\par Tends to grab at things, fussy at times per dad.\par h/o fall 4 yrs ago, progressive decrease in speaking, no longer in school/program, not talking, not walking outdoors, walks with assist only short distance.  Dependent ADLs x few years.  Progressive decline.\par \par No further overt seizure since.\par \par FmHx: NC\par SocHx: mom involved\par \par ARIPIPRAZOLE 2MG TABLETS: 1 each orally once a day \par DONEPEZIL 5MG TABLETS: 1 each orally once a day \par FERROUS SULFATE 325MG (5GR) TABS: 1 each orally once a day \par HYDROXYZINE HCL 25MG TABS (WHITE): 1 each orally 3 times a day, As Needed \par levETIRAcetam 750 mg oral tablet, dispersible: 1 tab(s) orally every 12 hours \par MULTIVITAMIN TABLETS: 1 each orally once a day \par TRAZODONE 100MG TABLETS: 1 each orally once a day (at bedtime) \par  \par

## 2023-03-24 NOTE — PHYSICAL EXAM
[FreeTextEntry1] : General: down's facies\par Constitutional: alert and in no acute distress. \par Psychiatric: calm, makes noises, not talking\par Neurologic: \par Orientation: not\par Attention: distracted \par Language: not speaking/talking, makes grunts\par Cranial Nerves: visual acuity intact bilaterally, visual fields full to confrontation, pupils equal round and reactive to light, extraocular motion intact, facial sensation intact symmetrically, face symmetrical, hearing was intact bilaterally, tongue and palate midline, head turning and shoulder shrug symmetric and there was no tongue deviation with protrusion. \par Motor: muscle tone was increased in all four extremities, normal bulk in all four extremities. \par Coordination: WC, stands with assistance, apraxic gait, leans to left, no tremor\par Deep tendon reflexes: \par Biceps right 2+. Biceps left 2+.  \par Triceps right 2+. Triceps left 2+.  \par Brachioradialis right 2+. Brachioradialis left 2+.  \par Patella right 2+. Patella left 2+.  \par Ankle jerk right 2+. Ankle jerk left 2+.\par +Grasp bilaterally.  \par Eyes: the sclera and conjunctiva were normal. \par Neck: the appearance of the neck was normal. \par Musculoskeletal: no clubbing or cyanosis of the fingernails. \par Skin: no lesions, rash\par

## 2023-03-30 ENCOUNTER — OUTPATIENT (OUTPATIENT)
Dept: OUTPATIENT SERVICES | Facility: HOSPITAL | Age: 43
LOS: 1 days | End: 2023-03-30
Payer: COMMERCIAL

## 2023-03-30 ENCOUNTER — APPOINTMENT (OUTPATIENT)
Dept: CT IMAGING | Facility: CLINIC | Age: 43
End: 2023-03-30
Payer: COMMERCIAL

## 2023-03-30 ENCOUNTER — APPOINTMENT (OUTPATIENT)
Dept: CT IMAGING | Facility: CLINIC | Age: 43
End: 2023-03-30

## 2023-03-30 DIAGNOSIS — R26.9 UNSPECIFIED ABNORMALITIES OF GAIT AND MOBILITY: ICD-10-CM

## 2023-03-30 DIAGNOSIS — F09 UNSPECIFIED MENTAL DISORDER DUE TO KNOWN PHYSIOLOGICAL CONDITION: ICD-10-CM

## 2023-03-30 DIAGNOSIS — Z98.51 TUBAL LIGATION STATUS: Chronic | ICD-10-CM

## 2023-03-30 DIAGNOSIS — G40.909 EPILEPSY, UNSPECIFIED, NOT INTRACTABLE, WITHOUT STATUS EPILEPTICUS: ICD-10-CM

## 2023-03-30 PROCEDURE — 72125 CT NECK SPINE W/O DYE: CPT

## 2023-03-30 PROCEDURE — 70450 CT HEAD/BRAIN W/O DYE: CPT

## 2023-03-30 PROCEDURE — 72125 CT NECK SPINE W/O DYE: CPT | Mod: 26

## 2023-03-30 PROCEDURE — 70450 CT HEAD/BRAIN W/O DYE: CPT | Mod: 26

## 2023-03-30 RX ORDER — TRAZODONE HCL 50 MG
0 TABLET ORAL
Qty: 0 | Refills: 1 | DISCHARGE

## 2023-03-30 RX ORDER — FERROUS SULFATE 325(65) MG
0 TABLET ORAL
Qty: 0 | Refills: 0 | DISCHARGE

## 2023-03-30 RX ORDER — AZELASTINE HCL 0.05 %
0 DROPS OPHTHALMIC (EYE)
Qty: 0 | Refills: 3 | DISCHARGE

## 2023-03-30 RX ORDER — ARIPIPRAZOLE 15 MG/1
0 TABLET ORAL
Qty: 0 | Refills: 1 | DISCHARGE

## 2023-03-30 RX ORDER — TRAZODONE HCL 50 MG
1 TABLET ORAL
Qty: 0 | Refills: 1 | DISCHARGE

## 2023-03-30 RX ORDER — HYDROXYZINE HCL 10 MG
0 TABLET ORAL
Qty: 0 | Refills: 2 | DISCHARGE

## 2023-03-30 RX ORDER — VALPROIC ACID (AS SODIUM SALT) 250 MG/5ML
0 SOLUTION, ORAL ORAL
Qty: 0 | Refills: 2 | DISCHARGE

## 2023-03-30 RX ORDER — DONEPEZIL HYDROCHLORIDE 10 MG/1
0 TABLET, FILM COATED ORAL
Qty: 0 | Refills: 6 | DISCHARGE

## 2023-03-30 RX ORDER — FERROUS SULFATE 325(65) MG
1 TABLET ORAL
Qty: 0 | Refills: 0 | DISCHARGE

## 2023-03-30 RX ORDER — FLUTICASONE PROPIONATE 50 MCG
0 SPRAY, SUSPENSION NASAL
Qty: 0 | Refills: 0 | DISCHARGE

## 2023-07-10 NOTE — PATIENT PROFILE ADULT - FALL HARM RISK - ATTEMPT OOB
Gilford Lava is a 61 y.o. female evaluated via telephone on 7/10/2023 for Diarrhea (And vomiting onset 4 days./Lack of appetite/cant keep food down/Has been able to keep some water down./Bad headache at onset of symptoms, headache has gotten better for the most part.)  . Documentation:  I communicated with the patient and/or health care decision maker about Vomiting and Diarrhea. Details of this discussion including any medical advice provided:     Started with terrible headache, the progressed to vomiting and then progressed to diarrhea. Headache is now not near as bad,   Diarrhea persistent. Watery and somewhat loose. Vomiting improving, twice last night. Last day or two able to keep some water - 3 bottles in past day. No food. Stomach hurting but same as normal.   No blood in stool or vomit. Has bene able to drink a little beer, majorly decreased from normal. Is feeling a little shaky today. Took some imodium today  Tagamet last night, GERD worse with vomiting. No fevers. + sweats first two nights. Pulse 118 this morning. Tends to run on the higher side around 100       A/P  Suspect viral gastro with vomiting and diarrhea and sweats the first couple days. Alcoholism. OK for imodium daily. ADD zofran for nausea and appetite. Hydration is main concern right now. Try to get 5 bottles of water today. Try to get some gentle soup in. Chicken noodle type. If able to drink a beer or two and shakes improve, that's fine as well. If unable to hydrate or shakiness worsens over next 24 hours, then to the ED is best place for care and medically necessary. Total Time: minutes: 11-20 minutes    Gilford Lava was evaluated through a synchronous (real-time) audio encounter. Patient identification was verified at the start of the visit. She (or guardian if applicable) is aware that this is a billable service, which includes applicable co-pays.  This visit was conducted
No

## 2023-07-11 ENCOUNTER — RX RENEWAL (OUTPATIENT)
Age: 43
End: 2023-07-11

## 2023-07-28 ENCOUNTER — NON-APPOINTMENT (OUTPATIENT)
Age: 43
End: 2023-07-28

## 2023-09-22 ENCOUNTER — APPOINTMENT (OUTPATIENT)
Dept: NEUROLOGY | Facility: CLINIC | Age: 43
End: 2023-09-22

## 2023-09-22 PROBLEM — H26.9 UNSPECIFIED CATARACT: Chronic | Status: ACTIVE | Noted: 2022-10-21

## 2023-09-22 PROBLEM — G40.909 EPILEPSY, UNSPECIFIED, NOT INTRACTABLE, WITHOUT STATUS EPILEPTICUS: Chronic | Status: ACTIVE | Noted: 2022-10-21

## 2023-09-22 PROBLEM — Q90.9 DOWN SYNDROME, UNSPECIFIED: Chronic | Status: ACTIVE | Noted: 2022-10-09

## 2023-09-27 ENCOUNTER — APPOINTMENT (OUTPATIENT)
Dept: NEUROLOGY | Facility: CLINIC | Age: 43
End: 2023-09-27
Payer: COMMERCIAL

## 2023-09-27 DIAGNOSIS — Z51.81 ENCOUNTER FOR THERAPEUTIC DRUG LVL MONITORING: ICD-10-CM

## 2023-09-27 PROCEDURE — 99213 OFFICE O/P EST LOW 20 MIN: CPT

## 2023-09-27 RX ORDER — ARIPIPRAZOLE 2 MG/1
2 TABLET ORAL TWICE DAILY
Qty: 180 | Refills: 1 | Status: DISCONTINUED | COMMUNITY
Start: 2023-07-11 | End: 2023-09-27

## 2023-10-18 LAB
ALBUMIN SERPL ELPH-MCNC: 4.1 G/DL
ALP BLD-CCNC: 76 U/L
ALT SERPL-CCNC: 22 U/L
ANION GAP SERPL CALC-SCNC: 13 MMOL/L
AST SERPL-CCNC: 28 U/L
BILIRUB DIRECT SERPL-MCNC: 0.1 MG/DL
BILIRUB INDIRECT SERPL-MCNC: 0.2 MG/DL
BILIRUB SERPL-MCNC: 0.4 MG/DL
BUN SERPL-MCNC: 11 MG/DL
CALCIUM SERPL-MCNC: 9.2 MG/DL
CHLORIDE SERPL-SCNC: 104 MMOL/L
CO2 SERPL-SCNC: 27 MMOL/L
CREAT SERPL-MCNC: 0.86 MG/DL
EGFR: 86 ML/MIN/1.73M2
GLUCOSE SERPL-MCNC: 107 MG/DL
HCT VFR BLD CALC: 42.7 %
HGB BLD-MCNC: 13.7 G/DL
MCHC RBC-ENTMCNC: 32.1 GM/DL
MCHC RBC-ENTMCNC: 34.2 PG
MCV RBC AUTO: 106.5 FL
PLATELET # BLD AUTO: 224 K/UL
POTASSIUM SERPL-SCNC: 4.4 MMOL/L
PROT SERPL-MCNC: 7.5 G/DL
RBC # BLD: 4.01 M/UL
RBC # FLD: 13.5 %
SODIUM SERPL-SCNC: 144 MMOL/L
WBC # FLD AUTO: 3.92 K/UL

## 2023-10-19 RX ORDER — QUETIAPINE FUMARATE 50 MG/1
50 TABLET ORAL
Qty: 90 | Refills: 1 | Status: DISCONTINUED | COMMUNITY
Start: 2023-09-27 | End: 2023-10-19

## 2023-10-20 LAB — LEVETIRACETAM SERPL-MCNC: 60.5 UG/ML

## 2023-12-11 DIAGNOSIS — R45.1 RESTLESSNESS AND AGITATION: ICD-10-CM

## 2023-12-11 RX ORDER — DONEPEZIL HYDROCHLORIDE 5 MG/1
5 TABLET ORAL DAILY
Qty: 90 | Refills: 1 | Status: DISCONTINUED | COMMUNITY
End: 2023-12-11

## 2023-12-18 ENCOUNTER — EMERGENCY (EMERGENCY)
Facility: HOSPITAL | Age: 43
LOS: 1 days | Discharge: HOME | End: 2023-12-18
Attending: EMERGENCY MEDICINE
Payer: MEDICARE

## 2023-12-18 VITALS
HEART RATE: 76 BPM | RESPIRATION RATE: 16 BRPM | SYSTOLIC BLOOD PRESSURE: 117 MMHG | OXYGEN SATURATION: 96 % | DIASTOLIC BLOOD PRESSURE: 76 MMHG

## 2023-12-18 DIAGNOSIS — Z98.51 TUBAL LIGATION STATUS: Chronic | ICD-10-CM

## 2023-12-18 LAB
ALBUMIN SERPL ELPH-MCNC: 3.7 G/DL — SIGNIFICANT CHANGE UP (ref 3.5–5)
ALBUMIN SERPL ELPH-MCNC: 3.7 G/DL — SIGNIFICANT CHANGE UP (ref 3.5–5)
ALP SERPL-CCNC: 82 U/L — SIGNIFICANT CHANGE UP (ref 40–120)
ALP SERPL-CCNC: 82 U/L — SIGNIFICANT CHANGE UP (ref 40–120)
ALT FLD-CCNC: 38 U/L DA — SIGNIFICANT CHANGE UP (ref 10–60)
ALT FLD-CCNC: 38 U/L DA — SIGNIFICANT CHANGE UP (ref 10–60)
ANION GAP SERPL CALC-SCNC: 5 MMOL/L — SIGNIFICANT CHANGE UP (ref 5–17)
ANION GAP SERPL CALC-SCNC: 5 MMOL/L — SIGNIFICANT CHANGE UP (ref 5–17)
AST SERPL-CCNC: 30 U/L — SIGNIFICANT CHANGE UP (ref 10–40)
AST SERPL-CCNC: 30 U/L — SIGNIFICANT CHANGE UP (ref 10–40)
BASOPHILS # BLD AUTO: 0.04 K/UL — SIGNIFICANT CHANGE UP (ref 0–0.2)
BASOPHILS # BLD AUTO: 0.04 K/UL — SIGNIFICANT CHANGE UP (ref 0–0.2)
BASOPHILS NFR BLD AUTO: 0.4 % — SIGNIFICANT CHANGE UP (ref 0–2)
BASOPHILS NFR BLD AUTO: 0.4 % — SIGNIFICANT CHANGE UP (ref 0–2)
BILIRUB SERPL-MCNC: 0.4 MG/DL — SIGNIFICANT CHANGE UP (ref 0.2–1.2)
BILIRUB SERPL-MCNC: 0.4 MG/DL — SIGNIFICANT CHANGE UP (ref 0.2–1.2)
BUN SERPL-MCNC: 20 MG/DL — HIGH (ref 7–18)
BUN SERPL-MCNC: 20 MG/DL — HIGH (ref 7–18)
CALCIUM SERPL-MCNC: 9.1 MG/DL — SIGNIFICANT CHANGE UP (ref 8.4–10.5)
CALCIUM SERPL-MCNC: 9.1 MG/DL — SIGNIFICANT CHANGE UP (ref 8.4–10.5)
CHLORIDE SERPL-SCNC: 104 MMOL/L — SIGNIFICANT CHANGE UP (ref 96–108)
CHLORIDE SERPL-SCNC: 104 MMOL/L — SIGNIFICANT CHANGE UP (ref 96–108)
CO2 SERPL-SCNC: 29 MMOL/L — SIGNIFICANT CHANGE UP (ref 22–31)
CO2 SERPL-SCNC: 29 MMOL/L — SIGNIFICANT CHANGE UP (ref 22–31)
CREAT SERPL-MCNC: 1.03 MG/DL — SIGNIFICANT CHANGE UP (ref 0.5–1.3)
CREAT SERPL-MCNC: 1.03 MG/DL — SIGNIFICANT CHANGE UP (ref 0.5–1.3)
EGFR: 69 ML/MIN/1.73M2 — SIGNIFICANT CHANGE UP
EGFR: 69 ML/MIN/1.73M2 — SIGNIFICANT CHANGE UP
EOSINOPHIL # BLD AUTO: 0.01 K/UL — SIGNIFICANT CHANGE UP (ref 0–0.5)
EOSINOPHIL # BLD AUTO: 0.01 K/UL — SIGNIFICANT CHANGE UP (ref 0–0.5)
EOSINOPHIL NFR BLD AUTO: 0.1 % — SIGNIFICANT CHANGE UP (ref 0–6)
EOSINOPHIL NFR BLD AUTO: 0.1 % — SIGNIFICANT CHANGE UP (ref 0–6)
FLUAV AG NPH QL: SIGNIFICANT CHANGE UP
FLUAV AG NPH QL: SIGNIFICANT CHANGE UP
FLUBV AG NPH QL: SIGNIFICANT CHANGE UP
FLUBV AG NPH QL: SIGNIFICANT CHANGE UP
GLUCOSE SERPL-MCNC: 116 MG/DL — HIGH (ref 70–99)
GLUCOSE SERPL-MCNC: 116 MG/DL — HIGH (ref 70–99)
HCG SERPL-ACNC: 2 MIU/ML — SIGNIFICANT CHANGE UP
HCG SERPL-ACNC: 2 MIU/ML — SIGNIFICANT CHANGE UP
HCT VFR BLD CALC: 43.3 % — SIGNIFICANT CHANGE UP (ref 34.5–45)
HCT VFR BLD CALC: 43.3 % — SIGNIFICANT CHANGE UP (ref 34.5–45)
HGB BLD-MCNC: 14.9 G/DL — SIGNIFICANT CHANGE UP (ref 11.5–15.5)
HGB BLD-MCNC: 14.9 G/DL — SIGNIFICANT CHANGE UP (ref 11.5–15.5)
IMM GRANULOCYTES NFR BLD AUTO: 0.4 % — SIGNIFICANT CHANGE UP (ref 0–0.9)
IMM GRANULOCYTES NFR BLD AUTO: 0.4 % — SIGNIFICANT CHANGE UP (ref 0–0.9)
LACTATE SERPL-SCNC: 1.1 MMOL/L — SIGNIFICANT CHANGE UP (ref 0.7–2)
LACTATE SERPL-SCNC: 1.1 MMOL/L — SIGNIFICANT CHANGE UP (ref 0.7–2)
LIDOCAIN IGE QN: 37 U/L — SIGNIFICANT CHANGE UP (ref 13–75)
LIDOCAIN IGE QN: 37 U/L — SIGNIFICANT CHANGE UP (ref 13–75)
LYMPHOCYTES # BLD AUTO: 1.96 K/UL — SIGNIFICANT CHANGE UP (ref 1–3.3)
LYMPHOCYTES # BLD AUTO: 1.96 K/UL — SIGNIFICANT CHANGE UP (ref 1–3.3)
LYMPHOCYTES # BLD AUTO: 18.5 % — SIGNIFICANT CHANGE UP (ref 13–44)
LYMPHOCYTES # BLD AUTO: 18.5 % — SIGNIFICANT CHANGE UP (ref 13–44)
MAGNESIUM SERPL-MCNC: 2.1 MG/DL — SIGNIFICANT CHANGE UP (ref 1.6–2.6)
MAGNESIUM SERPL-MCNC: 2.1 MG/DL — SIGNIFICANT CHANGE UP (ref 1.6–2.6)
MCHC RBC-ENTMCNC: 34.4 GM/DL — SIGNIFICANT CHANGE UP (ref 32–36)
MCHC RBC-ENTMCNC: 34.4 GM/DL — SIGNIFICANT CHANGE UP (ref 32–36)
MCHC RBC-ENTMCNC: 34.9 PG — HIGH (ref 27–34)
MCHC RBC-ENTMCNC: 34.9 PG — HIGH (ref 27–34)
MCV RBC AUTO: 101.4 FL — HIGH (ref 80–100)
MCV RBC AUTO: 101.4 FL — HIGH (ref 80–100)
MONOCYTES # BLD AUTO: 0.51 K/UL — SIGNIFICANT CHANGE UP (ref 0–0.9)
MONOCYTES # BLD AUTO: 0.51 K/UL — SIGNIFICANT CHANGE UP (ref 0–0.9)
MONOCYTES NFR BLD AUTO: 4.8 % — SIGNIFICANT CHANGE UP (ref 2–14)
MONOCYTES NFR BLD AUTO: 4.8 % — SIGNIFICANT CHANGE UP (ref 2–14)
NEUTROPHILS # BLD AUTO: 8.05 K/UL — HIGH (ref 1.8–7.4)
NEUTROPHILS # BLD AUTO: 8.05 K/UL — HIGH (ref 1.8–7.4)
NEUTROPHILS NFR BLD AUTO: 75.8 % — SIGNIFICANT CHANGE UP (ref 43–77)
NEUTROPHILS NFR BLD AUTO: 75.8 % — SIGNIFICANT CHANGE UP (ref 43–77)
NRBC # BLD: 0 /100 WBCS — SIGNIFICANT CHANGE UP (ref 0–0)
NRBC # BLD: 0 /100 WBCS — SIGNIFICANT CHANGE UP (ref 0–0)
NT-PROBNP SERPL-SCNC: 21 PG/ML — SIGNIFICANT CHANGE UP (ref 0–125)
NT-PROBNP SERPL-SCNC: 21 PG/ML — SIGNIFICANT CHANGE UP (ref 0–125)
PLATELET # BLD AUTO: 257 K/UL — SIGNIFICANT CHANGE UP (ref 150–400)
PLATELET # BLD AUTO: 257 K/UL — SIGNIFICANT CHANGE UP (ref 150–400)
POTASSIUM SERPL-MCNC: 4 MMOL/L — SIGNIFICANT CHANGE UP (ref 3.5–5.3)
POTASSIUM SERPL-MCNC: 4 MMOL/L — SIGNIFICANT CHANGE UP (ref 3.5–5.3)
POTASSIUM SERPL-SCNC: 4 MMOL/L — SIGNIFICANT CHANGE UP (ref 3.5–5.3)
POTASSIUM SERPL-SCNC: 4 MMOL/L — SIGNIFICANT CHANGE UP (ref 3.5–5.3)
PROT SERPL-MCNC: 8 G/DL — SIGNIFICANT CHANGE UP (ref 6–8.3)
PROT SERPL-MCNC: 8 G/DL — SIGNIFICANT CHANGE UP (ref 6–8.3)
RBC # BLD: 4.27 M/UL — SIGNIFICANT CHANGE UP (ref 3.8–5.2)
RBC # BLD: 4.27 M/UL — SIGNIFICANT CHANGE UP (ref 3.8–5.2)
RBC # FLD: 12.4 % — SIGNIFICANT CHANGE UP (ref 10.3–14.5)
RBC # FLD: 12.4 % — SIGNIFICANT CHANGE UP (ref 10.3–14.5)
SARS-COV-2 RNA SPEC QL NAA+PROBE: SIGNIFICANT CHANGE UP
SARS-COV-2 RNA SPEC QL NAA+PROBE: SIGNIFICANT CHANGE UP
SODIUM SERPL-SCNC: 138 MMOL/L — SIGNIFICANT CHANGE UP (ref 135–145)
SODIUM SERPL-SCNC: 138 MMOL/L — SIGNIFICANT CHANGE UP (ref 135–145)
TROPONIN I, HIGH SENSITIVITY RESULT: 7.1 NG/L — SIGNIFICANT CHANGE UP
TROPONIN I, HIGH SENSITIVITY RESULT: 7.1 NG/L — SIGNIFICANT CHANGE UP
WBC # BLD: 10.61 K/UL — HIGH (ref 3.8–10.5)
WBC # BLD: 10.61 K/UL — HIGH (ref 3.8–10.5)
WBC # FLD AUTO: 10.61 K/UL — HIGH (ref 3.8–10.5)
WBC # FLD AUTO: 10.61 K/UL — HIGH (ref 3.8–10.5)

## 2023-12-18 PROCEDURE — 71045 X-RAY EXAM CHEST 1 VIEW: CPT | Mod: 26

## 2023-12-18 PROCEDURE — 99285 EMERGENCY DEPT VISIT HI MDM: CPT

## 2023-12-18 RX ORDER — KETOROLAC TROMETHAMINE 30 MG/ML
30 SYRINGE (ML) INJECTION ONCE
Refills: 0 | Status: DISCONTINUED | OUTPATIENT
Start: 2023-12-18 | End: 2023-12-18

## 2023-12-18 RX ORDER — SODIUM CHLORIDE 9 MG/ML
1000 INJECTION INTRAMUSCULAR; INTRAVENOUS; SUBCUTANEOUS ONCE
Refills: 0 | Status: COMPLETED | OUTPATIENT
Start: 2023-12-18 | End: 2023-12-18

## 2023-12-18 RX ADMIN — Medication 30 MILLIGRAM(S): at 21:56

## 2023-12-18 RX ADMIN — SODIUM CHLORIDE 1000 MILLILITER(S): 9 INJECTION INTRAMUSCULAR; INTRAVENOUS; SUBCUTANEOUS at 21:57

## 2023-12-18 RX ADMIN — Medication 30 MILLIGRAM(S): at 22:22

## 2023-12-18 NOTE — ED ADULT TRIAGE NOTE - NS ED NURSE AMBULANCES
[facility], Ambulance Department [facility], Ambulance Department

## 2023-12-18 NOTE — ED ADULT NURSE NOTE - NSFALLRISKINTERV_ED_ALL_ED
Assistance OOB with selected safe patient handling equipment if applicable/Communicate fall risk and risk factors to all staff, patient, and family/Provide visual cue: yellow wristband, yellow gown, etc/Reinforce activity limits and safety measures with patient and family/Toileting schedule using arm’s reach rule for commode and bathroom/Call bell, personal items and telephone in reach/Instruct patient to call for assistance before getting out of bed/chair/stretcher/Non-slip footwear applied when patient is off stretcher/[place] to call system/Physically safe environment - no spills, clutter or unnecessary equipment/Purposeful Proactive Rounding/Room/bathroom lighting operational, light cord in reach Assistance OOB with selected safe patient handling equipment if applicable/Communicate fall risk and risk factors to all staff, patient, and family/Provide visual cue: yellow wristband, yellow gown, etc/Reinforce activity limits and safety measures with patient and family/Toileting schedule using arm’s reach rule for commode and bathroom/Call bell, personal items and telephone in reach/Instruct patient to call for assistance before getting out of bed/chair/stretcher/Non-slip footwear applied when patient is off stretcher/[place] to call system/Physically safe environment - no spills, clutter or unnecessary equipment/Purposeful Proactive Rounding/Room/bathroom lighting operational, light cord in reach

## 2023-12-18 NOTE — ED PROVIDER NOTE - PATIENT PORTAL LINK FT
You can access the FollowMyHealth Patient Portal offered by [facility] by registering at the following website: [email or web address]. By joining [facility]’s FollowMyHealth portal, you will also be able to view your health information using other applications (apps) compatible with our system. You can access the FollowMyHealth Patient Portal offered by [facility] by registering at the following website: [email or web address]. By joining [facility]’s FollowMyHealth portal, you will also be able to view your health information using other applications (apps) compatible with our system.

## 2023-12-18 NOTE — ED PROVIDER NOTE - NSFOLLOWUPCLINICS_GEN_ALL_ED_FT
[facility]  Internal Medicine  [address] [place] [address]  Phone: [phone]  Fax: [phone]     [facility]  Internal Medicine  [address] [place] [address]  Phone: [phone]  Fax: [phone]

## 2023-12-18 NOTE — ED ADULT NURSE NOTE - OBJECTIVE STATEMENT
Pt brought in by mom with c/o shivering, chills, and fevers. Pt has hx of down syndrome and is nonverbal. Noted pt shivering and is warm to the touch. Skin dry intact. Respirations regular, unlabored. IV heplock inserted. Labs collected. Meds given as ordered. Nursing will continue to monitor. Safety maintained.

## 2023-12-18 NOTE — ED PROVIDER NOTE - NSFOLLOWUPINSTRUCTIONS_ED_ALL_ED_FT
A UTI is caused by bacteria that get inside your urinary tract. Your urinary tract includes your kidneys, ureters, bladder, and urethra. A UTI is more common in your lower urinary tract, which includes your bladder and urethra.  Female Urinary System    What increases my risk for a UTI?    Older age    A urinary catheter or self-catheterization    Pregnancy    Urinary tract problems, such as a narrowing, kidney stones, or inability to empty your bladder completely    History of a UTI    Sexual intercourse    Menopause    Diabetes or obesity  What are the signs and symptoms of a UTI?    Urinating more often than usual, leaking urine, or waking from sleep to urinate    Pain or burning when you urinate    Pain or pressure in your lower abdomen and back    Urine that smells bad    Blood in your urine  How is a UTI diagnosed? Your healthcare provider will ask about your signs and symptoms. Your provider may press on your abdomen, sides, and back to check if you feel pain. You may need any of the following:    Urinalysis will show infection and your overall health.    Urine cultures may show which germ is causing your infection.    CT or MRI pictures may be used if you have UTIs often or do not respond to treatment. You may be given contrast liquid to help the pictures show up better. Tell a healthcare provider if you have ever had an allergic reaction to contrast liquid. Do not enter the MRI room with anything metal. Metal can cause serious injury. Tell a healthcare provider if you have any metal in or on your body.  How is a UTI treated?    Antibiotics treat a bacterial infection. If you have UTIs often (called recurrent UTIs), you may be given antibiotics to take regularly. You will be given directions for when and how to use antibiotics. The goal is to prevent UTIs but not cause antibiotic resistance by using antibiotics too often.    Medicines may be given to decrease pain and burning when you urinate. They will also help decrease the feeling that you need to urinate often. These medicines may make your urine orange or red.  What can I do to prevent a UTI?    Talk to your healthcare provider about your birth control method. You may need to change your method if it is increasing your risk for UTIs.    Empty your bladder often. Urinate and empty your bladder as soon as you feel the need. Do not hold your urine for long periods of time.    Wipe from front to back after you urinate or have a bowel movement. This will help prevent germs from getting into your urinary tract through your urethra.    Drink liquids as directed. Ask how much liquid to drink each day and which liquids are best for you. You may need to drink more liquids than usual to help flush out the bacteria. Do not drink alcohol, caffeine, or citrus juices. These can irritate your bladder and increase your symptoms. Your healthcare provider may recommend cranberry juice to help prevent a UTI.    Urinate before and after you have sex. This can help flush out bacteria passed during sex.    Do not douche or use feminine deodorants. These can change the chemical balance in your vagina.    Change sanitary pads or tampons often. This will help prevent germs from getting into your urinary tract.    Wear cotton underwear and clothes that are loose. Tight pants and nylon underwear can trap moisture and cause bacteria to grow.    Vaginal estrogen may be recommended. This medicine helps prevent UTIs in women who have gone through menopause or are in leonila-menopause.    Do pelvic muscle exercises often. Pelvic muscle exercises may help you start and stop urinating. Strong pelvic muscles may help you empty your bladder easier. Squeeze these muscles tightly for 5 seconds like you are trying to hold back urine. Then relax for 5 seconds. Gradually work up to squeezing for 10 seconds. Do 3 sets of 15 repetitions a day, or as directed.  When should I seek immediate care?    You are urinating very little or not at all.    You have a high fever with shaking chills.    You have side or back pain that gets worse.  When should I call my doctor?    You have a fever.    You do not feel better after 2 days of taking antibiotics.    You have new symptoms, such as blood or pus in your urine.    You are vomiting.    You have questions or concerns about your condition or care.

## 2023-12-18 NOTE — ED PROVIDER NOTE - PROGRESS NOTE DETAILS
labs are grossly unremarkable. cxr is clear. given no BM ct abdo/pelvis ordered. pending UA. Mahendra RUSSO: Received sign out from Dr. Sauer.  Informed to f/u CT due to decreased BM.  Pt with dwon syndrome and unable to stay still in CT.  Will order abd xray to r/o retained stools.  Pt otherwise in no distress. Mahendra RUSSO: Received sign out from Dr. Sauer.  Informed to f/u CT due to decreased BM.  Pt with down syndrome and unable to stay still in CT.  Will order abd xray to r/o retained stools.  Pt otherwise in no distress. Consistent with urinary tract infection IV ceftriaxone ordered and I will prescribe patient Ceftin.  Mother states patient can take oral antibiotics with food.  Patient has no shivering or shaking activity on my evaluation.  Patient is at baseline state of health as per mother.  X-ray consistent with retained stools, no bowel obstruction.  I will also prescribe stool softener/Colace.  Safe transport arranged for patient back home.  Mother will follow-up with PMD.  Return precautions explained and questions answered.

## 2023-12-18 NOTE — ED PROVIDER NOTE - OBJECTIVE STATEMENT
43 year old female PMH down syndrome, cataracts, nonverbal, epilepsy coming in with 4 days of shaking. Hx as per the patients mom. states she has been taking her keppra and lacosamide as directed. states no BM for the past 4 days. denies uri symptoms, cough, vomiting, fevers.

## 2023-12-19 VITALS
TEMPERATURE: 99 F | HEART RATE: 72 BPM | RESPIRATION RATE: 17 BRPM | OXYGEN SATURATION: 100 % | DIASTOLIC BLOOD PRESSURE: 59 MMHG | SYSTOLIC BLOOD PRESSURE: 109 MMHG

## 2023-12-19 LAB
APPEARANCE UR: CLEAR — SIGNIFICANT CHANGE UP
APPEARANCE UR: CLEAR — SIGNIFICANT CHANGE UP
BILIRUB UR-MCNC: NEGATIVE — SIGNIFICANT CHANGE UP
BILIRUB UR-MCNC: NEGATIVE — SIGNIFICANT CHANGE UP
COLOR SPEC: YELLOW — SIGNIFICANT CHANGE UP
COLOR SPEC: YELLOW — SIGNIFICANT CHANGE UP
DIFF PNL FLD: ABNORMAL
DIFF PNL FLD: ABNORMAL
GLUCOSE UR QL: NEGATIVE MG/DL — SIGNIFICANT CHANGE UP
GLUCOSE UR QL: NEGATIVE MG/DL — SIGNIFICANT CHANGE UP
KETONES UR-MCNC: NEGATIVE MG/DL — SIGNIFICANT CHANGE UP
KETONES UR-MCNC: NEGATIVE MG/DL — SIGNIFICANT CHANGE UP
LEUKOCYTE ESTERASE UR-ACNC: ABNORMAL
LEUKOCYTE ESTERASE UR-ACNC: ABNORMAL
NITRITE UR-MCNC: NEGATIVE — SIGNIFICANT CHANGE UP
NITRITE UR-MCNC: NEGATIVE — SIGNIFICANT CHANGE UP
PH UR: 7 — SIGNIFICANT CHANGE UP (ref 5–8)
PH UR: 7 — SIGNIFICANT CHANGE UP (ref 5–8)
PROT UR-MCNC: NEGATIVE MG/DL — SIGNIFICANT CHANGE UP
PROT UR-MCNC: NEGATIVE MG/DL — SIGNIFICANT CHANGE UP
SP GR SPEC: 1.01 — SIGNIFICANT CHANGE UP (ref 1–1.03)
SP GR SPEC: 1.01 — SIGNIFICANT CHANGE UP (ref 1–1.03)
UROBILINOGEN FLD QL: 0.2 MG/DL — SIGNIFICANT CHANGE UP (ref 0.2–1)
UROBILINOGEN FLD QL: 0.2 MG/DL — SIGNIFICANT CHANGE UP (ref 0.2–1)

## 2023-12-19 PROCEDURE — 80053 COMPREHEN METABOLIC PANEL: CPT

## 2023-12-19 PROCEDURE — 87186 SC STD MICRODIL/AGAR DIL: CPT

## 2023-12-19 PROCEDURE — 74018 RADEX ABDOMEN 1 VIEW: CPT | Mod: 26

## 2023-12-19 PROCEDURE — 83690 ASSAY OF LIPASE: CPT

## 2023-12-19 PROCEDURE — 87637 SARSCOV2&INF A&B&RSV AMP PRB: CPT

## 2023-12-19 PROCEDURE — 83735 ASSAY OF MAGNESIUM: CPT

## 2023-12-19 PROCEDURE — 84702 CHORIONIC GONADOTROPIN TEST: CPT

## 2023-12-19 PROCEDURE — 71045 X-RAY EXAM CHEST 1 VIEW: CPT

## 2023-12-19 PROCEDURE — 81001 URINALYSIS AUTO W/SCOPE: CPT

## 2023-12-19 PROCEDURE — 96375 TX/PRO/DX INJ NEW DRUG ADDON: CPT

## 2023-12-19 PROCEDURE — 87086 URINE CULTURE/COLONY COUNT: CPT

## 2023-12-19 PROCEDURE — 83880 ASSAY OF NATRIURETIC PEPTIDE: CPT

## 2023-12-19 PROCEDURE — 96374 THER/PROPH/DIAG INJ IV PUSH: CPT

## 2023-12-19 PROCEDURE — 84484 ASSAY OF TROPONIN QUANT: CPT

## 2023-12-19 PROCEDURE — 74018 RADEX ABDOMEN 1 VIEW: CPT

## 2023-12-19 PROCEDURE — 99284 EMERGENCY DEPT VISIT MOD MDM: CPT | Mod: 25

## 2023-12-19 PROCEDURE — 83605 ASSAY OF LACTIC ACID: CPT

## 2023-12-19 PROCEDURE — 85025 COMPLETE CBC W/AUTO DIFF WBC: CPT

## 2023-12-19 PROCEDURE — 36415 COLL VENOUS BLD VENIPUNCTURE: CPT

## 2023-12-19 RX ORDER — CEFUROXIME AXETIL 250 MG
1 TABLET ORAL
Qty: 14 | Refills: 0
Start: 2023-12-19 | End: 2023-12-25

## 2023-12-19 RX ORDER — DOCUSATE SODIUM 100 MG
1 CAPSULE ORAL
Qty: 20 | Refills: 0
Start: 2023-12-19

## 2023-12-19 RX ORDER — CEFTRIAXONE 500 MG/1
1000 INJECTION, POWDER, FOR SOLUTION INTRAMUSCULAR; INTRAVENOUS ONCE
Refills: 0 | Status: COMPLETED | OUTPATIENT
Start: 2023-12-19 | End: 2023-12-19

## 2023-12-19 RX ADMIN — CEFTRIAXONE 100 MILLIGRAM(S): 500 INJECTION, POWDER, FOR SOLUTION INTRAMUSCULAR; INTRAVENOUS at 03:28

## 2023-12-19 NOTE — ED ADULT NURSE REASSESSMENT NOTE - NS ED NURSE REASSESS COMMENT FT1
44 y/o female received awake alert and oreinted to person, mother presently at [facility]. Patient presently discharged pending transportation 44 y/o female received awake alert and oreinted to person, mother presently at [facility]. Patient presently discharged pending transportation

## 2023-12-21 LAB
-  AMOXICILLIN/CLAVULANIC ACID: SIGNIFICANT CHANGE UP
-  AMOXICILLIN/CLAVULANIC ACID: SIGNIFICANT CHANGE UP
-  AMPICILLIN/SULBACTAM: SIGNIFICANT CHANGE UP
-  AMPICILLIN/SULBACTAM: SIGNIFICANT CHANGE UP
-  AMPICILLIN: SIGNIFICANT CHANGE UP
-  AMPICILLIN: SIGNIFICANT CHANGE UP
-  AZTREONAM: SIGNIFICANT CHANGE UP
-  AZTREONAM: SIGNIFICANT CHANGE UP
-  CEFAZOLIN: SIGNIFICANT CHANGE UP
-  CEFAZOLIN: SIGNIFICANT CHANGE UP
-  CEFEPIME: SIGNIFICANT CHANGE UP
-  CEFEPIME: SIGNIFICANT CHANGE UP
-  CEFOXITIN: SIGNIFICANT CHANGE UP
-  CEFOXITIN: SIGNIFICANT CHANGE UP
-  CEFTRIAXONE: SIGNIFICANT CHANGE UP
-  CEFTRIAXONE: SIGNIFICANT CHANGE UP
-  CEFUROXIME: SIGNIFICANT CHANGE UP
-  CEFUROXIME: SIGNIFICANT CHANGE UP
-  CIPROFLOXACIN: SIGNIFICANT CHANGE UP
-  CIPROFLOXACIN: SIGNIFICANT CHANGE UP
-  ERTAPENEM: SIGNIFICANT CHANGE UP
-  ERTAPENEM: SIGNIFICANT CHANGE UP
-  GENTAMICIN: SIGNIFICANT CHANGE UP
-  GENTAMICIN: SIGNIFICANT CHANGE UP
-  IMIPENEM: SIGNIFICANT CHANGE UP
-  IMIPENEM: SIGNIFICANT CHANGE UP
-  LEVOFLOXACIN: SIGNIFICANT CHANGE UP
-  LEVOFLOXACIN: SIGNIFICANT CHANGE UP
-  MEROPENEM: SIGNIFICANT CHANGE UP
-  MEROPENEM: SIGNIFICANT CHANGE UP
-  NITROFURANTOIN: SIGNIFICANT CHANGE UP
-  NITROFURANTOIN: SIGNIFICANT CHANGE UP
-  PIPERACILLIN/TAZOBACTAM: SIGNIFICANT CHANGE UP
-  PIPERACILLIN/TAZOBACTAM: SIGNIFICANT CHANGE UP
-  TOBRAMYCIN: SIGNIFICANT CHANGE UP
-  TOBRAMYCIN: SIGNIFICANT CHANGE UP
-  TRIMETHOPRIM/SULFAMETHOXAZOLE: SIGNIFICANT CHANGE UP
-  TRIMETHOPRIM/SULFAMETHOXAZOLE: SIGNIFICANT CHANGE UP
CULTURE RESULTS: ABNORMAL
CULTURE RESULTS: ABNORMAL
METHOD TYPE: SIGNIFICANT CHANGE UP
METHOD TYPE: SIGNIFICANT CHANGE UP
ORGANISM # SPEC MICROSCOPIC CNT: ABNORMAL
SPECIMEN SOURCE: SIGNIFICANT CHANGE UP
SPECIMEN SOURCE: SIGNIFICANT CHANGE UP

## 2024-01-22 ENCOUNTER — INPATIENT (INPATIENT)
Facility: HOSPITAL | Age: 44
LOS: 8 days | Discharge: HOME | DRG: 871 | End: 2024-01-31
Attending: INTERNAL MEDICINE | Admitting: PSYCHIATRY & NEUROLOGY
Payer: MEDICARE

## 2024-01-22 VITALS
SYSTOLIC BLOOD PRESSURE: 132 MMHG | OXYGEN SATURATION: 96 % | HEART RATE: 95 BPM | DIASTOLIC BLOOD PRESSURE: 104 MMHG | TEMPERATURE: 98 F | RESPIRATION RATE: 18 BRPM

## 2024-01-22 DIAGNOSIS — Z98.51 TUBAL LIGATION STATUS: Chronic | ICD-10-CM

## 2024-01-22 DIAGNOSIS — R56.9 UNSPECIFIED CONVULSIONS: ICD-10-CM

## 2024-01-22 LAB
ALBUMIN SERPL ELPH-MCNC: 3.8 G/DL — SIGNIFICANT CHANGE UP (ref 3.3–5)
ALP SERPL-CCNC: 90 U/L — SIGNIFICANT CHANGE UP (ref 40–120)
ALT FLD-CCNC: 28 U/L — SIGNIFICANT CHANGE UP (ref 10–45)
ANION GAP SERPL CALC-SCNC: 11 MMOL/L — SIGNIFICANT CHANGE UP (ref 5–17)
ANION GAP SERPL CALC-SCNC: 14 MMOL/L — SIGNIFICANT CHANGE UP (ref 5–17)
APTT BLD: 27.7 SEC — SIGNIFICANT CHANGE UP (ref 24.5–35.6)
AST SERPL-CCNC: 49 U/L — HIGH (ref 10–40)
BASE EXCESS BLDV CALC-SCNC: 2.2 MMOL/L — SIGNIFICANT CHANGE UP (ref -2–3)
BASOPHILS # BLD AUTO: 0.04 K/UL — SIGNIFICANT CHANGE UP (ref 0–0.2)
BASOPHILS NFR BLD AUTO: 0.4 % — SIGNIFICANT CHANGE UP (ref 0–2)
BILIRUB SERPL-MCNC: 0.5 MG/DL — SIGNIFICANT CHANGE UP (ref 0.2–1.2)
BUN SERPL-MCNC: 17 MG/DL — SIGNIFICANT CHANGE UP (ref 7–23)
BUN SERPL-MCNC: 17 MG/DL — SIGNIFICANT CHANGE UP (ref 7–23)
CA-I SERPL-SCNC: 1.16 MMOL/L — SIGNIFICANT CHANGE UP (ref 1.15–1.33)
CALCIUM SERPL-MCNC: 8.2 MG/DL — LOW (ref 8.4–10.5)
CALCIUM SERPL-MCNC: 8.5 MG/DL — SIGNIFICANT CHANGE UP (ref 8.4–10.5)
CHLORIDE BLDV-SCNC: 105 MMOL/L — SIGNIFICANT CHANGE UP (ref 96–108)
CHLORIDE SERPL-SCNC: 106 MMOL/L — SIGNIFICANT CHANGE UP (ref 96–108)
CHLORIDE SERPL-SCNC: 107 MMOL/L — SIGNIFICANT CHANGE UP (ref 96–108)
CO2 BLDV-SCNC: 33 MMOL/L — HIGH (ref 22–26)
CO2 SERPL-SCNC: 23 MMOL/L — SIGNIFICANT CHANGE UP (ref 22–31)
CO2 SERPL-SCNC: 25 MMOL/L — SIGNIFICANT CHANGE UP (ref 22–31)
CREAT SERPL-MCNC: 0.94 MG/DL — SIGNIFICANT CHANGE UP (ref 0.5–1.3)
CREAT SERPL-MCNC: 0.98 MG/DL — SIGNIFICANT CHANGE UP (ref 0.5–1.3)
EGFR: 73 ML/MIN/1.73M2 — SIGNIFICANT CHANGE UP
EGFR: 77 ML/MIN/1.73M2 — SIGNIFICANT CHANGE UP
EOSINOPHIL # BLD AUTO: 0.01 K/UL — SIGNIFICANT CHANGE UP (ref 0–0.5)
EOSINOPHIL NFR BLD AUTO: 0.1 % — SIGNIFICANT CHANGE UP (ref 0–6)
GAS PNL BLDV: 137 MMOL/L — SIGNIFICANT CHANGE UP (ref 136–145)
GAS PNL BLDV: SIGNIFICANT CHANGE UP
GAS PNL BLDV: SIGNIFICANT CHANGE UP
GLUCOSE BLDV-MCNC: 104 MG/DL — HIGH (ref 70–99)
GLUCOSE SERPL-MCNC: 104 MG/DL — HIGH (ref 70–99)
GLUCOSE SERPL-MCNC: 111 MG/DL — HIGH (ref 70–99)
HCO3 BLDV-SCNC: 31 MMOL/L — HIGH (ref 22–29)
HCT VFR BLD CALC: 37 % — SIGNIFICANT CHANGE UP (ref 34.5–45)
HCT VFR BLD CALC: 43.9 % — SIGNIFICANT CHANGE UP (ref 34.5–45)
HCT VFR BLDA CALC: 44 % — SIGNIFICANT CHANGE UP (ref 34.5–46.5)
HGB BLD CALC-MCNC: 14.5 G/DL — SIGNIFICANT CHANGE UP (ref 11.7–16.1)
HGB BLD-MCNC: 12.4 G/DL — SIGNIFICANT CHANGE UP (ref 11.5–15.5)
HGB BLD-MCNC: 14.3 G/DL — SIGNIFICANT CHANGE UP (ref 11.5–15.5)
IMM GRANULOCYTES NFR BLD AUTO: 0.3 % — SIGNIFICANT CHANGE UP (ref 0–0.9)
INR BLD: 1.1 RATIO — SIGNIFICANT CHANGE UP (ref 0.85–1.18)
LACTATE BLDV-MCNC: 2.5 MMOL/L — HIGH (ref 0.5–2)
LACTATE SERPL-SCNC: 1 MMOL/L — SIGNIFICANT CHANGE UP (ref 0.5–2)
LYMPHOCYTES # BLD AUTO: 2.02 K/UL — SIGNIFICANT CHANGE UP (ref 1–3.3)
LYMPHOCYTES # BLD AUTO: 21.9 % — SIGNIFICANT CHANGE UP (ref 13–44)
MAGNESIUM SERPL-MCNC: 1.9 MG/DL — SIGNIFICANT CHANGE UP (ref 1.6–2.6)
MCHC RBC-ENTMCNC: 32.6 GM/DL — SIGNIFICANT CHANGE UP (ref 32–36)
MCHC RBC-ENTMCNC: 33.5 GM/DL — SIGNIFICANT CHANGE UP (ref 32–36)
MCHC RBC-ENTMCNC: 34.2 PG — HIGH (ref 27–34)
MCHC RBC-ENTMCNC: 34.5 PG — HIGH (ref 27–34)
MCV RBC AUTO: 103.1 FL — HIGH (ref 80–100)
MCV RBC AUTO: 105 FL — HIGH (ref 80–100)
MONOCYTES # BLD AUTO: 0.21 K/UL — SIGNIFICANT CHANGE UP (ref 0–0.9)
MONOCYTES NFR BLD AUTO: 2.3 % — SIGNIFICANT CHANGE UP (ref 2–14)
NEUTROPHILS # BLD AUTO: 6.93 K/UL — SIGNIFICANT CHANGE UP (ref 1.8–7.4)
NEUTROPHILS NFR BLD AUTO: 75 % — SIGNIFICANT CHANGE UP (ref 43–77)
NRBC # BLD: 0 /100 WBCS — SIGNIFICANT CHANGE UP (ref 0–0)
NRBC # BLD: 0 /100 WBCS — SIGNIFICANT CHANGE UP (ref 0–0)
PCO2 BLDV: 68 MMHG — HIGH (ref 39–42)
PH BLDV: 7.27 — LOW (ref 7.32–7.43)
PHOSPHATE SERPL-MCNC: 3.2 MG/DL — SIGNIFICANT CHANGE UP (ref 2.5–4.5)
PLATELET # BLD AUTO: 200 K/UL — SIGNIFICANT CHANGE UP (ref 150–400)
PLATELET # BLD AUTO: 215 K/UL — SIGNIFICANT CHANGE UP (ref 150–400)
PO2 BLDV: 27 MMHG — SIGNIFICANT CHANGE UP (ref 25–45)
POTASSIUM BLDV-SCNC: 5.2 MMOL/L — HIGH (ref 3.5–5.1)
POTASSIUM SERPL-MCNC: 4 MMOL/L — SIGNIFICANT CHANGE UP (ref 3.5–5.3)
POTASSIUM SERPL-MCNC: 5.7 MMOL/L — HIGH (ref 3.5–5.3)
POTASSIUM SERPL-SCNC: 4 MMOL/L — SIGNIFICANT CHANGE UP (ref 3.5–5.3)
POTASSIUM SERPL-SCNC: 5.7 MMOL/L — HIGH (ref 3.5–5.3)
PROT SERPL-MCNC: 8 G/DL — SIGNIFICANT CHANGE UP (ref 6–8.3)
PROTHROM AB SERPL-ACNC: 11.5 SEC — SIGNIFICANT CHANGE UP (ref 9.5–13)
RBC # BLD: 3.59 M/UL — LOW (ref 3.8–5.2)
RBC # BLD: 4.18 M/UL — SIGNIFICANT CHANGE UP (ref 3.8–5.2)
RBC # FLD: 12.6 % — SIGNIFICANT CHANGE UP (ref 10.3–14.5)
RBC # FLD: 12.6 % — SIGNIFICANT CHANGE UP (ref 10.3–14.5)
SAO2 % BLDV: 38.8 % — LOW (ref 67–88)
SARS-COV-2 RNA SPEC QL NAA+PROBE: SIGNIFICANT CHANGE UP
SODIUM SERPL-SCNC: 143 MMOL/L — SIGNIFICANT CHANGE UP (ref 135–145)
SODIUM SERPL-SCNC: 143 MMOL/L — SIGNIFICANT CHANGE UP (ref 135–145)
WBC # BLD: 13.45 K/UL — HIGH (ref 3.8–10.5)
WBC # BLD: 9.24 K/UL — SIGNIFICANT CHANGE UP (ref 3.8–10.5)
WBC # FLD AUTO: 13.45 K/UL — HIGH (ref 3.8–10.5)
WBC # FLD AUTO: 9.24 K/UL — SIGNIFICANT CHANGE UP (ref 3.8–10.5)

## 2024-01-22 PROCEDURE — 95720 EEG PHY/QHP EA INCR W/VEEG: CPT

## 2024-01-22 PROCEDURE — 99285 EMERGENCY DEPT VISIT HI MDM: CPT

## 2024-01-22 RX ORDER — MIDAZOLAM HYDROCHLORIDE 1 MG/ML
10 INJECTION, SOLUTION INTRAMUSCULAR; INTRAVENOUS ONCE
Refills: 0 | Status: DISCONTINUED | OUTPATIENT
Start: 2024-01-22 | End: 2024-01-22

## 2024-01-22 RX ORDER — LEVETIRACETAM 250 MG/1
1000 TABLET, FILM COATED ORAL ONCE
Refills: 0 | Status: COMPLETED | OUTPATIENT
Start: 2024-01-22 | End: 2024-01-22

## 2024-01-22 RX ORDER — TRAZODONE HCL 50 MG
100 TABLET ORAL AT BEDTIME
Refills: 0 | Status: DISCONTINUED | OUTPATIENT
Start: 2024-01-22 | End: 2024-01-31

## 2024-01-22 RX ORDER — ACETAMINOPHEN 500 MG
1000 TABLET ORAL ONCE
Refills: 0 | Status: DISCONTINUED | OUTPATIENT
Start: 2024-01-22 | End: 2024-01-22

## 2024-01-22 RX ORDER — ENOXAPARIN SODIUM 100 MG/ML
40 INJECTION SUBCUTANEOUS EVERY 24 HOURS
Refills: 0 | Status: DISCONTINUED | OUTPATIENT
Start: 2024-01-22 | End: 2024-01-31

## 2024-01-22 RX ORDER — SODIUM CHLORIDE 9 MG/ML
1000 INJECTION, SOLUTION INTRAVENOUS
Refills: 0 | Status: DISCONTINUED | OUTPATIENT
Start: 2024-01-22 | End: 2024-01-23

## 2024-01-22 RX ORDER — LEVETIRACETAM 250 MG/1
750 TABLET, FILM COATED ORAL EVERY 12 HOURS
Refills: 0 | Status: DISCONTINUED | OUTPATIENT
Start: 2024-01-22 | End: 2024-01-23

## 2024-01-22 RX ORDER — LACOSAMIDE 50 MG/1
1 TABLET ORAL
Refills: 0 | DISCHARGE

## 2024-01-22 RX ORDER — LEVETIRACETAM 250 MG/1
750 TABLET, FILM COATED ORAL
Refills: 0 | Status: DISCONTINUED | OUTPATIENT
Start: 2024-01-22 | End: 2024-01-22

## 2024-01-22 RX ORDER — ACETAMINOPHEN 500 MG
750 TABLET ORAL ONCE
Refills: 0 | Status: COMPLETED | OUTPATIENT
Start: 2024-01-22 | End: 2024-01-22

## 2024-01-22 RX ORDER — LEVETIRACETAM 250 MG/1
1000 TABLET, FILM COATED ORAL ONCE
Refills: 0 | Status: DISCONTINUED | OUTPATIENT
Start: 2024-01-22 | End: 2024-01-23

## 2024-01-22 RX ORDER — INFLUENZA VIRUS VACCINE 15; 15; 15; 15 UG/.5ML; UG/.5ML; UG/.5ML; UG/.5ML
0.5 SUSPENSION INTRAMUSCULAR ONCE
Refills: 0 | Status: DISCONTINUED | OUTPATIENT
Start: 2024-01-22 | End: 2024-01-31

## 2024-01-22 RX ORDER — LACOSAMIDE 50 MG/1
100 TABLET ORAL
Refills: 0 | Status: DISCONTINUED | OUTPATIENT
Start: 2024-01-22 | End: 2024-01-22

## 2024-01-22 RX ORDER — LACOSAMIDE 50 MG/1
100 TABLET ORAL EVERY 12 HOURS
Refills: 0 | Status: ACTIVE | OUTPATIENT
Start: 2024-01-22 | End: 2024-02-21

## 2024-01-22 RX ADMIN — LEVETIRACETAM 750 MILLIGRAM(S): 250 TABLET, FILM COATED ORAL at 21:02

## 2024-01-22 RX ADMIN — LACOSAMIDE 120 MILLIGRAM(S): 50 TABLET ORAL at 21:02

## 2024-01-22 RX ADMIN — SODIUM CHLORIDE 60 MILLILITER(S): 9 INJECTION, SOLUTION INTRAVENOUS at 19:45

## 2024-01-22 RX ADMIN — LEVETIRACETAM 400 MILLIGRAM(S): 250 TABLET, FILM COATED ORAL at 17:20

## 2024-01-22 RX ADMIN — MIDAZOLAM HYDROCHLORIDE 10 MILLIGRAM(S): 1 INJECTION, SOLUTION INTRAMUSCULAR; INTRAVENOUS at 16:45

## 2024-01-22 RX ADMIN — Medication 300 MILLIGRAM(S): at 19:44

## 2024-01-22 RX ADMIN — Medication 750 MILLIGRAM(S): at 20:30

## 2024-01-22 RX ADMIN — Medication 2 MILLIGRAM(S): at 17:00

## 2024-01-22 NOTE — H&P ADULT - NSHPPHYSICALEXAM_GEN_ALL_CORE
Neurological Exam:  Mental Status: eyes closed, no verbal output, does not follow simple/complex commands   Cranial Nerves:   PERR, EOMI, blink to threat intact b/l.  No facial asymmetry.  tongue midline.  oculocephalic intact, corneal intact    Motor:   Tone: increased              Strength:     Does not participate in motor testing. 1/5 throughout                  Dysmetria: did not participate     Sensation: withdraws x 4     Gait: ANGELINA given c/f safety

## 2024-01-22 NOTE — H&P ADULT - ATTENDING COMMENTS
Admit for diagnostic video EEG monitoring, and AED management.  Consult internal medicine regarding fever, UTI management.  IV fluids until patient able to tolerate PO.  Seizure precautions.  Taper off Keppra, continue LCM 100mg PO BID.    Yao Bass MD  Neurology Attending Physician

## 2024-01-22 NOTE — ED PROVIDER NOTE - PROGRESS NOTE DETAILS
Cory, PGY3 - Neurology accepting patient under Dr. Bass Cory, PGY3 - Neurology accepting patient under Dr. Bass. Cory, PGY3 - neurology requesting 2mg ativan and 1g keppra. Patient already admitted

## 2024-01-22 NOTE — ED ADULT NURSE NOTE - CCCP TRG CHIEF CMPLNT
Patient asked if she has taken Percocet before, she verbalizes \"yes\" and asked if she did ok with it and she verbalizes \"yes\". Seizure

## 2024-01-22 NOTE — ED PROVIDER NOTE - CLINICAL SUMMARY MEDICAL DECISION MAKING FREE TEXT BOX
Cory, PGY3 - Cory, PGY3 - 43-year-old woman with PMH seizures, presenting to the emergency room after CODE BLUE was called from the admitting office due to seizure-like activity. versed given with cessation of seizure like activity. Will call neurology, will need to be transferred to the EMU as soon as seizures controlled. *The above represents an initial assessment/impression. Please refer to progress notes for potential changes in patient clinical course* Cory, PGY3 - 43-year-old woman with PMH seizures, presenting to the emergency room after CODE BLUE was called from the admitting office due to seizure-like activity. versed given with cessation of seizure like activity. Will call neurology, will need to be transferred to the EMU as soon as seizures controlled. *The above represents an initial assessment/impression. Please refer to progress notes for potential changes in patient clinical course*    Virginia Romero MD - Attending Physician: Pt here with known seizures presented to Admitting for direct admit to EMU for veeg, had seizure, rapid response called and brought to ED. Ativan given, with resolution of seizure. No signs of trauma. Neuro for admit to EMU

## 2024-01-22 NOTE — ED PROVIDER NOTE - PHYSICAL EXAMINATION
Contact 7W with any questions you may have about your discharge instructions [phone].   Gen: shaking, non-toxic  Head: NCAT  HEENT: unable to assess EOMI, eyes clenched   Lung: CTAB, no respiratory distress, no wheezes/rhonchi/rales B/L   CV: pulses bilaterally   Abd: soft, NTND   MSK: no visible bony deformities  Neuro: shaking, rigid upper extremities   Skin: Warm, well perfused, no rash

## 2024-01-22 NOTE — H&P ADULT - ASSESSMENT
Impression:     Plan:   [] Admit to EMU under Dr. Bass  [] Continue home dose ASMs for now (Keppra and Vimpat)  [] Get updated med rec from family and continue remainder of home meds   [] Rescue: 1st line: IV 1 mg Ativan, 2nd line IV Keppra   [] Seizure and fall precautions     Plan d/w EMU attending    Impression: Recurrent GTC events of unclear etiology. R/o primary neurologic etiology vs provoking factor.     Plan:   [] Admit to EMU under Dr. Bass  [] Continue home dose ASMs for now (Keppra 750 mg bid and Vimpat 100 mg bid)  [] C/w home trazodone 100 mg  [] Rescue: 1st line: IV 1 mg Ativan, 2nd line IV Keppra 1g  [] Seizure and fall precautions     Plan d/w EMU attending

## 2024-01-22 NOTE — ED PROVIDER NOTE - NS ED MD DISPO SPECIAL CONSIDERATION1
Miles with Leyla ALEJO. \"patient is neuro cleared. Out patient visit in 4wks.  Our office will call and set up the appointment.\"   None

## 2024-01-22 NOTE — PATIENT PROFILE ADULT - ..
22-Jan-2024 20:05:57
47 y/o F pt with no PMHx and PSHx of R Wrist Surgery (on Tendon) presents to ED c/o b/l elbow pain, R wrist pain, and lower back pain s/p mechanical fall 3 hours PTA (17:00pm today). 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 Pt claims her R wrist is throbbing at the site of her past surgery. Pt denies head trauma, LOC, numbness, tingling, weakness, or any other complaints. Pt also denies taking any medication for pain relief. NKDA.

## 2024-01-22 NOTE — ED ADULT NURSE NOTE - OBJECTIVE STATEMENT
43F brought in from admission after a code blue was called 43F brought in from admission after a code blue was called when patient ahd a seizure while awaiting direct admission. patient with h/o Downs sx, epilepsy, on Keppra and Vimpat at home. Patient post ictal with some tremors on upper arms. 43F brought in from admission after a code blue was called when patient ahd a seizure while awaiting direct admission. patient with h/o Downs sx, epilepsy, on Keppra and Vimpat at home. Patient post ictal with some tremors on upper arms. Patient was given versed followed by Ativan ivp and Keppra 1gm.  Patient noted shivering on extremities, as per HHA, patient slowly declining, not able to walk and not moving her extremities, patient incontinent o urine and bladder.

## 2024-01-22 NOTE — ED PROVIDER NOTE - OBJECTIVE STATEMENT
44yo woman with PMH Down syndrome, dementia, nonverbal, decreased mobility, epilepsy on Vimpat, Keppra, Trazodone, who presented for elective EMU admission, now presenting to the ER after a code blue was called in admitting office 2/2 seizure activity.     Per neurology:   "First event was GTC in Oct 2022, patient was seen at [facility] at the time. EEG (10/21/22) No seizures recorded, but noted to have with L>R frontal spikes. CTH non con was normal. She was started on VPA while inpatient. Patient was transitioned to Keppra in outpatient setting by Dr. Sourav Cardenas.  Patient was taking Keppra 750mg BID, still having seizures per family- did not tolerate increase. Patient now follows with Dr. Baum, Vimpat 100mg BID was  added. Per family, seizure frequency is increasing, however unclear if events are true seizures or behavioral/ agitation related. EMU admission was recommended for event characterization and medication adjustment."

## 2024-01-22 NOTE — PATIENT PROFILE ADULT - FALL HARM RISK - HARM RISK INTERVENTIONS

## 2024-01-22 NOTE — H&P ADULT - HISTORY OF PRESENT ILLNESS
Per Dr. Allen, patient notified that her CMP showed she  Looks dehydrated slightly . Increase her fluid intake.   Patient verbalized understanding.              42 y/o female with PMH of Down syndrome, dementia, nonverbal, decreased mobility, epilepsy who presents for elective EMU admission.    First event was GTC in Oct 2022, patient was seen at [facility] at the time. EEG (10/21/22) No seizures recorded, but noted to have with L>R frontal spikes. CTH non con was normal. She was started on VPA while inpatient. Patient was transitioned to Keppra in outpatient setting by Dr. Sourav Cardenas.  Patient was taking Keppra 750mg BID, still having seizures per family- did not tolerate increase. Patient now follows with Dr. Baum, Vimpat 100mg BID was  added.     Per family, seizure frequency is increasing, however unclear if events are true seizures or behavioral/ agitation related. EMU admission was recommended for event characterization and medication adjustment.     Patient seen at  in 12/18/23 for increased seizure frequency, treated for UTI and kept on same dose ASMs.  44 y/o female with PMH of Down syndrome, dementia, nonverbal, decreased mobility, epilepsy who presents for elective EMU admission.    First event was GTC in Oct 2022, patient was seen at [facility] at the time. EEG (10/21/22) No seizures recorded, but noted to have with L>R frontal spikes. CTH non con was normal. She was started on VPA while inpatient. Patient was transitioned to Keppra in outpatient setting by Dr. Sourav Cardenas.  Patient was taking Keppra 750mg BID, still having seizures per family- did not tolerate increase. Patient now follows with Dr. Baum, Vimpat 100mg BID was  added.     Per family, seizure frequency is increasing, however unclear if events are true seizures or behavioral/ agitation related. EMU admission was recommended for event characterization and medication adjustment.     Patient seen at  in 12/18/23 for increased seizure frequency, treated for UTI and kept on same dose ASMs.     Home ASM's   Morning-Keppra 750 mg, Vimpat 100 mg   Afternoon-Vimpat 100 mg   Evening-Keppra 750 mg, Trazodone 100 mg     Of note, had seizure in admitting, code blue was called, given IV 10 mg versed and brought to ED. In the ED, had another seizure, given IV 2 mg ativan and IV 1g keppra with resolution.  [place] all pertinent systems normal

## 2024-01-23 LAB
ANION GAP SERPL CALC-SCNC: 13 MMOL/L — SIGNIFICANT CHANGE UP (ref 5–17)
ANION GAP SERPL CALC-SCNC: 9 MMOL/L — SIGNIFICANT CHANGE UP (ref 5–17)
APPEARANCE UR: ABNORMAL
BACTERIA # UR AUTO: ABNORMAL /HPF
BILIRUB UR-MCNC: NEGATIVE — SIGNIFICANT CHANGE UP
BUN SERPL-MCNC: 11 MG/DL — SIGNIFICANT CHANGE UP (ref 7–23)
BUN SERPL-MCNC: 11 MG/DL — SIGNIFICANT CHANGE UP (ref 7–23)
CALCIUM SERPL-MCNC: 7.3 MG/DL — LOW (ref 8.4–10.5)
CALCIUM SERPL-MCNC: 8.3 MG/DL — LOW (ref 8.4–10.5)
CAST: 1 /LPF — SIGNIFICANT CHANGE UP (ref 0–4)
CHLORIDE SERPL-SCNC: 107 MMOL/L — SIGNIFICANT CHANGE UP (ref 96–108)
CHLORIDE SERPL-SCNC: 111 MMOL/L — HIGH (ref 96–108)
CO2 SERPL-SCNC: 24 MMOL/L — SIGNIFICANT CHANGE UP (ref 22–31)
CO2 SERPL-SCNC: 26 MMOL/L — SIGNIFICANT CHANGE UP (ref 22–31)
COLOR SPEC: YELLOW — SIGNIFICANT CHANGE UP
CREAT SERPL-MCNC: 0.67 MG/DL — SIGNIFICANT CHANGE UP (ref 0.5–1.3)
CREAT SERPL-MCNC: 0.75 MG/DL — SIGNIFICANT CHANGE UP (ref 0.5–1.3)
DIFF PNL FLD: ABNORMAL
EGFR: 101 ML/MIN/1.73M2 — SIGNIFICANT CHANGE UP
EGFR: 111 ML/MIN/1.73M2 — SIGNIFICANT CHANGE UP
GLUCOSE BLDC GLUCOMTR-MCNC: 111 MG/DL — HIGH (ref 70–99)
GLUCOSE BLDC GLUCOMTR-MCNC: 132 MG/DL — HIGH (ref 70–99)
GLUCOSE SERPL-MCNC: 144 MG/DL — HIGH (ref 70–99)
GLUCOSE SERPL-MCNC: 524 MG/DL — CRITICAL HIGH (ref 70–99)
GLUCOSE UR QL: NEGATIVE MG/DL — SIGNIFICANT CHANGE UP
HCT VFR BLD CALC: 35.1 % — SIGNIFICANT CHANGE UP (ref 34.5–45)
HGB BLD-MCNC: 11.7 G/DL — SIGNIFICANT CHANGE UP (ref 11.5–15.5)
KETONES UR-MCNC: NEGATIVE MG/DL — SIGNIFICANT CHANGE UP
LEUKOCYTE ESTERASE UR-ACNC: ABNORMAL
MAGNESIUM SERPL-MCNC: 1.8 MG/DL — SIGNIFICANT CHANGE UP (ref 1.6–2.6)
MAGNESIUM SERPL-MCNC: 2 MG/DL — SIGNIFICANT CHANGE UP (ref 1.6–2.6)
MCHC RBC-ENTMCNC: 33.3 GM/DL — SIGNIFICANT CHANGE UP (ref 32–36)
MCHC RBC-ENTMCNC: 34.7 PG — HIGH (ref 27–34)
MCV RBC AUTO: 104.2 FL — HIGH (ref 80–100)
NITRITE UR-MCNC: POSITIVE
NRBC # BLD: 0 /100 WBCS — SIGNIFICANT CHANGE UP (ref 0–0)
PH UR: 6.5 — SIGNIFICANT CHANGE UP (ref 5–8)
PHOSPHATE SERPL-MCNC: 2.3 MG/DL — LOW (ref 2.5–4.5)
PHOSPHATE SERPL-MCNC: 2.7 MG/DL — SIGNIFICANT CHANGE UP (ref 2.5–4.5)
PLATELET # BLD AUTO: 187 K/UL — SIGNIFICANT CHANGE UP (ref 150–400)
POTASSIUM SERPL-MCNC: 3 MMOL/L — LOW (ref 3.5–5.3)
POTASSIUM SERPL-MCNC: 4 MMOL/L — SIGNIFICANT CHANGE UP (ref 3.5–5.3)
POTASSIUM SERPL-SCNC: 3 MMOL/L — LOW (ref 3.5–5.3)
POTASSIUM SERPL-SCNC: 4 MMOL/L — SIGNIFICANT CHANGE UP (ref 3.5–5.3)
PROT UR-MCNC: SIGNIFICANT CHANGE UP MG/DL
RBC # BLD: 3.37 M/UL — LOW (ref 3.8–5.2)
RBC # FLD: 12.6 % — SIGNIFICANT CHANGE UP (ref 10.3–14.5)
RBC CASTS # UR COMP ASSIST: 3 /HPF — SIGNIFICANT CHANGE UP (ref 0–4)
SODIUM SERPL-SCNC: 140 MMOL/L — SIGNIFICANT CHANGE UP (ref 135–145)
SODIUM SERPL-SCNC: 150 MMOL/L — HIGH (ref 135–145)
SODIUM UR-SCNC: 94 MMOL/L — SIGNIFICANT CHANGE UP
SP GR SPEC: 1.03 — SIGNIFICANT CHANGE UP (ref 1–1.03)
SQUAMOUS # UR AUTO: 1 /HPF — SIGNIFICANT CHANGE UP (ref 0–5)
UROBILINOGEN FLD QL: 1 MG/DL — SIGNIFICANT CHANGE UP (ref 0.2–1)
WBC # BLD: 9.37 K/UL — SIGNIFICANT CHANGE UP (ref 3.8–10.5)
WBC # FLD AUTO: 9.37 K/UL — SIGNIFICANT CHANGE UP (ref 3.8–10.5)
WBC UR QL: 136 /HPF — HIGH (ref 0–5)

## 2024-01-23 PROCEDURE — 74176 CT ABD & PELVIS W/O CONTRAST: CPT | Mod: 26

## 2024-01-23 PROCEDURE — 71045 X-RAY EXAM CHEST 1 VIEW: CPT | Mod: 26

## 2024-01-23 PROCEDURE — 95720 EEG PHY/QHP EA INCR W/VEEG: CPT

## 2024-01-23 PROCEDURE — 99222 1ST HOSP IP/OBS MODERATE 55: CPT

## 2024-01-23 RX ORDER — SENNA PLUS 8.6 MG/1
2 TABLET ORAL AT BEDTIME
Refills: 0 | Status: DISCONTINUED | OUTPATIENT
Start: 2024-01-23 | End: 2024-01-31

## 2024-01-23 RX ORDER — POLYETHYLENE GLYCOL 3350 17 G/17G
17 POWDER, FOR SOLUTION ORAL DAILY
Refills: 0 | Status: DISCONTINUED | OUTPATIENT
Start: 2024-01-23 | End: 2024-01-26

## 2024-01-23 RX ORDER — SODIUM CHLORIDE 9 MG/ML
1000 INJECTION, SOLUTION INTRAVENOUS
Refills: 0 | Status: DISCONTINUED | OUTPATIENT
Start: 2024-01-23 | End: 2024-01-31

## 2024-01-23 RX ORDER — SODIUM CHLORIDE 9 MG/ML
1000 INJECTION, SOLUTION INTRAVENOUS
Refills: 0 | Status: DISCONTINUED | OUTPATIENT
Start: 2024-01-23 | End: 2024-01-27

## 2024-01-23 RX ORDER — FERROUS SULFATE 325(65) MG
325 TABLET ORAL DAILY
Refills: 0 | Status: DISCONTINUED | OUTPATIENT
Start: 2024-01-23 | End: 2024-01-31

## 2024-01-23 RX ORDER — INSULIN LISPRO 100/ML
VIAL (ML) SUBCUTANEOUS EVERY 6 HOURS
Refills: 0 | Status: DISCONTINUED | OUTPATIENT
Start: 2024-01-23 | End: 2024-01-25

## 2024-01-23 RX ORDER — CEFTRIAXONE 500 MG/1
1000 INJECTION, POWDER, FOR SOLUTION INTRAMUSCULAR; INTRAVENOUS EVERY 24 HOURS
Refills: 0 | Status: COMPLETED | OUTPATIENT
Start: 2024-01-23 | End: 2024-01-25

## 2024-01-23 RX ORDER — DEXTROSE 50 % IN WATER 50 %
25 SYRINGE (ML) INTRAVENOUS ONCE
Refills: 0 | Status: DISCONTINUED | OUTPATIENT
Start: 2024-01-23 | End: 2024-01-31

## 2024-01-23 RX ORDER — DONEPEZIL HYDROCHLORIDE 10 MG/1
1 TABLET, FILM COATED ORAL
Qty: 0 | Refills: 6 | DISCHARGE

## 2024-01-23 RX ORDER — ACETAMINOPHEN 500 MG
700 TABLET ORAL ONCE
Refills: 0 | Status: COMPLETED | OUTPATIENT
Start: 2024-01-23 | End: 2024-01-23

## 2024-01-23 RX ORDER — AZELASTINE HCL 0.05 %
1 DROPS OPHTHALMIC (EYE)
Qty: 0 | Refills: 3 | DISCHARGE

## 2024-01-23 RX ORDER — ARIPIPRAZOLE 15 MG/1
1 TABLET ORAL
Qty: 0 | Refills: 1 | DISCHARGE

## 2024-01-23 RX ORDER — DEXTROSE 50 % IN WATER 50 %
15 SYRINGE (ML) INTRAVENOUS ONCE
Refills: 0 | Status: DISCONTINUED | OUTPATIENT
Start: 2024-01-23 | End: 2024-01-31

## 2024-01-23 RX ORDER — DEXTROSE 50 % IN WATER 50 %
12.5 SYRINGE (ML) INTRAVENOUS ONCE
Refills: 0 | Status: DISCONTINUED | OUTPATIENT
Start: 2024-01-23 | End: 2024-01-31

## 2024-01-23 RX ORDER — LEVETIRACETAM 250 MG/1
250 TABLET, FILM COATED ORAL ONCE
Refills: 0 | Status: DISCONTINUED | OUTPATIENT
Start: 2024-01-24 | End: 2024-01-24

## 2024-01-23 RX ORDER — HYDROXYZINE HCL 10 MG
1 TABLET ORAL
Qty: 0 | Refills: 2 | DISCHARGE

## 2024-01-23 RX ORDER — LEVETIRACETAM 250 MG/1
500 TABLET, FILM COATED ORAL ONCE
Refills: 0 | Status: DISCONTINUED | OUTPATIENT
Start: 2024-01-23 | End: 2024-01-23

## 2024-01-23 RX ORDER — GLUCAGON INJECTION, SOLUTION 0.5 MG/.1ML
1 INJECTION, SOLUTION SUBCUTANEOUS ONCE
Refills: 0 | Status: DISCONTINUED | OUTPATIENT
Start: 2024-01-23 | End: 2024-01-31

## 2024-01-23 RX ORDER — FLUTICASONE PROPIONATE 50 MCG
1 SPRAY, SUSPENSION NASAL
Qty: 0 | Refills: 0 | DISCHARGE

## 2024-01-23 RX ADMIN — LEVETIRACETAM 1000 MILLIGRAM(S): 250 TABLET, FILM COATED ORAL at 09:44

## 2024-01-23 RX ADMIN — ENOXAPARIN SODIUM 40 MILLIGRAM(S): 100 INJECTION SUBCUTANEOUS at 05:14

## 2024-01-23 RX ADMIN — Medication 700 MILLIGRAM(S): at 15:46

## 2024-01-23 RX ADMIN — Medication 2 MILLIGRAM(S): at 09:33

## 2024-01-23 RX ADMIN — SODIUM CHLORIDE 60 MILLILITER(S): 9 INJECTION, SOLUTION INTRAVENOUS at 10:11

## 2024-01-23 RX ADMIN — Medication 280 MILLIGRAM(S): at 15:16

## 2024-01-23 RX ADMIN — LACOSAMIDE 120 MILLIGRAM(S): 50 TABLET ORAL at 17:51

## 2024-01-23 RX ADMIN — LEVETIRACETAM 750 MILLIGRAM(S): 250 TABLET, FILM COATED ORAL at 05:13

## 2024-01-23 RX ADMIN — LACOSAMIDE 120 MILLIGRAM(S): 50 TABLET ORAL at 05:13

## 2024-01-23 RX ADMIN — LEVETIRACETAM 500 MILLIGRAM(S): 250 TABLET, FILM COATED ORAL at 17:51

## 2024-01-23 RX ADMIN — CEFTRIAXONE 100 MILLIGRAM(S): 500 INJECTION, POWDER, FOR SOLUTION INTRAMUSCULAR; INTRAVENOUS at 10:10

## 2024-01-23 NOTE — DIETITIAN INITIAL EVALUATION ADULT - ENERGY INTAKE
NPO - Noted pt NPO x 2 days. Pending speech language pathologist consult for formal swallow evaluation to determine appropriate diet texture/consistency.

## 2024-01-23 NOTE — DIETITIAN INITIAL EVALUATION ADULT - ADD RECOMMEND
[X] Advanced diet as medically feasible (consider Regular Diet); Defer texture/consistency to Speech Language Pathologist prn.          - Monitor electrolytes and replete as able.   [X] Consider adding multivitamin once daily for micronutrient coverage, pending no medical contraindications.   [X] RD will continue to monitor PO intake, GI tolerance, weight trends, skin integrity, BMs, labs/electrolytes prn.   RD remains available upon request.

## 2024-01-23 NOTE — DIETITIAN INITIAL EVALUATION ADULT - OTHER CALCULATIONS
Used current dosing wt of 47.2 kg (1/23) for caloric/protein needs in consideration of age, hx of epilepsy.   Defer fluid needs to team.

## 2024-01-23 NOTE — DIETITIAN INITIAL EVALUATION ADULT - ORAL NUTRITION SUPPLEMENTS
Add Ensure High Protein Shake (Provides 16 g PRO, 160 kcal per serving) 1x/day to optimize PO intake.

## 2024-01-23 NOTE — CONSULT NOTE ADULT - SUBJECTIVE AND OBJECTIVE BOX
01-23-24 @ 19:15  42 y/o female with PMH of Down syndrome, dementia, nonverbal, decreased mobility, epilepsy who presents for elective EMU admission.    First event was GTC in Oct 2022, patient was seen at [facility] at the time. EEG (10/21/22) No seizures recorded, but noted to have with L>R frontal spikes. CTH non con was normal. She was started on VPA while inpatient. Patient was transitioned to Keppra in outpatient setting by Dr. Sourav Cardenas.  Patient was taking Keppra 750mg BID, still having seizures per family- did not tolerate increase. Patient now follows with Dr. Baum, Vimpat 100mg BID was  added.     Per family, seizure frequency is increasing, however unclear if events are true seizures or behavioral/ agitation related. EMU admission was recommended for event characterization and medication adjustment.     Patient seen at  in 12/18/23 for increased seizure frequency, treated for UTI and kept on same dose ASMs.     Home ASM's   Morning-Keppra 750 mg, Vimpat 100 mg   Afternoon-Vimpat 100 mg   Evening-Keppra 750 mg, Trazodone 100 mg     Of note, had seizure in admitting, code blue was called, given IV 10 mg versed and brought to ED. In the ED, had another seizure, given IV 2 mg ativan and IV 1g keppra with resolution.  (22 Jan 2024 16:40)      PAST MEDICAL & SURGICAL HISTORY:  Down syndrome      Cataract      Epilepsy      H/O tubal ligation          Review of Systems:   UTO    Allergies    No Known Allergies    Intolerances        Social History:     FAMILY HISTORY:      MEDICATIONS  (STANDING):  cefTRIAXone   IVPB 1000 milliGRAM(s) IV Intermittent every 24 hours  dextrose 5% + sodium chloride 0.45%. 1000 milliLiter(s) (60 mL/Hr) IV Continuous <Continuous>  dextrose 5%. 1000 milliLiter(s) (100 mL/Hr) IV Continuous <Continuous>  dextrose 5%. 1000 milliLiter(s) (50 mL/Hr) IV Continuous <Continuous>  dextrose 50% Injectable 25 Gram(s) IV Push once  dextrose 50% Injectable 25 Gram(s) IV Push once  dextrose 50% Injectable 12.5 Gram(s) IV Push once  enoxaparin Injectable 40 milliGRAM(s) SubCutaneous every 24 hours  ferrous    sulfate 325 milliGRAM(s) Oral daily  glucagon  Injectable 1 milliGRAM(s) IntraMuscular once  influenza   Vaccine 0.5 milliLiter(s) IntraMuscular once  insulin lispro (ADMELOG) corrective regimen sliding scale   SubCutaneous every 6 hours  lacosamide IVPB 100 milliGRAM(s) IV Intermittent every 12 hours  polyethylene glycol 3350 17 Gram(s) Oral daily  senna 2 Tablet(s) Oral at bedtime  traZODone 100 milliGRAM(s) Oral at bedtime    MEDICATIONS  (PRN):  dextrose Oral Gel 15 Gram(s) Oral once PRN Blood Glucose LESS THAN 70 milliGRAM(s)/deciliter      Vital Signs Last 24 Hrs  T(C): 37.8 (23 Jan 2024 17:50), Max: 38.1 (23 Jan 2024 12:55)  T(F): 100.1 (23 Jan 2024 17:50), Max: 100.6 (23 Jan 2024 12:55)  HR: 72 (23 Jan 2024 17:05) (60 - 105)  BP: 90/50 (23 Jan 2024 17:05) (88/49 - 129/76)  BP(mean): --  RR: 18 (23 Jan 2024 17:05) (18 - 19)  SpO2: 98% (23 Jan 2024 17:05) (94% - 100%)    Parameters below as of 23 Jan 2024 17:05  Patient On (Oxygen Delivery Method): room air      CAPILLARY BLOOD GLUCOSE      POCT Blood Glucose.: 132 mg/dL (23 Jan 2024 17:33)  POCT Blood Glucose.: 111 mg/dL (23 Jan 2024 11:04)    I&O's Summary      PHYSICAL EXAM:  GENERAL: NAD, well-developed  HEAD:  Atraumatic, Normocephalic  EYES: EOMI, PERRLA, conjunctiva and sclera clear  NECK: Supple, No JVD  CHEST/LUNG: Clear to auscultation bilaterally; No wheeze  HEART: Regular rate and rhythm; No murmurs, rubs, or gallops  ABDOMEN: Soft, Nontender, Nondistended; Bowel sounds present  EXTREMITIES:  2+ Peripheral Pulses, No clubbing, cyanosis, or edema  Physical Exam: Neurological Exam:  Mental Status: eyes closed, no verbal output, does not follow simple/complex commands   Cranial Nerves:   PERR, EOMI, blink to threat intact b/l.  No facial asymmetry.  tongue midline.  oculocephalic intact, corneal intact    Motor:   Tone: increased              Strength:     Does not participate in motor testing. 1/5 throughout                  Dysmetria: did not participate     Sensation: withdraws x 4     Gait: ANGELINA given c/f safety  SKIN: No rashes or lesions    LABS:                        11.7   9.37  )-----------( 187      ( 23 Jan 2024 07:38 )             35.1     01-23    150<H>  |  111<H>  |  11  ----------------------------<  144<H>  4.0   |  26  |  0.75    Ca    8.3<L>      23 Jan 2024 08:52  Phos  2.7     01-23  Mg     2.0     01-23    TPro  8.0  /  Alb  3.8  /  TBili  0.5  /  DBili  x   /  AST  49<H>  /  ALT  28  /  AlkPhos  90  01-22    PT/INR - ( 22 Jan 2024 16:44 )   PT: 11.5 sec;   INR: 1.10 ratio         PTT - ( 22 Jan 2024 16:44 )  PTT:27.7 sec      Urinalysis Basic - ( 23 Jan 2024 08:52 )    Color: x / Appearance: x / SG: x / pH: x  Gluc: 144 mg/dL / Ketone: x  / Bili: x / Urobili: x   Blood: x / Protein: x / Nitrite: x   Leuk Esterase: x / RBC: x / WBC x   Sq Epi: x / Non Sq Epi: x / Bacteria: x        RADIOLOGY & ADDITIONAL TESTS:    Imaging Personally Reviewed:    Consultant(s) Notes Reviewed:      Care Discussed with Consultants/Other Providers:   01-23-24 @ 19:15  42 y/o female with PMH of Down syndrome, dementia, nonverbal, decreased mobility, epilepsy who presents for elective EMU admission.    First event was GTC in Oct 2022, patient was seen at [facility] at the time. EEG (10/21/22) No seizures recorded, but noted to have with L>R frontal spikes. CTH non con was normal. She was started on VPA while inpatient. Patient was transitioned to Keppra in outpatient setting by Dr. Sourav Cardenas.  Patient was taking Keppra 750mg BID, still having seizures per family- did not tolerate increase. Patient now follows with Dr. Baum, Vimpat 100mg BID was  added.     Per family, seizure frequency is increasing, however unclear if events are true seizures or behavioral/ agitation related. EMU admission was recommended for event characterization and medication adjustment.       Patient seen at  in 12/18/23 for increased seizure frequency, treated for UTI and kept on same dose ASMs.     Home ASM's   Morning-Keppra 750 mg, Vimpat 100 mg   Afternoon-Vimpat 100 mg   Evening-Keppra 750 mg, Trazodone 100 mg     Of note, had seizure in admitting, code blue was called, given IV 10 mg versed and brought to ED. In the ED, had another seizure, given IV 2 mg ativan and IV 1g keppra with resolution.  (22 Jan 2024 16:40)    - as per mother patient is normally functional, ambulatory, goes to school , feeds herself and is verbal  PAST MEDICAL & SURGICAL HISTORY:  Down syndrome      Cataract      Epilepsy      H/O tubal ligation          Review of Systems:   UTO    Allergies    No Known Allergies    Intolerances        Social History:     FAMILY HISTORY:      MEDICATIONS  (STANDING):  cefTRIAXone   IVPB 1000 milliGRAM(s) IV Intermittent every 24 hours  dextrose 5% + sodium chloride 0.45%. 1000 milliLiter(s) (60 mL/Hr) IV Continuous <Continuous>  dextrose 5%. 1000 milliLiter(s) (100 mL/Hr) IV Continuous <Continuous>  dextrose 5%. 1000 milliLiter(s) (50 mL/Hr) IV Continuous <Continuous>  dextrose 50% Injectable 25 Gram(s) IV Push once  dextrose 50% Injectable 25 Gram(s) IV Push once  dextrose 50% Injectable 12.5 Gram(s) IV Push once  enoxaparin Injectable 40 milliGRAM(s) SubCutaneous every 24 hours  ferrous    sulfate 325 milliGRAM(s) Oral daily  glucagon  Injectable 1 milliGRAM(s) IntraMuscular once  influenza   Vaccine 0.5 milliLiter(s) IntraMuscular once  insulin lispro (ADMELOG) corrective regimen sliding scale   SubCutaneous every 6 hours  lacosamide IVPB 100 milliGRAM(s) IV Intermittent every 12 hours  polyethylene glycol 3350 17 Gram(s) Oral daily  senna 2 Tablet(s) Oral at bedtime  traZODone 100 milliGRAM(s) Oral at bedtime    MEDICATIONS  (PRN):  dextrose Oral Gel 15 Gram(s) Oral once PRN Blood Glucose LESS THAN 70 milliGRAM(s)/deciliter      Vital Signs Last 24 Hrs  T(C): 37.8 (23 Jan 2024 17:50), Max: 38.1 (23 Jan 2024 12:55)  T(F): 100.1 (23 Jan 2024 17:50), Max: 100.6 (23 Jan 2024 12:55)  HR: 72 (23 Jan 2024 17:05) (60 - 105)  BP: 90/50 (23 Jan 2024 17:05) (88/49 - 129/76)  BP(mean): --  RR: 18 (23 Jan 2024 17:05) (18 - 19)  SpO2: 98% (23 Jan 2024 17:05) (94% - 100%)    Parameters below as of 23 Jan 2024 17:05  Patient On (Oxygen Delivery Method): room air      CAPILLARY BLOOD GLUCOSE      POCT Blood Glucose.: 132 mg/dL (23 Jan 2024 17:33)  POCT Blood Glucose.: 111 mg/dL (23 Jan 2024 11:04)    I&O's Summary      PHYSICAL EXAM:  GENERAL: NAD, well-developed  HEAD:  Atraumatic, Normocephalic  EYES: EOMI, PERRLA, conjunctiva and sclera clear  NECK: Supple, No JVD  CHEST/LUNG: Clear to auscultation bilaterally; No wheeze  HEART: Regular rate and rhythm; No murmurs, rubs, or gallops  ABDOMEN: Soft, Nontender, Nondistended; Bowel sounds present  EXTREMITIES:  2+ Peripheral Pulses, No clubbing, cyanosis, or edema  Physical Exam: Neurological Exam:  Mental Status: eyes closed, no verbal output, does not follow simple/complex commands   Cranial Nerves:   PERR, EOMI, blink to threat intact b/l.  No facial asymmetry.  tongue midline.  oculocephalic intact, corneal intact    Motor:   Tone: increased              Strength:     Does not participate in motor testing. 1/5 throughout                  Dysmetria: did not participate     Sensation: withdraws x 4     Gait: ANGELINA given c/f safety  SKIN: No rashes or lesions    LABS:                        11.7   9.37  )-----------( 187      ( 23 Jan 2024 07:38 )             35.1     01-23    150<H>  |  111<H>  |  11  ----------------------------<  144<H>  4.0   |  26  |  0.75    Ca    8.3<L>      23 Jan 2024 08:52  Phos  2.7     01-23  Mg     2.0     01-23    TPro  8.0  /  Alb  3.8  /  TBili  0.5  /  DBili  x   /  AST  49<H>  /  ALT  28  /  AlkPhos  90  01-22    PT/INR - ( 22 Jan 2024 16:44 )   PT: 11.5 sec;   INR: 1.10 ratio         PTT - ( 22 Jan 2024 16:44 )  PTT:27.7 sec      Urinalysis Basic - ( 23 Jan 2024 08:52 )    Color: x / Appearance: x / SG: x / pH: x  Gluc: 144 mg/dL / Ketone: x  / Bili: x / Urobili: x   Blood: x / Protein: x / Nitrite: x   Leuk Esterase: x / RBC: x / WBC x   Sq Epi: x / Non Sq Epi: x / Bacteria: x        RADIOLOGY & ADDITIONAL TESTS:    Imaging Personally Reviewed:    Consultant(s) Notes Reviewed:      Care Discussed with Consultants/Other Providers:

## 2024-01-23 NOTE — DIETITIAN INITIAL EVALUATION ADULT - REASON INDICATOR FOR ASSESSMENT
RD Consult Indicated for: MST Score 2 or >; Nutrition Support Team; Nutrition Assessment   Source: Team, Electronic Medical Record   Chart reviewed, events noted.  RD Consult Indicated for: MST Score 2 or >; Nutrition Support Team; Nutrition Assessment   Source: Team, Electronic Medical Record; Utilized  Services (German) Louann #[id number]  Chart reviewed, events noted.

## 2024-01-23 NOTE — DIETITIAN INITIAL EVALUATION ADULT - PERTINENT MEDS FT
MEDICATIONS  (STANDING):  cefTRIAXone   IVPB 1000 milliGRAM(s) IV Intermittent every 24 hours  dextrose 5% + sodium chloride 0.45%. 1000 milliLiter(s) (60 mL/Hr) IV Continuous <Continuous>  dextrose 5%. 1000 milliLiter(s) (100 mL/Hr) IV Continuous <Continuous>  dextrose 5%. 1000 milliLiter(s) (50 mL/Hr) IV Continuous <Continuous>  dextrose 50% Injectable 25 Gram(s) IV Push once  dextrose 50% Injectable 12.5 Gram(s) IV Push once  dextrose 50% Injectable 25 Gram(s) IV Push once  enoxaparin Injectable 40 milliGRAM(s) SubCutaneous every 24 hours  ferrous    sulfate 325 milliGRAM(s) Oral daily  glucagon  Injectable 1 milliGRAM(s) IntraMuscular once  influenza   Vaccine 0.5 milliLiter(s) IntraMuscular once  insulin lispro (ADMELOG) corrective regimen sliding scale   SubCutaneous every 6 hours  lacosamide IVPB 100 milliGRAM(s) IV Intermittent every 12 hours  levETIRAcetam   Injectable 500 milliGRAM(s) IV Push once  polyethylene glycol 3350 17 Gram(s) Oral daily  senna 2 Tablet(s) Oral at bedtime  traZODone 100 milliGRAM(s) Oral at bedtime    MEDICATIONS  (PRN):  dextrose Oral Gel 15 Gram(s) Oral once PRN Blood Glucose LESS THAN 70 milliGRAM(s)/deciliter  levETIRAcetam   Injectable 1000 milliGRAM(s) IV Push once PRN seizure activity refractory to ativan  LORazepam   Injectable 2 milliGRAM(s) IV Push once PRN seizure activity greater then 3min

## 2024-01-23 NOTE — CHART NOTE - NSCHARTNOTEFT_GEN_A_CORE
notified by staff that patient having abnormal movements. Went to bedside to assess patient and noticed generalized tonic-clonic movements with myoclonic jerking most pronounced in LUE. Eyes closed initially. BP and HR remained stable, varying 90s-120s/60s-70s and 70s-90s, respectively. Patient had oxygen desaturation while on NC to 60s. NRB mask placed with improvement of oxygen to 90s. After 2.5 min of sustained movements, 1mg Ativan given. Movements continued with minimal improvement. 2nd dose of 1mg Ativan given with no improvement. 1g Keppra given with resolution of movements. Eyes opening, regarding examiners, blinking, making volitional movements such as crossing leg. Dr. Bass updated and discussed event. Apparently no EEG correlate, likely behavioral event, further indicated by eye closure. Will continue to monitor. Bedside RN updated.

## 2024-01-23 NOTE — DIETITIAN INITIAL EVALUATION ADULT - PERSON TAUGHT/METHOD
verbal instruction/written material/teach back - (Patient repeats in own words)/mother instructed/father instructed

## 2024-01-23 NOTE — DIETITIAN INITIAL EVALUATION ADULT - COLLABORATION WITH OTHER PROVIDERS
Pending speech language pathologist consult for formal swallow evaluation to confirm appropriate diet texture/consistency prn.

## 2024-01-23 NOTE — DIETITIAN INITIAL EVALUATION ADULT - PERTINENT LABORATORY DATA
01-23    150<H>  |  111<H>  |  11  ----------------------------<  144<H>  4.0   |  26  |  0.75    Ca    8.3<L>      23 Jan 2024 08:52  Phos  2.7     01-23  Mg     2.0     01-23    TPro  8.0  /  Alb  3.8  /  TBili  0.5  /  DBili  x   /  AST  49<H>  /  ALT  28  /  AlkPhos  90  01-22  POCT Blood Glucose.: 111 mg/dL (01-23-24 @ 11:04)

## 2024-01-23 NOTE — DIETITIAN INITIAL EVALUATION ADULT - OTHER INFO
Cardiac:    Endo:  - RD will continue to monitor blood glucose levels prn.     Renal:  - RD will continue to monitor electrolytes prn.    Wt Hx:  - Reports UBW of kg. Per chart, dosing wt of 47.2 kg (1/22).   - Wt hx in kg ([facility]) as follows: . RD will continue to monitor weight trends as available/able.   - IBW: 44.3 kg (based on ht of 59 in)   - Hx of epilepsy.   Endo:  - RD will continue to monitor blood glucose levels prn.     Renal:  - RD will continue to monitor electrolytes prn.    Wt Hx:  - Reports UBW of kg. Per chart, dosing wt of 47.2 kg (1/22).   - Wt hx in kg ([facility]) as follows: . RD will continue to monitor weight trends as available/able.   - IBW: 44.3 kg (based on ht of 59 in)   - Hx of epilepsy. Noted pt with seizures this AM.  - IVF: D5+NS @ 60 mL/hr. Noted abnormal blood glucose level today (1/23) - ? accuracy. RD will continue to monitor blood glucose levels prn.   - Noted hypokalemia (1/23) and hypophosphatemia (1/23). Monitor electrolytes and replete as able.   - Ordered for abx.     Wt Hx:  - Reports UBW between 40.9 and 43.2kg; endorses pt has been weight stable. Per chart, dosing wt of 47.2 kg (1/22).   - No wt hx available in [facility] at this time. RD will continue to monitor weight trends as available/able.   - IBW: 40.9 kg (based on report ht per family: 56 in)

## 2024-01-23 NOTE — DIETITIAN INITIAL EVALUATION ADULT - ORAL INTAKE PTA/DIET HISTORY
Unable to obtain pt's subjective dietary hx at this time. RD will obtain information as medically appropriate/able. Per H&P, pt takes multivitamin, ferrous sulfate supplementation PTA. Per chart, no known food allergies/food intolerances documented. Per prior speech language pathologist note (10/24/22), pt with hx of chewing/swallowing difficulties (Soft and bite sized); noted pt with hx of vomiting after solid food consumption. Noted pt on bowel regimen at home: Senna, Miralax.  Per family (at bedside), pt PTA: Reports good PO intake/appetite; consumes 3 balanced meals/day with snacks in between (fruit). Endorses no protein supplementation; reports micronutrient supplementation: multivitamin, vitamin C, vitamin D supplementation PTA. Per chart, no known food allergies/food intolerances documented. Per prior speech language pathologist note (10/24/22), pt with hx of chewing/swallowing difficulties - family confirm pt is still consuming soft and bite sized textured foods, thin liquids. Endorses pt with constipation PTA; noted pt on bowel regimen at home: Senna, Miralax.

## 2024-01-23 NOTE — SWALLOW BEDSIDE ASSESSMENT ADULT - COMMENTS
Swallow History: pt seen for bedside swallow evaluation 10/2022 and recommendations at that time included Soft & Bite Sized solids w/ thin liquids.

## 2024-01-23 NOTE — DIETITIAN INITIAL EVALUATION ADULT - EDUCATION DIETARY MODIFICATIONS
Discussed healthful nutrition therapy education; reinforced the importance of consuming a balanced diet of carbohydrates, protein, fat; encouraged fiber rich food items in setting of constipation; discussed the benefits of oral nutrition supplementation in periods of decreased PO intake; emphasized the importance of small, frequent nutrient dense meals and protein at meals for skin integrity/muscle mass/weight maintenance. Family requested education handouts; RD provided. Made aware RD remains available upon request for further diet education./teach back/(2) meets goals/outcomes/verbalization

## 2024-01-23 NOTE — EEG REPORT - NS EEG TEXT BOX
Patient Name: Indu Wilkinson    Age: 43 year, : 1980  MRN #: - [id number]  [place]: [address] -  Referring Physician: -  Dr. Baum      -------------------------------------------------------------------------------------------------------------------------------------------------------  STUDY INFORMATION:    EEG Recording Technique:  The patient underwent continuous Video-EEG monitoring, using Telemetry System hardware on the XLTek Digital System. EEG and video data were stored on a computer hard drive with important events saved in digital archive files. The material was reviewed by a physician (electroencephalographer / epileptologist) on a daily basis. Dawit and seizure detection algorithms were utilized and reviewed. An EEG Technician attended to the patient, and was available throughout daytime work hours.  The epilepsy center neurologist was available in person or on call 24-hours per day.    EEG Placement and Labeling of Electrodes:  The EEG was performed utilizing 20 channel referential EEG connections (coronal over temporal over parasagittal montage) using all standard 10-20 electrode placements with EKG, with additional electrodes placed in the inferior temporal region using the modified 10-10 montage electrode placements for elective admissions, or if deemed necessary. Recording was at a sampling rate of 256 samples per second per channel. Time synchronized digital video recording was done simultaneously with EEG recording. A low light infrared camera was used for low light recording.     -------------------------------------------------------------------------------------------------------------------------------------------------------  HISTORY:  44 y/o female with PMH of Down syndrome, dementia, nonverbal, decreased mobility, epilepsy who presents for elective EMU admission. Per family, seizure frequency is increasing, however unclear if events are true seizures or behavioral/ agitation related.    Home Antiepileptic Medication and Device  Morning-Keppra 750 mg, Vimpat 100 mg   Afternoon-Vimpat 100 mg   Evening-Keppra 750 mg, Trazodone 100 mg    -------------------------------------------------------------------------------------------------------------------------------------------------------  INTERPRETATION:    DAY 1 	START: 2024  6:31:22 PM     	END: 2024  08:00 AM  	DURATION: 13 HR  16 MIN    DAILY EEG VISUAL ANALYSIS    The background was continuous, symmetric, spontaneously variable and reactive. During wakefulness, the posterior dominant rhythm was absent.    BACKGROUND SLOWING:  Diffuse polymorphic delta and theta activity was present.    FOCAL SLOWING:   None was present.    SLEEP BACKGROUND:  Drowsiness was characterized by fragmentation, attenuation, and slowing of the background activity.    No stage 2 sleep architecture was seen.    OTHER NON-EPILEPTIFORM FINDINGS:  None were present.    ACTIVATION PROCEDURES:   Hyperventilation was not performed.    Photic stimulation was not performed.    INTERICTAL EPILEPTIFORM ACTIVITY:   Frequent bifrontal sharp waves, max F3/F4, often asymmetrical favoring left > right, at times in couple secs periodic bursts up to 1.5 hz.    EVENTS:  1 patient event button press for whole body tremulousness. On video review patient also seems to be somewhat tense/irritable and at times possibly crying. This episode lasts at least for about 30 mins. There was no correlating EEG abnormality.    There were also events of 2-3 secs right arm/hand flapping movements noted during awake state observed on video review without correlating EEG abnormality.    ARTIFACTS:  Intermittent myogenic and movement artifacts were noted.    ECG:  The heart rate on single channel ECG was predominantly between 70-80 BPM.    ASMs:   Morning-Keppra 750 mg, Vimpat 100 mg   Afternoon-Vimpat 100 mg   Evening-Keppra 750 mg, Trazodone 100 mg     -------------------------------------------------------------------------------------------------------------------------------------------------------  EEG SUMMARY:  Abnormal EEG in the awake, drowsy and asleep states.  •	Events as above including 1 patient event button press without correlating EEG abnormality.  •	Frequent bifrontal sharp waves, max F3/F4, often asymmetrical favoring left > right, at times in couple secs periodic bursts up to 1.5 hz.  •	Moderate diffuse background slowing.    -------------------------------------------------------------------------------------------------------------------------------------------------------  IMPRESSION/CLINICAL CORRELATE:  This is an abnormal EEG record.   •	Events as above including 1 patient event button press without correlating EEG abnormality.  •	Increased risk for seizures, left frontal predominance of bifrontal discharges suggest likely left frontal focus with rapid synchrony.  •	Moderate diffuse cerebral dysfunction, nonspecific.  -------------------------------------------------------------------------------------------------------------------------------------------------------  Claudia Reed MD  Fellow, [facility]   Patient Name: Indu Wilkinson    Age: 43 year, : 1980  MRN #: - [id number]  [place]: [address] -  Referring Physician: -  Dr. Baum      -------------------------------------------------------------------------------------------------------------------------------------------------------  STUDY INFORMATION:    EEG Recording Technique:  The patient underwent continuous Video-EEG monitoring, using Telemetry System hardware on the XLTek Digital System. EEG and video data were stored on a computer hard drive with important events saved in digital archive files. The material was reviewed by a physician (electroencephalographer / epileptologist) on a daily basis. Dawit and seizure detection algorithms were utilized and reviewed. An EEG Technician attended to the patient, and was available throughout daytime work hours.  The epilepsy center neurologist was available in person or on call 24-hours per day.    EEG Placement and Labeling of Electrodes:  The EEG was performed utilizing 20 channel referential EEG connections (coronal over temporal over parasagittal montage) using all standard 10-20 electrode placements with EKG, with additional electrodes placed in the inferior temporal region using the modified 10-10 montage electrode placements for elective admissions, or if deemed necessary. Recording was at a sampling rate of 256 samples per second per channel. Time synchronized digital video recording was done simultaneously with EEG recording. A low light infrared camera was used for low light recording.     -------------------------------------------------------------------------------------------------------------------------------------------------------  HISTORY:  42 y/o female with PMH of Down syndrome, dementia, nonverbal, decreased mobility, epilepsy who presents for elective EMU admission. Per family, seizure frequency is increasing, however unclear if events are true seizures or behavioral/ agitation related.    Home Antiepileptic Medication and Device  Morning-Keppra 750 mg, Vimpat 100 mg   Afternoon-Vimpat 100 mg   Evening-Keppra 750 mg, Trazodone 100 mg    -------------------------------------------------------------------------------------------------------------------------------------------------------  INTERPRETATION:    DAY 1 	START: 2024  6:31:22 PM     	END: 2024  08:00 AM  	DURATION: 13 HR  16 MIN    DAILY EEG VISUAL ANALYSIS    The background was continuous, symmetric, spontaneously variable and reactive. During wakefulness, the posterior dominant rhythm was absent.    BACKGROUND SLOWING:  Diffuse polymorphic delta and theta activity was present.    FOCAL SLOWING:   None was present.    SLEEP BACKGROUND:  Drowsiness was characterized by fragmentation, attenuation, and slowing of the background activity.    No stage 2 sleep architecture was seen.    OTHER NON-EPILEPTIFORM FINDINGS:  None were present.    ACTIVATION PROCEDURES:   Hyperventilation was not performed.    Photic stimulation was not performed.    INTERICTAL EPILEPTIFORM ACTIVITY:   Frequent bifrontal sharp waves, max F3/F4, often asymmetrical favoring left > right, at times in couple secs periodic bursts up to 1.5 hz.    EVENTS:  1 patient event button press for whole body tremulousness. On video review patient also seems to be somewhat tense/irritable and at times possibly crying. This episode lasts at least for about 30 mins. There was no correlating EEG abnormality.    There were also events of 2-3 secs right arm/hand flapping movements noted during awake state observed on video review without correlating EEG abnormality.    ARTIFACTS:  Intermittent myogenic and movement artifacts were noted.    ECG:  The heart rate on single channel ECG was predominantly between 70-80 BPM.    ASMs:   Morning-Keppra 750 mg, Vimpat 100 mg   Afternoon-Vimpat 100 mg   Evening-Keppra 750 mg, Trazodone 100 mg     -------------------------------------------------------------------------------------------------------------------------------------------------------  EEG SUMMARY:  Abnormal EEG in the awake, drowsy and asleep states.  •	Events as above including 1 patient event button press without correlating EEG abnormality.  •	Frequent bifrontal sharp waves, max F3/F4, often asymmetrical favoring left > right, at times in couple secs periodic bursts up to 1.5 hz.  •	Moderate diffuse background slowing.    -------------------------------------------------------------------------------------------------------------------------------------------------------  IMPRESSION/CLINICAL CORRELATE:  This is an abnormal EEG record.   •	Events as above including 1 patient event button press without correlating EEG abnormality.  •	Increased risk for seizures, left frontal predominance of bifrontal discharges suggest likely left frontal focus with rapid synchrony.  •	Moderate diffuse cerebral dysfunction, nonspecific.  -------------------------------------------------------------------------------------------------------------------------------------------------------  Claudia Reed MD  Fellow, [facility]    Robert Cote MD PhD  Director, Epilepsy Division, [facility] EEG Reading Room Ph#: [phone]  Epilepsy Answering Service after 5PM and before 8:30AM: Ph#: [phone]

## 2024-01-24 LAB
A1C WITH ESTIMATED AVERAGE GLUCOSE RESULT: 4.9 % — SIGNIFICANT CHANGE UP (ref 4–5.6)
ALBUMIN SERPL ELPH-MCNC: 3.2 G/DL — LOW (ref 3.3–5)
ALP SERPL-CCNC: 71 U/L — SIGNIFICANT CHANGE UP (ref 40–120)
ALT FLD-CCNC: 15 U/L — SIGNIFICANT CHANGE UP (ref 10–45)
ANION GAP SERPL CALC-SCNC: 10 MMOL/L — SIGNIFICANT CHANGE UP (ref 5–17)
AST SERPL-CCNC: 21 U/L — SIGNIFICANT CHANGE UP (ref 10–40)
BILIRUB SERPL-MCNC: 0.5 MG/DL — SIGNIFICANT CHANGE UP (ref 0.2–1.2)
BUN SERPL-MCNC: 9 MG/DL — SIGNIFICANT CHANGE UP (ref 7–23)
CALCIUM SERPL-MCNC: 8.3 MG/DL — LOW (ref 8.4–10.5)
CHLORIDE SERPL-SCNC: 109 MMOL/L — HIGH (ref 96–108)
CO2 SERPL-SCNC: 27 MMOL/L — SIGNIFICANT CHANGE UP (ref 22–31)
CREAT SERPL-MCNC: 0.7 MG/DL — SIGNIFICANT CHANGE UP (ref 0.5–1.3)
E COLI DNA BLD POS QL NAA+NON-PROBE: SIGNIFICANT CHANGE UP
EGFR: 110 ML/MIN/1.73M2 — SIGNIFICANT CHANGE UP
ESTIMATED AVERAGE GLUCOSE: 94 MG/DL — SIGNIFICANT CHANGE UP (ref 68–114)
GLUCOSE BLDC GLUCOMTR-MCNC: 111 MG/DL — HIGH (ref 70–99)
GLUCOSE BLDC GLUCOMTR-MCNC: 116 MG/DL — HIGH (ref 70–99)
GLUCOSE BLDC GLUCOMTR-MCNC: 121 MG/DL — HIGH (ref 70–99)
GLUCOSE BLDC GLUCOMTR-MCNC: 136 MG/DL — HIGH (ref 70–99)
GLUCOSE BLDC GLUCOMTR-MCNC: 99 MG/DL — SIGNIFICANT CHANGE UP (ref 70–99)
GLUCOSE SERPL-MCNC: 137 MG/DL — HIGH (ref 70–99)
GRAM STN FLD: ABNORMAL
HCT VFR BLD CALC: 34.6 % — SIGNIFICANT CHANGE UP (ref 34.5–45)
HGB BLD-MCNC: 11.4 G/DL — LOW (ref 11.5–15.5)
LACOSAMIDE (VIMPAT) RESULT: 6.32 UG/ML — SIGNIFICANT CHANGE UP (ref 1–10)
MAGNESIUM SERPL-MCNC: 2 MG/DL — SIGNIFICANT CHANGE UP (ref 1.6–2.6)
MCHC RBC-ENTMCNC: 32.9 GM/DL — SIGNIFICANT CHANGE UP (ref 32–36)
MCHC RBC-ENTMCNC: 34.3 PG — HIGH (ref 27–34)
MCV RBC AUTO: 104.2 FL — HIGH (ref 80–100)
METHOD TYPE: SIGNIFICANT CHANGE UP
NRBC # BLD: 0 /100 WBCS — SIGNIFICANT CHANGE UP (ref 0–0)
PHOSPHATE SERPL-MCNC: 2.8 MG/DL — SIGNIFICANT CHANGE UP (ref 2.5–4.5)
PLATELET # BLD AUTO: 163 K/UL — SIGNIFICANT CHANGE UP (ref 150–400)
POTASSIUM SERPL-MCNC: 3.5 MMOL/L — SIGNIFICANT CHANGE UP (ref 3.5–5.3)
POTASSIUM SERPL-SCNC: 3.5 MMOL/L — SIGNIFICANT CHANGE UP (ref 3.5–5.3)
PROT SERPL-MCNC: 6.2 G/DL — SIGNIFICANT CHANGE UP (ref 6–8.3)
RBC # BLD: 3.32 M/UL — LOW (ref 3.8–5.2)
RBC # FLD: 12.4 % — SIGNIFICANT CHANGE UP (ref 10.3–14.5)
SODIUM SERPL-SCNC: 146 MMOL/L — HIGH (ref 135–145)
SPECIMEN SOURCE: SIGNIFICANT CHANGE UP
SPECIMEN SOURCE: SIGNIFICANT CHANGE UP
WBC # BLD: 7.57 K/UL — SIGNIFICANT CHANGE UP (ref 3.8–10.5)
WBC # FLD AUTO: 7.57 K/UL — SIGNIFICANT CHANGE UP (ref 3.8–10.5)

## 2024-01-24 PROCEDURE — 99222 1ST HOSP IP/OBS MODERATE 55: CPT

## 2024-01-24 PROCEDURE — 99232 SBSQ HOSP IP/OBS MODERATE 35: CPT

## 2024-01-24 PROCEDURE — 95720 EEG PHY/QHP EA INCR W/VEEG: CPT

## 2024-01-24 RX ADMIN — CEFTRIAXONE 100 MILLIGRAM(S): 500 INJECTION, POWDER, FOR SOLUTION INTRAMUSCULAR; INTRAVENOUS at 08:33

## 2024-01-24 RX ADMIN — ENOXAPARIN SODIUM 40 MILLIGRAM(S): 100 INJECTION SUBCUTANEOUS at 05:08

## 2024-01-24 RX ADMIN — LACOSAMIDE 120 MILLIGRAM(S): 50 TABLET ORAL at 17:25

## 2024-01-24 RX ADMIN — Medication 100 MILLIGRAM(S): at 21:29

## 2024-01-24 RX ADMIN — LACOSAMIDE 120 MILLIGRAM(S): 50 TABLET ORAL at 05:08

## 2024-01-24 RX ADMIN — LEVETIRACETAM 250 MILLIGRAM(S): 250 TABLET, FILM COATED ORAL at 05:09

## 2024-01-24 NOTE — PROGRESS NOTE ADULT - ASSESSMENT
ASSESSMENT: 42 y/o female with PMH of Down syndrome, dementia, nonverbal, decreased mobility, and epilepsy who presents for elective EMU admission for increased seizure frequency. CODE BLUE in admitting for seizure activity, s/p 10mg of midazolam, 2mg of ativan, and keppra 1g. EEG report with increased risk for seizures left > right, but no seizure activity recorded. Episodes appear to primarily be behavioral. Tapered off home Keppra and continued on home vimpat. UA +, blood culture with gram neg rods. Ucx pending result. Febrile, no leukocytosis. Started on CTX. ID and IM consulted for reccs.     Impression: Myoclonic jerks with O2 desaturation with concern for seizures. EEG negative thus far for seizures, but patient at high risk for seizures. UA +     Plan:     1. Seizures   [x] continue to monitor on vEEG   [x] Monitor off home Keppra   [x] c/w home vimpat 100mg bid  [x] C/w home trazodone 100 mg  [x] Rescue: 1st line: IV 1 mg Ativan, 2nd line IV Keppra 1g  [x] Seizure and fall precautions   [x] q 4 hour neuro and vital sign checks     2. UTI, regors, possible urosepsis   [x] CTX x 3 days   [x] UA + on 1/23  [x] blood culture + for gram neg rods, will f/u sensitives  [] urine culture pending   [] ID consult pending   [x] IM consult, reccs appreciated  [x] febrile- s/p IV tylenol   [x] CT abdomen/pelvis showing acute cystitis   [x] daily CBC to trend WBC count     3. Dysphagia  [x] failed swallow eval at admission    [] pending swallow eval w/ speech therapy   [x] c/w IVF for hydration     CORE MEASURES:        AED levels [x] Sent [] Pending [] Resulted     LFTs [] normal [x] elevated      Plan and education provided to [x] patient [x]family at bedside / over the phone    Seizure Semiology  [x] Tonic clonic  [] Clonic  [x] Tonic  [] Unresponsive  [] Focal with impaired awareness  [] Focal without impaired awareness    Obtain screening lower extremity venous ultrasound in patients who meet 1 or more of the following criteria as patient is high risk for DVT/PE on admission:   [] History of DVT/PE  []Hypercoagulable states (Factor V Leiden, Cancer, OCP, etc. )  []Prolonged immobility (hemiplegia/hemiparesis/post operative or any other extended immobilization)  [] Transferred from outside facility (Rehab or Long term care)   ASSESSMENT: 42 y/o female with PMH of Down syndrome, dementia, nonverbal, decreased mobility, and epilepsy who presents for elective EMU admission for increased seizure frequency. CODE BLUE in admitting for seizure activity, s/p 10mg of midazolam, 2mg of ativan, and keppra 1g. EEG report with increased risk for seizures left > right, but no seizure activity recorded. Episodes appear to primarily be behavioral. Tapered off home Keppra and continued on home vimpat. UA +, blood culture positive with gram neg rods. Ucx pending result. Febrile, no leukocytosis. Started on CTX. ID and IM consulted for reccs.     Impression: Myoclonic jerks with O2 desaturation with concern for seizures. EEG negative thus far for seizures, but patient at high risk for seizures. UA +     Plan:     1. Seizures   [x] continue to monitor on vEEG   [x] Monitor off home Keppra   [x] c/w home vimpat 100mg bid  [] vimpat and keppra levels pending   [x] C/w home trazodone 100 mg  [x] Rescue: 1st line: IV 1 mg Ativan, 2nd line IV Keppra 1g  [x] Seizure and fall precautions   [x] q 4 hour neuro and vital sign checks     2. UTI, regors, possible urosepsis   [x] CTX x 3 days   [x] UA + on 1/23  [x] blood culture + for gram neg rods, will f/u sensitives  [] urine culture pending   [] ID consult pending   [x] IM consult, reccs appreciated  [x] febrile- s/p IV tylenol   [x] CT abdomen/pelvis showing acute cystitis   [x] daily CBC to trend WBC count     3. Dysphagia  [x] failed swallow eval at admission    [] pending swallow eval w/ speech therapy   [x] c/w IVF for hydration     4. Hypernatremia  [] continue to monitor BMP daily  [x] c/w IVF d5 + .45% NS      CORE MEASURES:        AED levels [x] Sent [] Pending [] Resulted     LFTs [] normal [x] elevated      Plan and education provided to [x] patient [x]family at bedside / over the phone    Seizure Semiology  [x] Tonic clonic  [] Clonic  [x] Tonic  [] Unresponsive  [] Focal with impaired awareness  [] Focal without impaired awareness    Obtain screening lower extremity venous ultrasound in patients who meet 1 or more of the following criteria as patient is high risk for DVT/PE on admission:   [] History of DVT/PE  []Hypercoagulable states (Factor V Leiden, Cancer, OCP, etc. )  []Prolonged immobility (hemiplegia/hemiparesis/post operative or any other extended immobilization)  [] Transferred from outside facility (Rehab or Long term care)   ASSESSMENT: 44 y/o female with PMH of Down syndrome, dementia, nonverbal, decreased mobility, and epilepsy who presents for elective EMU admission for increased seizure frequency. CODE BLUE in admitting for seizure activity, s/p 10mg of midazolam, 2mg of ativan, and keppra 1g. EEG report with increased risk for seizures left > right, but no seizure activity recorded. Episodes appear to primarily be behavioral. Tapered off home Keppra and continued on home vimpat. UA +, blood culture positive with gram neg rods. Ucx pending result. Febrile, no leukocytosis. Started on CTX. ID and IM consulted for reccs.     Impression: Myoclonic jerks with O2 desaturation with concern for seizures. EEG negative thus far for seizures, but patient at high risk for seizures. UA +     Plan:   1. Seizures   [x] continue to monitor on vEEG   [x] Monitor off home Keppra   [x] c/w home vimpat 100mg bid  [] vimpat and keppra levels pending   [x] Rescue: 1st line: IV 1 mg Ativan, 2nd line IV Keppra 1g  [x] Maintain seizure and fall precautions   [x] q 4 hour neuro and vital sign checks     2. UTI, regors, possible urosepsis   [x] CTX for UTI (1/23- )  [x] UA + on 1/23   [x] blood culture + for gram neg rods, will f/u sensitives  [] urine culture pending   [] ID consult pending   [x] IM consult, reccs pending   [x] febrile- s/p IV tylenol   [x] CT abdomen/pelvis showing acute cystitis   [x] daily CBC to trend WBC count     3. Dysphagia  [x] failed swallow eval at admission    [] pending swallow eval w/ speech therapy   [x] c/w IVF for hydration     4. Hypernatremia  [] continue to monitor BMP daily  [x] c/w IVF d5 + .45% NS      CORE MEASURES:        AED levels [x] Sent [] Pending [] Resulted     LFTs [] normal [x] elevated      Plan and education provided to [x] patient [x]family at bedside / over the phone    Seizure Semiology  [x] Tonic clonic  [] Clonic  [x] Tonic  [] Unresponsive  [] Focal with impaired awareness  [] Focal without impaired awareness    Obtain screening lower extremity venous ultrasound in patients who meet 1 or more of the following criteria as patient is high risk for DVT/PE on admission:   [] History of DVT/PE  []Hypercoagulable states (Factor V Leiden, Cancer, OCP, etc. )  []Prolonged immobility (hemiplegia/hemiparesis/post operative or any other extended immobilization)  [] Transferred from outside facility (Rehab or Long term care)

## 2024-01-24 NOTE — PROGRESS NOTE ADULT - SUBJECTIVE AND OBJECTIVE BOX
THE PATIENT WAS SEEN AND EXAMINED BY ME WITH THE HOUSESTAFF DURING MORNING ROUNDS.   HPI: 44 y/o female with PMH of Down syndrome, dementia, nonverbal, decreased mobility, epilepsy who presents for elective EMU admission.  First event was GTC in Oct 2022, patient was seen at [facility] at the time. EEG (10/21/22) No seizures recorded, but noted to have with L>R frontal spikes. CTH non con was normal. She was started on VPA while inpatient. Patient was transitioned to Keppra in outpatient setting by Dr. Sourav Cardenas.  Patient was taking Keppra 750mg BID, still having seizures per family- did not tolerate increase. Patient now follows with Dr. Baum, Vimpat 100mg BID was  added.   Per family, seizure frequency is increasing, however unclear if events are true seizures or behavioral/ agitation related. EMU admission was recommended for event characterization and medication adjustment.   Patient seen at  in 12/18/23 for increased seizure frequency, treated for UTI and kept on same dose ASMs.     Home ASM's   Morning-Keppra 750 mg, Vimpat 100 mg   Afternoon-Vimpat 100 mg   Evening-Keppra 750 mg, Trazodone 100 mg     SUBJECTIVE: Afebrile overnight.  No new neurologic complaints.  ROS: unable to be obtained due to patient's mental status.      cefTRIAXone   IVPB 1000 milliGRAM(s) IV Intermittent every 24 hours  enoxaparin Injectable 40 milliGRAM(s) SubCutaneous every 24 hours  ferrous    sulfate 325 milliGRAM(s) Oral daily  glucagon  Injectable 1 milliGRAM(s) IntraMuscular once  influenza   Vaccine 0.5 milliLiter(s) IntraMuscular once  insulin lispro (ADMELOG) corrective regimen sliding scale   SubCutaneous every 6 hours  lacosamide IVPB 100 milliGRAM(s) IV Intermittent every 12 hours  polyethylene glycol 3350 17 Gram(s) Oral daily  senna 2 Tablet(s) Oral at bedtime  traZODone 100 milliGRAM(s) Oral at bedtime      Physical Exam: Neurological Exam:  Mental Status: eyes closed, no verbal output, does not follow simple/complex commands   Cranial Nerves:   PERR, EOMI, blink to threat intact b/l.  No facial asymmetry.  tongue midline.  oculocephalic intact, corneal intact  Motor: Tone: increased              Strength: does not participate in motor testing. 1/5 throughout   Dysmetria: did not participate   Sensation: withdraws x 4       LABS:                        11.4   7.57  )-----------( 163      ( 24 Jan 2024 06:11 )             34.6    01-24    146<H>  |  109<H>  |  9   ----------------------------<  137<H>  3.5   |  27  |  0.70    Ca    8.3<L>      24 Jan 2024 06:11  Phos  2.8     01-24  Mg     2.0     01-24    TPro  6.2  /  Alb  3.2<L>  /  TBili  0.5  /  DBili  x   /  AST  21  /  ALT  15  /  AlkPhos  71  01-24  PT/INR - ( 22 Jan 2024 16:44 )   PT: 11.5 sec;   INR: 1.10 ratio         PTT - ( 22 Jan 2024 16:44 )  PTT:27.7 sec     COVID-19 PCR: NotDetec (22 Jan 2024 18:53)      IMAGING:     CT Abdomen and Pelvis No Cont (01.23.24)  Limited noncontrast study.  Circumferential bladder wall thickening and perivesicular fat stranding   suggestive of acute cystitis.  Trace free pelvic fluid.      EEG report 1/23/2023  Abnormal EEG in the awake, drowsy and asleep states.  •Events as above including 1 patient event button press without correlating EEG abnormality.  •Frequent bifrontal sharp waves, max F3/F4, often asymmetrical favoring left > right, at times in couple secs periodic bursts up to 1.5 hz.  •Moderate diffuse background slowing.    IMPRESSION/CLINICAL CORRELATE:  This is an abnormal EEG record.   •Events as above including 1 patient event button press without correlating EEG abnormality.  •Increased risk for seizures, left frontal predominance of bifrontal discharges suggest likely left frontal focus with rapid synchrony.  •Moderate diffuse cerebral dysfunction, nonspecific.   THE PATIENT WAS SEEN AND EXAMINED BY ME WITH THE HOUSESTAFF DURING MORNING ROUNDS.   HPI: 44 y/o female with PMH of Down syndrome, dementia, nonverbal, decreased mobility, epilepsy who presents for elective EMU admission.  First event was GTC in Oct 2022, patient was seen at [facility] at the time. EEG (10/21/22) No seizures recorded, but noted to have with L>R frontal spikes. CTH non con was normal. She was started on VPA while inpatient. Patient was transitioned to Keppra in outpatient setting by Dr. Sourav Cardenas.  Patient was taking Keppra 750mg BID, still having seizures per family- did not tolerate increase. Patient now follows with Dr. Baum, Vimpat 100mg BID was  added.   Per family, seizure frequency is increasing, however unclear if events are true seizures or behavioral/ agitation related. EMU admission was recommended for event characterization and medication adjustment.   Patient seen at  in 12/18/23 for increased seizure frequency, treated for UTI and kept on same dose ASMs.     Home ASM's   Morning-Keppra 750 mg, Vimpat 100 mg   Afternoon-Vimpat 100 mg   Evening-Keppra 750 mg, Trazodone 100 mg     SUBJECTIVE: Afebrile overnight.  No new neurologic complaints.  ROS: unable to be obtained due to patient's mental status.      cefTRIAXone   IVPB 1000 milliGRAM(s) IV Intermittent every 24 hours  enoxaparin Injectable 40 milliGRAM(s) SubCutaneous every 24 hours  ferrous    sulfate 325 milliGRAM(s) Oral daily  glucagon  Injectable 1 milliGRAM(s) IntraMuscular once  influenza   Vaccine 0.5 milliLiter(s) IntraMuscular once  insulin lispro (ADMELOG) corrective regimen sliding scale   SubCutaneous every 6 hours  lacosamide IVPB 100 milliGRAM(s) IV Intermittent every 12 hours  polyethylene glycol 3350 17 Gram(s) Oral daily  senna 2 Tablet(s) Oral at bedtime  traZODone 100 milliGRAM(s) Oral at bedtime      Physical Exam: Neurological Exam:  Mental Status: eyes open, alert, tracks examiner, no verbal output, does not follow simple or complex commands   Cranial Nerves: PERR, EOMI, blink to threat intact b/l. No facial droop. Tongue midline. Oculocephalic intact, corneal intact  Motor: Tone: increased. Strength: does not participate in motor testing. 1/5 throughout   Dysmetria: did not participate   Sensation: withdraws x 4 to all extremities        LABS:                        11.4   7.57  )-----------( 163      ( 24 Jan 2024 06:11 )             34.6    01-24    146<H>  |  109<H>  |  9   ----------------------------<  137<H>  3.5   |  27  |  0.70    Ca    8.3<L>      24 Jan 2024 06:11  Phos  2.8     01-24  Mg     2.0     01-24    TPro  6.2  /  Alb  3.2<L>  /  TBili  0.5  /  DBili  x   /  AST  21  /  ALT  15  /  AlkPhos  71  01-24  PT/INR - ( 22 Jan 2024 16:44 )   PT: 11.5 sec;   INR: 1.10 ratio         PTT - ( 22 Jan 2024 16:44 )  PTT:27.7 sec     COVID-19 PCR: NotDetec (22 Jan 2024 18:53)      IMAGING:     CT Abdomen and Pelvis No Cont (01.23.24)  Limited noncontrast study.  Circumferential bladder wall thickening and perivesicular fat stranding   suggestive of acute cystitis.  Trace free pelvic fluid.      EEG report 1/23/2023  Abnormal EEG in the awake, drowsy and asleep states.  •Events as above including 1 patient event button press without correlating EEG abnormality.  •Frequent bifrontal sharp waves, max F3/F4, often asymmetrical favoring left > right, at times in couple secs periodic bursts up to 1.5 hz.  •Moderate diffuse background slowing.    IMPRESSION/CLINICAL CORRELATE:  This is an abnormal EEG record.   •Events as above including 1 patient event button press without correlating EEG abnormality.  •Increased risk for seizures, left frontal predominance of bifrontal discharges suggest likely left frontal focus with rapid synchrony.  •Moderate diffuse cerebral dysfunction, nonspecific.   THE PATIENT WAS SEEN AND EXAMINED BY ME WITH THE HOUSESTAFF DURING MORNING ROUNDS.   HPI: 44 y/o female with PMH of Down syndrome, dementia, nonverbal, decreased mobility, epilepsy who presents for elective EMU admission.  First event was GTC in Oct 2022, patient was seen at [facility] at the time. EEG (10/21/22) No seizures recorded, but noted to have with L>R frontal spikes. CTH non con was normal. She was started on VPA while inpatient. Patient was transitioned to Keppra in outpatient setting by Dr. Sourav Cardenas.  Patient was taking Keppra 750mg BID, still having seizures per family- did not tolerate increase. Patient now follows with Dr. Baum, Vimpat 100mg BID was  added.   Per family, seizure frequency is increasing, however unclear if events are true seizures or behavioral/ agitation related. EMU admission was recommended for event characterization and medication adjustment.   Patient seen at  in 12/18/23 for increased seizure frequency, treated for UTI and kept on same dose ASMs.     Home ASM's   Morning-Keppra 750 mg, Vimpat 100 mg   Afternoon-Vimpat 100 mg   Evening-Keppra 750 mg, Trazodone 100 mg     SUBJECTIVE: Afebrile overnight.  No new neurologic complaints.  ROS: unable to be obtained due to patient's mental status.      cefTRIAXone   IVPB 1000 milliGRAM(s) IV Intermittent every 24 hours  enoxaparin Injectable 40 milliGRAM(s) SubCutaneous every 24 hours  ferrous    sulfate 325 milliGRAM(s) Oral daily  glucagon  Injectable 1 milliGRAM(s) IntraMuscular once  influenza   Vaccine 0.5 milliLiter(s) IntraMuscular once  insulin lispro (ADMELOG) corrective regimen sliding scale   SubCutaneous every 6 hours  lacosamide IVPB 100 milliGRAM(s) IV Intermittent every 12 hours  polyethylene glycol 3350 17 Gram(s) Oral daily  senna 2 Tablet(s) Oral at bedtime  traZODone 100 milliGRAM(s) Oral at bedtime      Physical Exam: Neurological Exam:  Mental Status: eyes open, alert, tracks examiner, no verbal output, does not follow simple or complex commands   Cranial Nerves: PERR, EOMI, blink to threat intact b/l. No facial droop. Tongue midline. Oculocephalic intact, corneal intact  Motor: Tone: increased. Strength: limited exam. 2/5 in UE and 1/5 in LE    Dysmetria: did not participate   Sensation: withdraws x 4 to all extremities        LABS:                        11.4   7.57  )-----------( 163      ( 24 Jan 2024 06:11 )             34.6    01-24    146<H>  |  109<H>  |  9   ----------------------------<  137<H>  3.5   |  27  |  0.70    Ca    8.3<L>      24 Jan 2024 06:11  Phos  2.8     01-24  Mg     2.0     01-24    TPro  6.2  /  Alb  3.2<L>  /  TBili  0.5  /  DBili  x   /  AST  21  /  ALT  15  /  AlkPhos  71  01-24  PT/INR - ( 22 Jan 2024 16:44 )   PT: 11.5 sec;   INR: 1.10 ratio         PTT - ( 22 Jan 2024 16:44 )  PTT:27.7 sec     COVID-19 PCR: NotDetec (22 Jan 2024 18:53)      IMAGING:     CT Abdomen and Pelvis No Cont (01.23.24)  Limited noncontrast study.  Circumferential bladder wall thickening and perivesicular fat stranding   suggestive of acute cystitis.  Trace free pelvic fluid.      EEG report 1/23/2023  Abnormal EEG in the awake, drowsy and asleep states.  •Events as above including 1 patient event button press without correlating EEG abnormality.  •Frequent bifrontal sharp waves, max F3/F4, often asymmetrical favoring left > right, at times in couple secs periodic bursts up to 1.5 hz.  •Moderate diffuse background slowing.    IMPRESSION/CLINICAL CORRELATE:  This is an abnormal EEG record.   •Events as above including 1 patient event button press without correlating EEG abnormality.  •Increased risk for seizures, left frontal predominance of bifrontal discharges suggest likely left frontal focus with rapid synchrony.  •Moderate diffuse cerebral dysfunction, nonspecific.

## 2024-01-24 NOTE — SWALLOW BEDSIDE ASSESSMENT ADULT - ORAL PHASE
oral holding of all material, tactile stimulation provided (firm pressure midline to tongue, cold spoon to lips and tongue)/Delayed oral transit time

## 2024-01-24 NOTE — SWALLOW BEDSIDE ASSESSMENT ADULT - COMMENTS
Continued hospital course: Continued hospital course: Of note, had seizure in admitting, code blue was called, given IV 10 mg versed and brought to ED. In the ED, had another seizure, given IV 2 mg ativan and IV 1g keppra with resolution.  Impression: Myoclonic jerks with O2 desaturation with concern for seizures. EEG negative thus far for seizures, but patient at high risk for seizures.   UA +   Bedside swallow evaluation attempted 1/23, however exam deferred as patient seizing.   ID following-> found to have UTI and ecoli bacteremia. on abx.   On 1/24-> pt transferred to medicine service.   EEG negative for seizure activity.     CT ABD-> IMPRESSIONS: Bladder wall thickening raising concern for cystitis. Please correlate clinically. Limited evaluation for pyelonephritis on this noncontrast CT scan. Minimal soft tissue stranding adjacent to the lower pole of the right kidney was not seen previously and is nonspecific. Clinical correlation is suggested. Mild fluid in the cul-de-sac not seen previously is nonspecific in this presumed articulating woman.  Minimal right pleural fluid not seen previously. Mild groundglass opacities at the lung bases was noted on the prior exam.    Swallow History: pt seen for bedside swallow evaluation 10/2022 and recommendations at that time included Soft & Bite Sized solids w/ thin liquids.

## 2024-01-24 NOTE — PROVIDER CONTACT NOTE (CRITICAL VALUE NOTIFICATION) - SITUATION
Patient blood glucose value 524
Pt blood cultures from 1/23 returned positive. Aerobic bottle showed gram neg. rods.

## 2024-01-24 NOTE — EEG REPORT - NS EEG TEXT BOX
DAY 2 	START: 1/23/2024  08:00 AM     	END: 1/24/2024  08:00 AM (interrupted)  	DURATION: 20 HR 54 MIN     DAILY EEG VISUAL ANALYSIS    The background was continuous, symmetric, spontaneously variable and reactive. During wakefulness, the posterior dominant rhythm was absent.    BACKGROUND SLOWING:  Diffuse polymorphic delta and theta activity was present.    FOCAL SLOWING:   None was present.    SLEEP BACKGROUND:  Drowsiness was characterized by fragmentation, attenuation, and slowing of the background activity.    No stage 2 sleep architecture was seen.    OTHER NON-EPILEPTIFORM FINDINGS:  None were present.    ACTIVATION PROCEDURES:   Hyperventilation was not performed.    Photic stimulation was not performed.    INTERICTAL EPILEPTIFORM ACTIVITY:   Frequent bifrontal sharp waves, max F3/F4, often asymmetrical favoring left > right    EVENTS:  2 patient event button press for whole body tremulousness similar to previous day without correlating EEG abnormality.    ARTIFACTS:  Intermittent myogenic and movement artifacts were noted.    ECG:  The heart rate on single channel ECG was predominantly between 70-80 BPM.    ASMs:   Discontinued Keppra  Continue Vimpat    -------------------------------------------------------------------------------------------------------------------------------------------------------  EEG SUMMARY:  Abnormal EEG in the awake, drowsy and asleep states.  •	Events as above without correlating EEG abnormality.  •	Frequent bifrontal sharp waves, max F3/F4, often asymmetrical favoring left > right.  •	Moderate diffuse background slowing.    -------------------------------------------------------------------------------------------------------------------------------------------------------  IMPRESSION/CLINICAL CORRELATE:  This is an abnormal EEG record.   •	Events as above including multiple patient event button press without correlating EEG abnormality.  •	Increased risk for seizures, left frontal predominance of bifrontal discharges suggest likely left frontal focus with rapid synchrony.  •	Moderate diffuse cerebral dysfunction, nonspecific.  -------------------------------------------------------------------------------------------------------------------------------------------------------  Claudia Reed MD  Fellow, [facility] [place]

## 2024-01-24 NOTE — PROGRESS NOTE ADULT - SUBJECTIVE AND OBJECTIVE BOX
DATE OF SERVICE: 01-24-24 @ 15:33    Patient is a 43y old  Female who presents with a chief complaint of EMU admission (24 Jan 2024 10:22)      SUBJECTIVE / OVERNIGHT EVENTS:  nonverbal, does not follow commands    MEDICATIONS  (STANDING):  cefTRIAXone   IVPB 1000 milliGRAM(s) IV Intermittent every 24 hours  dextrose 5% + sodium chloride 0.45%. 1000 milliLiter(s) (60 mL/Hr) IV Continuous <Continuous>  dextrose 5%. 1000 milliLiter(s) (100 mL/Hr) IV Continuous <Continuous>  dextrose 5%. 1000 milliLiter(s) (50 mL/Hr) IV Continuous <Continuous>  dextrose 50% Injectable 25 Gram(s) IV Push once  dextrose 50% Injectable 12.5 Gram(s) IV Push once  dextrose 50% Injectable 25 Gram(s) IV Push once  enoxaparin Injectable 40 milliGRAM(s) SubCutaneous every 24 hours  ferrous    sulfate 325 milliGRAM(s) Oral daily  glucagon  Injectable 1 milliGRAM(s) IntraMuscular once  influenza   Vaccine 0.5 milliLiter(s) IntraMuscular once  insulin lispro (ADMELOG) corrective regimen sliding scale   SubCutaneous every 6 hours  lacosamide IVPB 100 milliGRAM(s) IV Intermittent every 12 hours  polyethylene glycol 3350 17 Gram(s) Oral daily  senna 2 Tablet(s) Oral at bedtime  traZODone 100 milliGRAM(s) Oral at bedtime    MEDICATIONS  (PRN):  dextrose Oral Gel 15 Gram(s) Oral once PRN Blood Glucose LESS THAN 70 milliGRAM(s)/deciliter      Vital Signs Last 24 Hrs  T(C): 37 (24 Jan 2024 12:12), Max: 37.8 (23 Jan 2024 17:50)  T(F): 98.6 (24 Jan 2024 12:12), Max: 100.1 (23 Jan 2024 17:50)  HR: 65 (24 Jan 2024 12:12) (61 - 79)  BP: 94/58 (24 Jan 2024 12:12) (90/50 - 106/68)  BP(mean): --  RR: 15 (24 Jan 2024 12:12) (15 - 18)  SpO2: 100% (24 Jan 2024 12:12) (97% - 100%)    Parameters below as of 24 Jan 2024 12:12  Patient On (Oxygen Delivery Method): room air      CAPILLARY BLOOD GLUCOSE      POCT Blood Glucose.: 111 mg/dL (24 Jan 2024 12:10)  POCT Blood Glucose.: 99 mg/dL (24 Jan 2024 05:12)  POCT Blood Glucose.: 116 mg/dL (24 Jan 2024 00:22)  POCT Blood Glucose.: 132 mg/dL (23 Jan 2024 17:33)    I&O's Summary    23 Jan 2024 07:01  -  24 Jan 2024 07:00  --------------------------------------------------------  IN: 0 mL / OUT: 350 mL / NET: -350 mL    24 Jan 2024 07:01  -  24 Jan 2024 15:33  --------------------------------------------------------  IN: 0 mL / OUT: 600 mL / NET: -600 mL        PHYSICAL EXAM:  GENERAL: NAD, well-developed  HEAD:  Atraumatic, Normocephalic  EYES: EOMI, PERRLA, conjunctiva and sclera clear  NECK: Supple, No JVD  CHEST/LUNG: Clear to auscultation bilaterally; No wheeze  HEART: Regular rate and rhythm; No murmurs, rubs, or gallops  ABDOMEN: Soft, Nontender, Nondistended; Bowel sounds present  EXTREMITIES:  2+ Peripheral Pulses, No clubbing, cyanosis, or edema  PSYCH: AAOx3  NEUROLOGY: non-focal  SKIN: No rashes or lesions    LABS:                        11.4   7.57  )-----------( 163      ( 24 Jan 2024 06:11 )             34.6     01-24    146<H>  |  109<H>  |  9   ----------------------------<  137<H>  3.5   |  27  |  0.70    Ca    8.3<L>      24 Jan 2024 06:11  Phos  2.8     01-24  Mg     2.0     01-24    TPro  6.2  /  Alb  3.2<L>  /  TBili  0.5  /  DBili  x   /  AST  21  /  ALT  15  /  AlkPhos  71  01-24    PT/INR - ( 22 Jan 2024 16:44 )   PT: 11.5 sec;   INR: 1.10 ratio         PTT - ( 22 Jan 2024 16:44 )  PTT:27.7 sec      Urinalysis Basic - ( 24 Jan 2024 06:11 )    Color: x / Appearance: x / SG: x / pH: x  Gluc: 137 mg/dL / Ketone: x  / Bili: x / Urobili: x   Blood: x / Protein: x / Nitrite: x   Leuk Esterase: x / RBC: x / WBC x   Sq Epi: x / Non Sq Epi: x / Bacteria: x    < from: CT Abdomen and Pelvis No Cont (01.23.24 @ 20:32) >  IMPRESSION:  Bladder wall thickening raising concern for cystitis. Please correlate   clinically. Limited evaluation for pyelonephritis on this noncontrast CT   scan. Minimal soft tissue stranding adjacent to the lower pole of the   right kidney was not seen previously and is nonspecific. Clinical   correlation is suggested.    Mild fluid in the cul-de-sac not seen previously is nonspecific in this   presumed articulating woman.    Minimal right pleural fluid not seen previously. Mild groundglass   opacities at the lung bases was noted on the prior exam.      < end of copied text >      RADIOLOGY & ADDITIONAL TESTS:    Imaging Personally Reviewed:    Consultant(s) Notes Reviewed:      Care Discussed with Consultants/Other Providers:

## 2024-01-24 NOTE — SWALLOW BEDSIDE ASSESSMENT ADULT - SLP GENERAL OBSERVATIONS
pt found in bed, awake and alert. initially smiling and giggling. pt is non-verbal, does not follow commands, eye contact is fleeting. as exam progressed, patient appeared to fatigue and appeared lethargic.

## 2024-01-24 NOTE — CONSULT NOTE ADULT - ASSESSMENT
44 y/o female with PMH of Down syndrome, dementia, nonverbal, decreased mobility, epilepsy, recent admission in 10.2023 for multifocal pna, who presents for elective EMU admission. Pt now found to have UTI and ecoli bacteremia and ID consulted.    Afebrile  Initial WBC  in ED 9.2 with transient elevation to 13 post seizure   Culture - Blood-ecoli bacteremia x 2   ua + 136 wbc + nitrate sm leuks   ecoli UTI(pan sensitive)  neg covid  Currently on ceftriaxone 1g q 24h  imagin/23 CT non con: Bladder wall thickening raising concern for cystitis, No renal stones or hydronephrosis. Minimal perinephric   stranding adjacent to the lower pole the right kidney is nonspecific. Mild groundglass opacities at the lung bases was noted on the prior exam    Plan to be d/w attending 44 y/o female with PMH of Down syndrome, dementia, nonverbal, decreased mobility, epilepsy, recent admission in 10.2023 for multifocal pna, who presents for elective EMU admission. Pt now found to have UTI and ecoli bacteremia and ID consulted.    Afebrile  Initial WBC  in ED 9.2 with transient elevation to 13 post seizure   Culture - Blood-ecoli bacteremia x 2   ua + 136 wbc + nitrate sm leuks   ecoli UTI(pan sensitive)  neg covid  Currently on ceftriaxone 1g q 24h  imagin/23 CT non con: Bladder wall thickening raising concern for cystitis, No renal stones or hydronephrosis. Minimal perinephric   stranding adjacent to the lower pole the right kidney is nonspecific. Mild groundglass opacities at the lung bases was noted on the prior exam    Plan   Ecoli Bacteremia and cystitis  Continue ceftriaxone 1gram q 24h(should be reordered as it is only ordered for 3 days)  Repeat blood cx q 48hours for clearance  Continue to monitor for fevers   Continue to monitor WBC  ID will follow  Plan d/w Dr. Bradley and floor provider

## 2024-01-24 NOTE — SWALLOW BEDSIDE ASSESSMENT ADULT - SWALLOW EVAL: DIAGNOSIS
Consult for bedside swallow evaluation received and appreciated. Chart reviewed. Per team, evaluation to be deferred today as patient having seizures this AM. Will f/u tomorrow.
Pt presents with evidence of an oropharyngeal dysphagia vs behavioral feeding component. Oral holding of all food and liquid material, tactile stimulation required to initiate swallow sequence. Significantly delayed pharyngeal swallows noted. Although no clinical s/s of aspiration, poor participation in swallow evaluation increases risk.

## 2024-01-24 NOTE — PROGRESS NOTE ADULT - ASSESSMENT
42 y/o female with PMH of Down syndrome, dementia, nonverbal, decreased mobility, epilepsy who presents for elective EMU admission.    Epilepsy  - no acute seizures per neurology  - cont vimpat    fever metabolic encephalopathy  - UTI  -  sepsis  - Ecoli Bacteremia and cystitis  - c/w ceftriaxone  - follow up cultures  - ID is following    dysphagia  - swallow eval  - NPO for now    constipation  - senna and miralax    anemia  - iron studies  - c/w iron    dvt px

## 2024-01-24 NOTE — CONSULT NOTE ADULT - NS ATTEND AMEND GEN_ALL_CORE FT
43-year-old female with a past medical history significant for Down syndrome, dementia, nonverbal at baseline, epilepsy who is admitted to the hospital for an elective EMU admission.     Patient with history of seizures, first generalized tonic-clonic seizure noted in October 2022, was admitted to [facility] at the time.  Patient then followed up after discharge with neurology regularly.     Patient's family reports that seizure frequency has been increasing however background is unclear therefore patient was recommended for an EMU admission for further characterization and management.     Patient with recent admission to [facility] in December 2023 under similar circumstances.  During this hospitalization, was treated for multifocal pneumonia with ceftriaxone and azithromycin, urine culture had grown Pseudomonas.     Upon admission, patient suffered seizure resulting in a CODE BLUE and patient given Ativan, Keppra and Versed.  Seizures abated as a result.  Since admission, patient with a Tmax 102.1 on 1/22/2024, fever curve trending better, Tmax 100.6 Fahrenheit past 24 hours.   Latest labs with no leukocytosis, BMP with renal function within normal limits, hepatic function within normal limits.  Urinalysis with evidence for pyuria.  Urine culture with E. coli.  CT abdomen/pelvis shows bladder wall thickening, some soft tissue stranding in the lower pole of the right kidney noted.  Blood cultures obtained on admission with E. coli, negative for resistance gene testing.  Patient currently on ceftriaxone.     #E. coli bacteremia, likely urinary source   #E. coli UTI   #Abnormal abdominal imaging   #Seizure disorder     Recommendations   Continue ceftriaxone 1 g every 24 hours  Obtain repeat blood cultures  Will plan for 7 to 10-day course depending on clinical progress  Follow fever curve and WBC count    Heath Bradley MD  Division of Infectious Diseases

## 2024-01-24 NOTE — CHART NOTE - NSCHARTNOTEFT_GEN_A_CORE
Patient was accepted under medicine service, Dr. Kingsley. Patient signed out over the phone to EMA Choe. All questions and concerns addressed. Please call #[phone] with any questions.

## 2024-01-24 NOTE — CONSULT NOTE ADULT - SUBJECTIVE AND OBJECTIVE BOX
Patient is a 43y old  Female who presents with a chief complaint of EMU admission (24 Jan 2024 07:21)    HPI:  44 y/o female with PMH of Down syndrome, dementia, nonverbal, decreased mobility, epilepsy who presents for elective EMU admission.    First event was GTC in Oct 2022, patient was seen at [facility] at the time. EEG (10/21/22) No seizures recorded, but noted to have with L>R frontal spikes. CTH non con was normal. She was started on VPA while inpatient. Patient was transitioned to Keppra in outpatient setting by Dr. Sourav Cardenas.  Patient was taking Keppra 750mg BID, still having seizures per family- did not tolerate increase. Patient now follows with Dr. Baum, Vimpat 100mg BID was  added.     Per family, seizure frequency is increasing, however unclear if events are true seizures or behavioral/ agitation related. EMU admission was recommended for event characterization and medication adjustment.     Patient seen at  in 12/18/23 for increased seizure frequency, treated for UTI and kept on same dose ASMs.     Home ASM's   Morning-Keppra 750 mg, Vimpat 100 mg   Afternoon-Vimpat 100 mg   Evening-Keppra 750 mg, Trazodone 100 mg     Of note, had seizure in admitting, code blue was called, given IV 10 mg versed and brought to ED. In the ED, had another seizure, given IV 2 mg ativan and IV 1g keppra with resolution.  (22 Jan 2024 16:40)     REVIEW OF SYSTEMS  [  ] ROS unobtainable because:    [ x ] All other systems negative except as noted below  Constitutional:  [ ] fever [ ] chills  [ ] weight loss  [ ]night sweat  [ ]poor appetite/PO intake [ ]fatigue   Skin:  [ ] rash [ ] phlebitis	  Eyes: [ ] icterus [ ] pain  [ ] discharge	  ENMT: [ ] sore throat  [ ] thrush [ ] ulcers [ ] exudates [ ]anosmia  Respiratory: [ ] dyspnea [ ] hemoptysis [ ] cough [ ] sputum	  Cardiovascular:  [ ] chest pain [ ] palpitations [ ] edema	  Gastrointestinal:  [ ] nausea [ ] vomiting [ ] diarrhea [ ] constipation [ ] pain	  Genitourinary:  [ ] dysuria [ ] frequency [ ] hematuria [ ] discharge [ ] flank pain  [ ] incontinence  Musculoskeletal:  [ ] myalgias [ ] arthralgias [ ] arthritis  [ ] back pain  Neurological:  [ ] headache [ ] weakness [ ] seizures  [ ] confusion/altered mental status  prior hospital charts reviewed [V]  primary team notes reviewed [V]  other consultant notes reviewed [V]    PAST MEDICAL & SURGICAL HISTORY:  Down syndrome      Cataract      Epilepsy      H/O tubal ligation          SOCIAL HISTORY:  - Denied smoking/vaping/alcohol/recreational drug use    FAMILY HISTORY:      Allergies  No Known Allergies        ANTIMICROBIALS:  cefTRIAXone   IVPB 1000 every 24 hours      ANTIMICROBIALS (past 90 days):  MEDICATIONS  (STANDING):  cefTRIAXone   IVPB   100 mL/Hr IV Intermittent (01-24-24 @ 08:33)   100 mL/Hr IV Intermittent (01-23-24 @ 10:10)        OTHER MEDS:   MEDICATIONS  (STANDING):  dextrose 50% Injectable 25 once  dextrose 50% Injectable 12.5 once  dextrose 50% Injectable 25 once  dextrose Oral Gel 15 once PRN  enoxaparin Injectable 40 every 24 hours  glucagon  Injectable 1 once  influenza   Vaccine 0.5 once  insulin lispro (ADMELOG) corrective regimen sliding scale  every 6 hours  lacosamide IVPB 100 every 12 hours  polyethylene glycol 3350 17 daily  senna 2 at bedtime  traZODone 100 at bedtime      VITALS:  Vital Signs Last 24 Hrs  T(F): 98.1 (01-24-24 @ 08:49), Max: 102.1 (01-22-24 @ 18:15)    Vital Signs Last 24 Hrs  HR: 67 (01-24-24 @ 08:49) (61 - 105)  BP: 97/61 (01-24-24 @ 08:49) (90/50 - 127/78)  RR: 15 (01-24-24 @ 08:49)  SpO2: 97% (01-24-24 @ 08:49) (97% - 100%)  Wt(kg): --    EXAM:    GA: NAD, AOx3  HEENT: oral cavity no lesion  CV: nl S1/S2, no RMG  Lungs: CTAB, No distress  Abd: BS+, soft, nontender, no rebounding pain  Ext: no edema  Neuro: No focal deficits  Skin: Intact  IV: no phlebitis  Labs:                        11.4   7.57  )-----------( 163      ( 24 Jan 2024 06:11 )             34.6     01-24    146<H>  |  109<H>  |  9   ----------------------------<  137<H>  3.5   |  27  |  0.70    Ca    8.3<L>      24 Jan 2024 06:11  Phos  2.8     01-24  Mg     2.0     01-24    TPro  6.2  /  Alb  3.2<L>  /  TBili  0.5  /  DBili  x   /  AST  21  /  ALT  15  /  AlkPhos  71  01-24    WBC Trend:  WBC Count: 7.57 (01-24-24 @ 06:11)  WBC Count: 9.37 (01-23-24 @ 07:38)  WBC Count: 13.45 (01-22-24 @ 18:15)  WBC Count: 9.24 (01-22-24 @ 16:44)    Auto Neutrophil #: 6.93 K/uL (01-22-24 @ 16:44)  Auto Neutrophil #: 8.05 K/uL (12-18-23 @ 21:50)    Creatine Trend:  Creatinine: 0.70 (01-24)  Creatinine: 0.75 (01-23)  Creatinine: 0.67 (01-23)  Creatinine: 0.98 (01-22)    Liver Biochemical Testing Trend:  Alanine Aminotransferase (ALT/SGPT): 15 (01-24)  Alanine Aminotransferase (ALT/SGPT): 28 (01-22)  Alanine Aminotransferase (ALT/SGPT): 38 (12-18)  Aspartate Aminotransferase (AST/SGOT): 21 (01-24-24 @ 06:11)  Aspartate Aminotransferase (AST/SGOT): 49 (01-22-24 @ 16:44)  Aspartate Aminotransferase (AST/SGOT): 30 (12-18-23 @ 21:50)  Bilirubin Total: 0.5 (01-24)  Bilirubin Total: 0.5 (01-22)  Bilirubin Total: 0.4 (12-18)    Trend LDH    Auto Eosinophil %: 0.1 % (01-22-24 @ 16:44)    Urinalysis Basic - ( 24 Jan 2024 06:11 )    Color: x / Appearance: x / SG: x / pH: x  Gluc: 137 mg/dL / Ketone: x  / Bili: x / Urobili: x   Blood: x / Protein: x / Nitrite: x   Leuk Esterase: x / RBC: x / WBC x   Sq Epi: x / Non Sq Epi: x / Bacteria: x      MICROBIOLOGY:        Culture - Blood (collected 23 Jan 2024 10:40)  Source: .Blood Blood-Venous  Preliminary Report:    Growth in aerobic bottle: Gram Negative Rods    Culture - Blood (collected 23 Jan 2024 10:30)  Source: .Blood Blood  Preliminary Report:    Growth in aerobic bottle: Gram Negative Rods    Direct identification is available within approximately 3-5    hours either by Blood Panel Multiplexed PCR or Direct    MALDI-TOF. Details: [email or web address]  Organism: Blood Culture PCR  Organism: Blood Culture PCR    Sensitivities:      -  Escherichia coli: Detec      Method Type: PCR    Culture - Urine (collected 19 Dec 2023 02:08)  Source: Clean Catch Clean Catch (Midstream)  Final Report:    >100,000 CFU/ml Escherichia coli  Organism: Escherichia coli  Organism: Escherichia coli    Sensitivities:      -  Levofloxacin: S <=0.5      -  Tobramycin: S <=2      -  Nitrofurantoin: S <=32 Should not be used to treat pyelonephritis      -  Aztreonam: S <=4      -  Gentamicin: S <=2      -  Cefazolin: S <=2 For uncomplicated UTI with K. pneumoniae, E. coli, or P. mirablis: MATHEUS <=16 is sensitive and MATHEUS >=32 is resistant. This also predicts results for oral agents cefaclor, cefdinir, cefpodoxime, cefprozil, cefuroxime axetil, cephalexin and locarbef for uncomplicated UTI. Note that some isolates may be susceptible to these agents while testing resistant to cefazolin.      -  Cefepime: S <=2      -  Piperacillin/Tazobactam: S <=8      -  Ciprofloxacin: S <=0.25      -  Imipenem: S <=1      -  Ceftriaxone: S <=1      -  Ampicillin: S <=8 These ampicillin results predict results for amoxicillin      Method Type: MATHEUS      -  Meropenem: S <=1      -  Ampicillin/Sulbactam: S <=4/2      -  Cefoxitin: S <=8      -  Cefuroxime: S 8      -  Amoxicillin/Clavulanic Acid: S <=8/4      -  Trimethoprim/Sulfamethoxazole: S <=0.5/9.5      -  Ertapenem: S <=0.5    Culture - Blood (collected 22 Oct 2022 10:30)  Source: .Blood Blood-Peripheral  Final Report:    No Growth Final    Culture - Blood (collected 22 Oct 2022 10:25)  Source: .Blood Blood-Venous  Final Report:    No Growth Final    Culture - Urine (collected 21 Oct 2022 14:37)  Source: Catheterized Catheterized  Final Report:    50,000 - 99,000 CFU/mL Klebsiella pneumoniae  Organism: Klebsiella pneumoniae  Organism: Klebsiella pneumoniae    Sensitivities:      -  Levofloxacin: S <=0.5      -  Tobramycin: S <=2      -  Nitrofurantoin: I 64 Should not be used to treat pyelonephritis      -  Aztreonam: S <=4      -  Gentamicin: S <=2      -  Cefazolin: S <=2 For uncomplicated UTI with K. pneumoniae, E. coli, or P. mirablis: MATHEUS <=16 is sensitive and MATHEUS >=32 is resistant. This also predicts results for oral agents cefaclor, cefdinir, cefpodoxime, cefprozil, cefuroxime axetil, cephalexin and locarbef for uncomplicated UTI. Note that some isolates may be susceptible to these agents while testing resistant to cefazolin.      -  Cefepime: S <=2      -  Piperacillin/Tazobactam: S <=8      -  Ciprofloxacin: S <=0.25      -  Imipenem: S <=1      -  Ceftriaxone: S <=1 Enterobacter, Klebsiella aerogenes, Citrobacter, and Serratia may develop resistance during prolonged therapy      -  Ampicillin: R >16 These ampicillin results predict results for amoxicillin      Method Type: MATHEUS      -  Meropenem: S <=1      -  Ampicillin/Sulbactam: S <=4/2 Enterobacter, Klebsiella aerogenes, Citrobacter, and Serratia may develop resistance during prolonged therapy (3-4 days)      -  Cefoxitin: S <=8      -  Amikacin: S <=16      -  Amoxicillin/Clavulanic Acid: S <=8/4      -  Trimethoprim/Sulfamethoxazole: S <=0.5/9.5      -  Ertapenem: S <=0.5      -  Tigecycline: S <=2    Culture - Urine (collected 20 Oct 2022 23:10)  Source: Catheterized Catheterized  Final Report:    <10,000 CFU/mL Normal Urogenital Nury    Culture - Blood (collected 08 Oct 2022 21:12)  Source: .Blood Blood-Peripheral  Final Report:    No Growth Final    Culture - Blood (collected 08 Oct 2022 21:07)  Source: .Blood Blood-Peripheral  Final Report:    No Growth Final      COVID-19 PCR: NotDetec (01-22-24 @ 18:53)    Lactate, Blood: 1.0 (01-22 @ 19:41)  Blood Gas Venous - Lactate: 2.5 (01-22 @ 16:44)    CSF:    RADIOLOGY:  < from: CT Abdomen and Pelvis No Cont (01.23.24 @ 20:32) >  FINDINGS:  LOWER CHEST: Small right pleural effusion not seen previously. Mild   groundglass opacities in the lungs does not appear significantly changed.    LIVER: Within normal limits.  BILE DUCTS: Normal caliber.  GALLBLADDER: Within normal limits.  SPLEEN: Within normal limits.  PANCREAS: Within normal limits.  ADRENALS: Within normal limits.  KIDNEYS/URETERS: No renal stones or hydronephrosis. Minimal perinephric   stranding adjacent to the lower pole the right kidney is nonspecific.    BLADDER: Bladder wall thickening. This may represent cystitis.  REPRODUCTIVE ORGANS: Grossly unremarkable.    BOWEL: No bowel obstruction. Appendix within normal limits.  PERITONEUM: Mild fluid in the cul-de-sac not seenpreviously is   nonspecific..  VESSELS: Within normal limits.  RETROPERITONEUM/LYMPH NODES: No lymphadenopathy.  ABDOMINAL WALL: Small umbilical hernia containing fat.  BONES: Within normal limits.    IMPRESSION:  Bladder wall thickening raising concern for cystitis. Please correlate   clinically. Limited evaluation for pyelonephritis on this noncontrast CT   scan. Minimal soft tissue stranding adjacent to the lower pole of the   right kidney was not seen previously and is nonspecific. Clinical   correlation is suggested.    Mild fluid in the cul-de-sac not seen previously is nonspecific in this   presumed articulating woman.    Minimal right pleural fluid not seen previously. Mild groundglass   opacities at the lung bases was noted on the prior exam.      < end of copied text >  < from: Xray Chest 1 View AP/PA (01.23.24 @ 14:23) >    FINDINGS:    The heart is similar in size.  Mildly increased perihilar interstitial lung markings bilaterally.  There is no pneumothorax. Trace left-sided pleural effusion.  No acute abnormalities in the visualized osseous structures.    IMPRESSION:  Mild pulmonary vascular congestion with trace left-sided pleural effusion.    < end of copied text >

## 2024-01-24 NOTE — SWALLOW BEDSIDE ASSESSMENT ADULT - DIET PRIOR TO ADMI
unknown at this time- patient profile noted difficulty swallowing foods and liquids
"soft and cut up" per mom

## 2024-01-25 LAB
-  AMOXICILLIN/CLAVULANIC ACID: SIGNIFICANT CHANGE UP
-  AMPICILLIN/SULBACTAM: SIGNIFICANT CHANGE UP
-  AMPICILLIN/SULBACTAM: SIGNIFICANT CHANGE UP
-  AMPICILLIN: SIGNIFICANT CHANGE UP
-  AMPICILLIN: SIGNIFICANT CHANGE UP
-  AZTREONAM: SIGNIFICANT CHANGE UP
-  AZTREONAM: SIGNIFICANT CHANGE UP
-  CEFAZOLIN: SIGNIFICANT CHANGE UP
-  CEFAZOLIN: SIGNIFICANT CHANGE UP
-  CEFEPIME: SIGNIFICANT CHANGE UP
-  CEFEPIME: SIGNIFICANT CHANGE UP
-  CEFOXITIN: SIGNIFICANT CHANGE UP
-  CEFOXITIN: SIGNIFICANT CHANGE UP
-  CEFTRIAXONE: SIGNIFICANT CHANGE UP
-  CEFTRIAXONE: SIGNIFICANT CHANGE UP
-  CEFUROXIME: SIGNIFICANT CHANGE UP
-  CIPROFLOXACIN: SIGNIFICANT CHANGE UP
-  CIPROFLOXACIN: SIGNIFICANT CHANGE UP
-  ERTAPENEM: SIGNIFICANT CHANGE UP
-  ERTAPENEM: SIGNIFICANT CHANGE UP
-  GENTAMICIN: SIGNIFICANT CHANGE UP
-  GENTAMICIN: SIGNIFICANT CHANGE UP
-  IMIPENEM: SIGNIFICANT CHANGE UP
-  IMIPENEM: SIGNIFICANT CHANGE UP
-  LEVOFLOXACIN: SIGNIFICANT CHANGE UP
-  LEVOFLOXACIN: SIGNIFICANT CHANGE UP
-  MEROPENEM: SIGNIFICANT CHANGE UP
-  MEROPENEM: SIGNIFICANT CHANGE UP
-  NITROFURANTOIN: SIGNIFICANT CHANGE UP
-  PIPERACILLIN/TAZOBACTAM: SIGNIFICANT CHANGE UP
-  PIPERACILLIN/TAZOBACTAM: SIGNIFICANT CHANGE UP
-  TOBRAMYCIN: SIGNIFICANT CHANGE UP
-  TOBRAMYCIN: SIGNIFICANT CHANGE UP
-  TRIMETHOPRIM/SULFAMETHOXAZOLE: SIGNIFICANT CHANGE UP
-  TRIMETHOPRIM/SULFAMETHOXAZOLE: SIGNIFICANT CHANGE UP
ANION GAP SERPL CALC-SCNC: 11 MMOL/L — SIGNIFICANT CHANGE UP (ref 5–17)
BUN SERPL-MCNC: 7 MG/DL — SIGNIFICANT CHANGE UP (ref 7–23)
CALCIUM SERPL-MCNC: 8.4 MG/DL — SIGNIFICANT CHANGE UP (ref 8.4–10.5)
CHLORIDE SERPL-SCNC: 109 MMOL/L — HIGH (ref 96–108)
CO2 SERPL-SCNC: 25 MMOL/L — SIGNIFICANT CHANGE UP (ref 22–31)
CREAT SERPL-MCNC: 0.74 MG/DL — SIGNIFICANT CHANGE UP (ref 0.5–1.3)
CULTURE RESULTS: ABNORMAL
EGFR: 103 ML/MIN/1.73M2 — SIGNIFICANT CHANGE UP
FERRITIN SERPL-MCNC: 512 NG/ML — HIGH (ref 15–150)
FOLATE SERPL-MCNC: 15.2 NG/ML — SIGNIFICANT CHANGE UP
GLUCOSE BLDC GLUCOMTR-MCNC: 112 MG/DL — HIGH (ref 70–99)
GLUCOSE BLDC GLUCOMTR-MCNC: 116 MG/DL — HIGH (ref 70–99)
GLUCOSE BLDC GLUCOMTR-MCNC: 118 MG/DL — HIGH (ref 70–99)
GLUCOSE BLDC GLUCOMTR-MCNC: 119 MG/DL — HIGH (ref 70–99)
GLUCOSE SERPL-MCNC: 116 MG/DL — HIGH (ref 70–99)
HCT VFR BLD CALC: 36.4 % — SIGNIFICANT CHANGE UP (ref 34.5–45)
HGB BLD-MCNC: 12.1 G/DL — SIGNIFICANT CHANGE UP (ref 11.5–15.5)
IRON SATN MFR SERPL: 32 % — SIGNIFICANT CHANGE UP (ref 14–50)
IRON SATN MFR SERPL: 60 UG/DL — SIGNIFICANT CHANGE UP (ref 30–160)
LEVETIRACETAM SERPL-MCNC: 57.3 UG/ML — HIGH (ref 10–40)
MAGNESIUM SERPL-MCNC: 2 MG/DL — SIGNIFICANT CHANGE UP (ref 1.6–2.6)
MCHC RBC-ENTMCNC: 33.2 GM/DL — SIGNIFICANT CHANGE UP (ref 32–36)
MCHC RBC-ENTMCNC: 34.4 PG — HIGH (ref 27–34)
MCV RBC AUTO: 103.4 FL — HIGH (ref 80–100)
METHOD TYPE: SIGNIFICANT CHANGE UP
METHOD TYPE: SIGNIFICANT CHANGE UP
NRBC # BLD: 0 /100 WBCS — SIGNIFICANT CHANGE UP (ref 0–0)
ORGANISM # SPEC MICROSCOPIC CNT: ABNORMAL
PHOSPHATE SERPL-MCNC: 3.1 MG/DL — SIGNIFICANT CHANGE UP (ref 2.5–4.5)
PLATELET # BLD AUTO: 179 K/UL — SIGNIFICANT CHANGE UP (ref 150–400)
POTASSIUM SERPL-MCNC: 3.3 MMOL/L — LOW (ref 3.5–5.3)
POTASSIUM SERPL-SCNC: 3.3 MMOL/L — LOW (ref 3.5–5.3)
RBC # BLD: 3.52 M/UL — LOW (ref 3.8–5.2)
RBC # FLD: 12.2 % — SIGNIFICANT CHANGE UP (ref 10.3–14.5)
SODIUM SERPL-SCNC: 145 MMOL/L — SIGNIFICANT CHANGE UP (ref 135–145)
SPECIMEN SOURCE: SIGNIFICANT CHANGE UP
TIBC SERPL-MCNC: 188 UG/DL — LOW (ref 220–430)
TSH SERPL-MCNC: 5.99 UIU/ML — HIGH (ref 0.27–4.2)
UIBC SERPL-MCNC: 129 UG/DL — SIGNIFICANT CHANGE UP (ref 110–370)
VIT B12 SERPL-MCNC: 644 PG/ML — SIGNIFICANT CHANGE UP (ref 232–1245)
WBC # BLD: 5.23 K/UL — SIGNIFICANT CHANGE UP (ref 3.8–10.5)
WBC # FLD AUTO: 5.23 K/UL — SIGNIFICANT CHANGE UP (ref 3.8–10.5)

## 2024-01-25 PROCEDURE — 99232 SBSQ HOSP IP/OBS MODERATE 35: CPT

## 2024-01-25 PROCEDURE — 95720 EEG PHY/QHP EA INCR W/VEEG: CPT

## 2024-01-25 RX ORDER — CEFTRIAXONE 500 MG/1
INJECTION, POWDER, FOR SOLUTION INTRAMUSCULAR; INTRAVENOUS
Refills: 0 | Status: DISCONTINUED | OUTPATIENT
Start: 2024-01-25 | End: 2024-01-31

## 2024-01-25 RX ORDER — CEFTRIAXONE 500 MG/1
1000 INJECTION, POWDER, FOR SOLUTION INTRAMUSCULAR; INTRAVENOUS ONCE
Refills: 0 | Status: COMPLETED | OUTPATIENT
Start: 2024-01-25 | End: 2024-01-25

## 2024-01-25 RX ORDER — LACOSAMIDE 50 MG/1
100 TABLET ORAL
Refills: 0 | Status: DISCONTINUED | OUTPATIENT
Start: 2024-01-25 | End: 2024-01-26

## 2024-01-25 RX ORDER — SODIUM CHLORIDE 9 MG/ML
500 INJECTION INTRAMUSCULAR; INTRAVENOUS; SUBCUTANEOUS ONCE
Refills: 0 | Status: COMPLETED | OUTPATIENT
Start: 2024-01-25 | End: 2024-01-25

## 2024-01-25 RX ORDER — INSULIN LISPRO 100/ML
VIAL (ML) SUBCUTANEOUS
Refills: 0 | Status: DISCONTINUED | OUTPATIENT
Start: 2024-01-25 | End: 2024-01-27

## 2024-01-25 RX ORDER — POTASSIUM CHLORIDE 20 MEQ
40 PACKET (EA) ORAL EVERY 4 HOURS
Refills: 0 | Status: COMPLETED | OUTPATIENT
Start: 2024-01-25 | End: 2024-01-25

## 2024-01-25 RX ORDER — INSULIN LISPRO 100/ML
VIAL (ML) SUBCUTANEOUS AT BEDTIME
Refills: 0 | Status: DISCONTINUED | OUTPATIENT
Start: 2024-01-25 | End: 2024-01-27

## 2024-01-25 RX ORDER — CEFTRIAXONE 500 MG/1
1000 INJECTION, POWDER, FOR SOLUTION INTRAMUSCULAR; INTRAVENOUS EVERY 24 HOURS
Refills: 0 | Status: DISCONTINUED | OUTPATIENT
Start: 2024-01-26 | End: 2024-01-31

## 2024-01-25 RX ORDER — ACETAMINOPHEN 500 MG
650 TABLET ORAL ONCE
Refills: 0 | Status: COMPLETED | OUTPATIENT
Start: 2024-01-25 | End: 2024-01-25

## 2024-01-25 RX ADMIN — Medication 100 MILLIGRAM(S): at 21:53

## 2024-01-25 RX ADMIN — SODIUM CHLORIDE 100 MILLILITER(S): 9 INJECTION INTRAMUSCULAR; INTRAVENOUS; SUBCUTANEOUS at 09:00

## 2024-01-25 RX ADMIN — LACOSAMIDE 120 MILLIGRAM(S): 50 TABLET ORAL at 05:29

## 2024-01-25 RX ADMIN — CEFTRIAXONE 100 MILLIGRAM(S): 500 INJECTION, POWDER, FOR SOLUTION INTRAMUSCULAR; INTRAVENOUS at 16:36

## 2024-01-25 RX ADMIN — Medication 650 MILLIGRAM(S): at 21:30

## 2024-01-25 RX ADMIN — Medication 650 MILLIGRAM(S): at 20:48

## 2024-01-25 RX ADMIN — SENNA PLUS 2 TABLET(S): 8.6 TABLET ORAL at 21:53

## 2024-01-25 RX ADMIN — LACOSAMIDE 100 MILLIGRAM(S): 50 TABLET ORAL at 17:07

## 2024-01-25 RX ADMIN — Medication 40 MILLIEQUIVALENT(S): at 15:36

## 2024-01-25 RX ADMIN — CEFTRIAXONE 100 MILLIGRAM(S): 500 INJECTION, POWDER, FOR SOLUTION INTRAMUSCULAR; INTRAVENOUS at 07:51

## 2024-01-25 RX ADMIN — Medication 40 MILLIEQUIVALENT(S): at 10:48

## 2024-01-25 RX ADMIN — ENOXAPARIN SODIUM 40 MILLIGRAM(S): 100 INJECTION SUBCUTANEOUS at 05:29

## 2024-01-25 NOTE — PROGRESS NOTE ADULT - ASSESSMENT
43-year-old female with a past medical history significant for Down syndrome, dementia, nonverbal at baseline, epilepsy who is admitted to the hospital for an elective EMU admission.     Patient with history of seizures, first generalized tonic-clonic seizure noted in October 2022, was admitted to [facility] at the time.  Patient then followed up after discharge with neurology regularly.     Patient's family reports that seizure frequency has been increasing however background is unclear therefore patient was recommended for an EMU admission for further characterization and management.     Patient with recent admission to [facility] in December 2023 under similar circumstances.  During this hospitalization, was treated for multifocal pneumonia with ceftriaxone and azithromycin, urine culture had grown Pseudomonas.     Upon admission, patient suffered seizure resulting in a CODE BLUE and patient given Ativan, Keppra and Versed.  Seizures abated as a result.  Since admission, patient with a Tmax 102.1 on 1/22/2024, fever curve trending better, Tmax 100.6 Fahrenheit past 24 hours.   Latest labs with no leukocytosis, BMP with renal function within normal limits, hepatic function within normal limits.  Urinalysis with evidence for pyuria.  Urine culture with E. coli.  CT abdomen/pelvis shows bladder wall thickening, some soft tissue stranding in the lower pole of the right kidney noted.  Blood cultures obtained on admission with E. coli, negative for resistance gene testing.  Patient currently on ceftriaxone.     #E. coli bacteremia, likely urinary source   #E. coli UTI   #Abnormal abdominal imaging   #Seizure disorder     Recommendations   Continue ceftriaxone 1 g every 24 hours  Obtain repeat blood cultures  Will plan for 10 day course with PO  transition provided clinical improving and negative repeat blood cultures  Follow fever curve and WBC count    Heath Bradley MD  Division of Infectious Diseases

## 2024-01-25 NOTE — PROGRESS NOTE ADULT - ASSESSMENT
44 y/o female with PMH of Down syndrome, dementia, nonverbal, decreased mobility, epilepsy who presents for elective EMU admission.    Epilepsy  - no acute seizures per neurology  - cont vimpat    fever metabolic encephalopathy  - UTI  -  sepsis  - Ecoli Bacteremia and cystitis  - c/w ceftriaxone  - follow up cultures  - ID is following    dysphagia  - swallow eval  - NPO for now  -  NG tube abd start feeds    constipation  - senna and miralax    anemia  - iron studies  - c/w iron    dvt px    d/w mother

## 2024-01-25 NOTE — PROVIDER CONTACT NOTE (CHANGE IN STATUS NOTIFICATION) - SITUATION
Tele tech notifying RN of pt HR sustaining 44 while asleep; lowest 38 while asleep;  BP 80s/50s, Tele tech notifying RN of pt HR sustaining 44 while asleep; lowest 38 while asleep;  BP 82/58

## 2024-01-25 NOTE — PROGRESS NOTE ADULT - SUBJECTIVE AND OBJECTIVE BOX
Follow Up:  bacteremia    Interval History/ROS:  Overnight: No acute events.  Patient remains afebrile.  Otherwise hemodynamically stable on room air.  Latest labs show no leukocytosis, BMP with renal function within normal limits.    Patient seen examined at bedside.  Unable to obtain reliable ROS.  Mother at bedside, states daughter appears comfortable.    Allergies  No Known Allergies    ANTIMICROBIALS:  cefTRIAXone   IVPB        OTHER MEDS:  MEDICATIONS  (STANDING):  dextrose 50% Injectable 25 once  dextrose 50% Injectable 25 once  dextrose 50% Injectable 12.5 once  dextrose Oral Gel 15 once PRN  enoxaparin Injectable 40 every 24 hours  glucagon  Injectable 1 once  influenza   Vaccine 0.5 once  insulin lispro (ADMELOG) corrective regimen sliding scale  three times a day before meals  insulin lispro (ADMELOG) corrective regimen sliding scale  at bedtime  lacosamide 100 two times a day  polyethylene glycol 3350 17 daily  senna 2 at bedtime  traZODone 100 at bedtime      Vital Signs Last 24 Hrs  T(C): 36.7 (25 Jan 2024 20:40), Max: 36.8 (25 Jan 2024 00:36)  T(F): 98 (25 Jan 2024 20:40), Max: 98.2 (25 Jan 2024 00:36)  HR: 71 (25 Jan 2024 20:40) (50 - 86)  BP: 119/66 (25 Jan 2024 20:40) (82/58 - 119/66)  BP(mean): --  RR: 18 (25 Jan 2024 20:40) (15 - 18)  SpO2: 94% (25 Jan 2024 20:40) (93% - 100%)    Parameters below as of 25 Jan 2024 20:40  Patient On (Oxygen Delivery Method): room air        PHYSICAL EXAM:  GA: NAD, AOx3  HEENT: oral cavity no lesion  CV: nl S1/S2, no RMG  Lungs: CTAB, No distress  Abd: BS+, soft, nontender, no rebounding pain  Ext: no edema  Neuro: No focal deficits  Skin: Intact  IV: no phlebitis                              12.1   5.23  )-----------( 179      ( 25 Jan 2024 05:08 )             36.4       01-25    145  |  109<H>  |  7   ----------------------------<  116<H>  3.3<L>   |  25  |  0.74    Ca    8.4      25 Jan 2024 05:08  Phos  3.1     01-25  Mg     2.0     01-25    TPro  6.2  /  Alb  3.2<L>  /  TBili  0.5  /  DBili  x   /  AST  21  /  ALT  15  /  AlkPhos  71  01-24      Urinalysis Basic - ( 25 Jan 2024 05:08 )    Color: x / Appearance: x / SG: x / pH: x  Gluc: 116 mg/dL / Ketone: x  / Bili: x / Urobili: x   Blood: x / Protein: x / Nitrite: x   Leuk Esterase: x / RBC: x / WBC x   Sq Epi: x / Non Sq Epi: x / Bacteria: x        MICROBIOLOGY:  v    Culture - Blood (collected 23 Jan 2024 10:40)  Source: .Blood Blood-Venous  Gram Stain (24 Jan 2024 14:26):    Growth in aerobic bottle: Gram Negative Rods    Growth in anaerobic bottle: Gram Negative Rods  Final Report (25 Jan 2024 18:29):    Growth in aerobic and anaerobic bottles: Escherichia coli    See previous culture [id number]    Culture - Blood (collected 23 Jan 2024 10:30)  Source: .Blood Blood  Gram Stain (24 Jan 2024 03:15):    Growth in aerobic bottle: Gram Negative Rods  Final Report (25 Jan 2024 16:35):    Growth in aerobic bottle: Escherichia coli    Direct identification is available within approximately 3-5    hours either by Blood Panel Multiplexed PCR or Direct    MALDI-TOF. Details: [email or web address]  Organism: Blood Culture PCR  Escherichia coli (25 Jan 2024 16:35)  Organism: Escherichia coli (25 Jan 2024 16:35)      Method Type: MATHEUS      -  Ampicillin: S <=8 These ampicillin results predict results for amoxicillin      -  Ampicillin/Sulbactam: S <=4/2      -  Aztreonam: S <=4      -  Cefazolin: S <=2      -  Cefepime: S <=2      -  Cefoxitin: S <=8      -  Ceftriaxone: S <=1      -  Ciprofloxacin: S <=0.25      -  Ertapenem: S <=0.5      -  Gentamicin: S <=2      -  Imipenem: S <=1      -  Levofloxacin: S <=0.5      -  Meropenem: S <=1      -  Piperacillin/Tazobactam: S <=8      -  Tobramycin: S <=2      -  Trimethoprim/Sulfamethoxazole: S <=0.5/9.5  Organism: Blood Culture PCR (25 Jan 2024 16:35)      Method Type: PCR      -  Escherichia coli: Detec    Culture - Urine (collected 23 Jan 2024 10:07)  Source: Catheterized Catheterized  Final Report (25 Jan 2024 19:51):    >100,000 CFU/ml Escherichia coli  Organism: Escherichia coli (25 Jan 2024 19:51)  Organism: Escherichia coli (25 Jan 2024 19:51)      Method Type: MATHEUS      -  Amoxicillin/Clavulanic Acid: S <=8/4      -  Ampicillin: S <=8 These ampicillin results predict results for amoxicillin      -  Ampicillin/Sulbactam: S <=4/2      -  Aztreonam: S <=4      -  Cefazolin: S <=2 For uncomplicated UTI with K. pneumoniae, E. coli, or P. mirablis: MATHEUS <=16 is sensitive and MATHEUS >=32 is resistant. This also predicts results for oral agents cefaclor, cefdinir, cefpodoxime, cefprozil, cefuroxime axetil, cephalexin and locarbef for uncomplicated UTI. Note that some isolates may be susceptible to these agents while testing resistant to cefazolin.      -  Cefepime: S <=2      -  Cefoxitin: S <=8      -  Ceftriaxone: S <=1      -  Cefuroxime: S <=4      -  Ciprofloxacin: S <=0.25      -  Ertapenem: S <=0.5      -  Gentamicin: S <=2      -  Imipenem: S <=1      -  Levofloxacin: S <=0.5      -  Meropenem: S <=1      -  Nitrofurantoin: S <=32 Should not be used to treat pyelonephritis      -  Piperacillin/Tazobactam: S <=8      -  Tobramycin: S <=2      -  Trimethoprim/Sulfamethoxazole: S <=0.5/9.5                    RADIOLOGY:  Imaging reviewed

## 2024-01-25 NOTE — EEG REPORT - NS EEG TEXT BOX
DAY 3 	START: 1/24/2024  08:00 AM     	END: 1/25/2024  08:00 AM   	DURATION: 24 HR      DAILY EEG VISUAL ANALYSIS    The background was continuous, symmetric, spontaneously variable and reactive. During wakefulness, the posterior dominant rhythm was absent.    BACKGROUND SLOWING:  Diffuse polymorphic delta and theta activity was present.    FOCAL SLOWING:   None was present.    SLEEP BACKGROUND:  Drowsiness was characterized by fragmentation, attenuation, and slowing of the background activity.    No stage 2 sleep architecture was seen.    OTHER NON-EPILEPTIFORM FINDINGS:  None were present.    ACTIVATION PROCEDURES:   Hyperventilation was not performed.    Photic stimulation was not performed.    INTERICTAL EPILEPTIFORM ACTIVITY:   Frequent bifrontal sharp waves, max F3/F4, often asymmetrical favoring left > right, at times in couple secs periodic bursts up to 1.5 hz.    EVENTS:  None reported.    ARTIFACTS:  Intermittent myogenic and movement artifacts were noted.    ECG:  The heart rate on single channel ECG was predominantly between 70-80 BPM.    ASMs:   Discontinued Keppra  Continue Vimpat    -------------------------------------------------------------------------------------------------------------------------------------------------------  EEG SUMMARY:  Abnormal EEG in the awake, drowsy and asleep states.  •	Frequent bifrontal sharp waves, max F3/F4, often asymmetrical favoring left > right, at times in couple secs periodic bursts up to 1.5 hz.  •	Moderate diffuse background slowing.    -------------------------------------------------------------------------------------------------------------------------------------------------------  IMPRESSION/CLINICAL CORRELATE:  This is an abnormal EEG record.   •	Increased risk for seizures, left frontal predominance of bifrontal discharges suggest likely left frontal focus with rapid synchrony.  •	Moderate diffuse cerebral dysfunction, nonspecific.  -------------------------------------------------------------------------------------------------------------------------------------------------------  Claudia Reed MD  Fellow, [facility]   DAY 3 	START: 1/24/2024  08:00 AM     	END: 1/25/2024  08:00 AM   	DURATION: 24 HR      DAILY EEG VISUAL ANALYSIS    The background was continuous, symmetric, spontaneously variable and reactive. During wakefulness, the posterior dominant rhythm was absent.    BACKGROUND SLOWING:  Diffuse polymorphic delta and theta activity was present.    FOCAL SLOWING:   None was present.    SLEEP BACKGROUND:  Drowsiness was characterized by fragmentation, attenuation, and slowing of the background activity.    No stage 2 sleep architecture was seen.    OTHER NON-EPILEPTIFORM FINDINGS:  None were present.    ACTIVATION PROCEDURES:   Hyperventilation was not performed.    Photic stimulation was not performed.    INTERICTAL EPILEPTIFORM ACTIVITY:   Frequent bifrontal sharp waves, max F3/F4, often asymmetrical favoring left > right, at times in couple secs periodic bursts up to 1.5 hz.    EVENTS:  None reported.    ARTIFACTS:  Intermittent myogenic and movement artifacts were noted.    ECG:  The heart rate on single channel ECG was predominantly between 70-80 BPM.    ASMs:   Discontinued Keppra  Continue Vimpat    -------------------------------------------------------------------------------------------------------------------------------------------------------  EEG SUMMARY:  Abnormal EEG in the awake, drowsy and asleep states.  •	Frequent bifrontal sharp waves, max F3/F4, often asymmetrical favoring left > right, at times in couple secs periodic bursts up to 1.5 hz.  •	Moderate diffuse background slowing.    -------------------------------------------------------------------------------------------------------------------------------------------------------  IMPRESSION/CLINICAL CORRELATE:  This is an abnormal EEG record.   •	Increased risk for seizures, left frontal predominance of bifrontal discharges suggest likely left frontal focus with rapid synchrony.  •	Moderate diffuse cerebral dysfunction, nonspecific.  -------------------------------------------------------------------------------------------------------------------------------------------------------  Claudia Reed MD  Fellow, [facility]    Robert Cote MD PhD  Director, Epilepsy Division, [facility]

## 2024-01-25 NOTE — CHART NOTE - NSCHARTNOTEFT_GEN_A_CORE
44 y/o female with PMH of Down syndrome, dementia, nonverbal, decreased mobility, epilepsy who presents for elective EMU admission.   Per neuro-> Myoclonic jerks with O2 desaturation with concern for seizures. EEG negative thus far for seizures, but patient at high risk for seizures.   ID following-> found to have UTI and ecoli bacteremia. on abx.   EEG negative for seizure activity thus far.     Bedside swallow evaluation completed 1/24/24-> suspect behavioral feeding component superimposed upon dysphagia, patient holding all food/liquid in oral cavity, significantly delayed pharyngeal swallows noted. NPO, with non-oral nutrition/hydration/medications recommended.     Pt seen today for reassessment of swallow.             Impressions: Exam limited due to reduced acceptance of po feeding trials, however, improved participation/orientation to feeding on today's re-evaluation. Pt presents with evidence of an oropharyngeal dysphagia characterized by delayed oral transit time and delayed pharyngeal swallows. Changes in vocal quality noted post swallow on thin liquid indicative of laryngeal penetration/aspiration.     Recommendations: puree and moderately thick liquids via teaspoon only. 100% supervision with all meals, meds crushed in applesauce, small bites/small sips, feed slowly. Suggest dietary consult -> ? need for supplements/calorie counts, unclear if patient will meet nutritional needs. This service will continue to follow for diet tolerance and candidacy for diet upgrade. Education provided to mom regarding dysphagia diet and safe swallow guidelines.  Monitor for s/s aspiration/laryngeal penetration. If noted:  D/C p.o. intake, provide non-oral nutrition/hydration/meds, and contact this service @ [phone]     D/W AMBAR Colon, mom and NP Macey via teams   [employer] [employer] Teams/ext [phone]   Speech pathology

## 2024-01-25 NOTE — PROGRESS NOTE ADULT - SUBJECTIVE AND OBJECTIVE BOX
DATE OF SERVICE: 01-25-24 @ 12:12    Patient is a 43y old  Female who presents with a chief complaint of EMU admission (24 Jan 2024 15:33)      SUBJECTIVE / OVERNIGHT EVENTS:  nonverbal , does not follow commands    MEDICATIONS  (STANDING):  dextrose 5% + sodium chloride 0.45%. 1000 milliLiter(s) (60 mL/Hr) IV Continuous <Continuous>  dextrose 5%. 1000 milliLiter(s) (100 mL/Hr) IV Continuous <Continuous>  dextrose 5%. 1000 milliLiter(s) (50 mL/Hr) IV Continuous <Continuous>  dextrose 50% Injectable 25 Gram(s) IV Push once  dextrose 50% Injectable 12.5 Gram(s) IV Push once  dextrose 50% Injectable 25 Gram(s) IV Push once  enoxaparin Injectable 40 milliGRAM(s) SubCutaneous every 24 hours  ferrous    sulfate 325 milliGRAM(s) Oral daily  glucagon  Injectable 1 milliGRAM(s) IntraMuscular once  influenza   Vaccine 0.5 milliLiter(s) IntraMuscular once  insulin lispro (ADMELOG) corrective regimen sliding scale   SubCutaneous every 6 hours  lacosamide IVPB 100 milliGRAM(s) IV Intermittent every 12 hours  polyethylene glycol 3350 17 Gram(s) Oral daily  potassium chloride   Powder 40 milliEquivalent(s) Oral every 4 hours  senna 2 Tablet(s) Oral at bedtime  sodium chloride 0.9% Bolus 500 milliLiter(s) IV Bolus once  traZODone 100 milliGRAM(s) Oral at bedtime    MEDICATIONS  (PRN):  dextrose Oral Gel 15 Gram(s) Oral once PRN Blood Glucose LESS THAN 70 milliGRAM(s)/deciliter      Vital Signs Last 24 Hrs  T(C): 36.4 (25 Jan 2024 05:00), Max: 36.9 (24 Jan 2024 20:53)  T(F): 97.5 (25 Jan 2024 05:00), Max: 98.5 (24 Jan 2024 20:53)  HR: 50 (25 Jan 2024 08:48) (50 - 86)  BP: 82/58 (25 Jan 2024 08:48) (82/58 - 108/73)  BP(mean): --  RR: 15 (25 Jan 2024 08:48) (15 - 16)  SpO2: 100% (25 Jan 2024 08:48) (97% - 100%)    Parameters below as of 25 Jan 2024 08:48  Patient On (Oxygen Delivery Method): room air      CAPILLARY BLOOD GLUCOSE      POCT Blood Glucose.: 119 mg/dL (25 Jan 2024 11:26)  POCT Blood Glucose.: 112 mg/dL (25 Jan 2024 04:44)  POCT Blood Glucose.: 121 mg/dL (24 Jan 2024 23:33)  POCT Blood Glucose.: 136 mg/dL (24 Jan 2024 17:31)    I&O's Summary    24 Jan 2024 07:01  -  25 Jan 2024 07:00  --------------------------------------------------------  IN: 0 mL / OUT: 1000 mL / NET: -1000 mL        PHYSICAL EXAM:  GENERAL: NAD, well-developed  HEAD:  Atraumatic, Normocephalic  EYES: EOMI, PERRLA, conjunctiva and sclera clear  NECK: Supple, No JVD  CHEST/LUNG: Clear to auscultation bilaterally; No wheeze  HEART: Regular rate and rhythm; No murmurs, rubs, or gallops  ABDOMEN: Soft, Nontender, Nondistended; Bowel sounds present  EXTREMITIES:  2+ Peripheral Pulses, No clubbing, cyanosis, or edema  PSYCH: AAOx3  NEUROLOGY: non-focal  SKIN: No rashes or lesions    LABS:                        12.1   5.23  )-----------( 179      ( 25 Jan 2024 05:08 )             36.4     01-25    145  |  109<H>  |  7   ----------------------------<  116<H>  3.3<L>   |  25  |  0.74    Ca    8.4      25 Jan 2024 05:08  Phos  3.1     01-25  Mg     2.0     01-25    TPro  6.2  /  Alb  3.2<L>  /  TBili  0.5  /  DBili  x   /  AST  21  /  ALT  15  /  AlkPhos  71  01-24          Urinalysis Basic - ( 25 Jan 2024 05:08 )    Color: x / Appearance: x / SG: x / pH: x  Gluc: 116 mg/dL / Ketone: x  / Bili: x / Urobili: x   Blood: x / Protein: x / Nitrite: x   Leuk Esterase: x / RBC: x / WBC x   Sq Epi: x / Non Sq Epi: x / Bacteria: x        RADIOLOGY & ADDITIONAL TESTS:    Imaging Personally Reviewed:    Consultant(s) Notes Reviewed:      Care Discussed with Consultants/Other Providers:

## 2024-01-26 LAB
ANION GAP SERPL CALC-SCNC: 9 MMOL/L — SIGNIFICANT CHANGE UP (ref 5–17)
BUN SERPL-MCNC: 6 MG/DL — LOW (ref 7–23)
CALCIUM SERPL-MCNC: 8.3 MG/DL — LOW (ref 8.4–10.5)
CHLORIDE SERPL-SCNC: 108 MMOL/L — SIGNIFICANT CHANGE UP (ref 96–108)
CO2 SERPL-SCNC: 26 MMOL/L — SIGNIFICANT CHANGE UP (ref 22–31)
CREAT SERPL-MCNC: 0.71 MG/DL — SIGNIFICANT CHANGE UP (ref 0.5–1.3)
EGFR: 108 ML/MIN/1.73M2 — SIGNIFICANT CHANGE UP
GLUCOSE BLDC GLUCOMTR-MCNC: 112 MG/DL — HIGH (ref 70–99)
GLUCOSE BLDC GLUCOMTR-MCNC: 133 MG/DL — HIGH (ref 70–99)
GLUCOSE BLDC GLUCOMTR-MCNC: 136 MG/DL — HIGH (ref 70–99)
GLUCOSE BLDC GLUCOMTR-MCNC: 144 MG/DL — HIGH (ref 70–99)
GLUCOSE SERPL-MCNC: 123 MG/DL — HIGH (ref 70–99)
POTASSIUM SERPL-MCNC: 3.7 MMOL/L — SIGNIFICANT CHANGE UP (ref 3.5–5.3)
POTASSIUM SERPL-SCNC: 3.7 MMOL/L — SIGNIFICANT CHANGE UP (ref 3.5–5.3)
SODIUM SERPL-SCNC: 143 MMOL/L — SIGNIFICANT CHANGE UP (ref 135–145)

## 2024-01-26 PROCEDURE — 99231 SBSQ HOSP IP/OBS SF/LOW 25: CPT

## 2024-01-26 PROCEDURE — 99232 SBSQ HOSP IP/OBS MODERATE 35: CPT

## 2024-01-26 RX ORDER — ACETAMINOPHEN 500 MG
650 TABLET ORAL ONCE
Refills: 0 | Status: COMPLETED | OUTPATIENT
Start: 2024-01-26 | End: 2024-01-26

## 2024-01-26 RX ORDER — LACTULOSE 10 G/15ML
20 SOLUTION ORAL ONCE
Refills: 0 | Status: DISCONTINUED | OUTPATIENT
Start: 2024-01-26 | End: 2024-01-26

## 2024-01-26 RX ORDER — POLYETHYLENE GLYCOL 3350 17 G/17G
17 POWDER, FOR SOLUTION ORAL
Refills: 0 | Status: DISCONTINUED | OUTPATIENT
Start: 2024-01-26 | End: 2024-01-31

## 2024-01-26 RX ORDER — LACTULOSE 10 G/15ML
20 SOLUTION ORAL ONCE
Refills: 0 | Status: COMPLETED | OUTPATIENT
Start: 2024-01-26 | End: 2024-01-26

## 2024-01-26 RX ORDER — LACOSAMIDE 50 MG/1
100 TABLET ORAL
Refills: 0 | Status: DISCONTINUED | OUTPATIENT
Start: 2024-01-26 | End: 2024-01-31

## 2024-01-26 RX ADMIN — LACOSAMIDE 100 MILLIGRAM(S): 50 TABLET ORAL at 06:16

## 2024-01-26 RX ADMIN — Medication 325 MILLIGRAM(S): at 11:31

## 2024-01-26 RX ADMIN — CEFTRIAXONE 100 MILLIGRAM(S): 500 INJECTION, POWDER, FOR SOLUTION INTRAMUSCULAR; INTRAVENOUS at 15:04

## 2024-01-26 RX ADMIN — Medication 650 MILLIGRAM(S): at 14:28

## 2024-01-26 RX ADMIN — POLYETHYLENE GLYCOL 3350 17 GRAM(S): 17 POWDER, FOR SOLUTION ORAL at 11:31

## 2024-01-26 RX ADMIN — Medication 100 MILLIGRAM(S): at 21:51

## 2024-01-26 RX ADMIN — Medication 650 MILLIGRAM(S): at 11:31

## 2024-01-26 RX ADMIN — ENOXAPARIN SODIUM 40 MILLIGRAM(S): 100 INJECTION SUBCUTANEOUS at 06:16

## 2024-01-26 RX ADMIN — Medication 650 MILLIGRAM(S): at 22:11

## 2024-01-26 RX ADMIN — LACOSAMIDE 100 MILLIGRAM(S): 50 TABLET ORAL at 17:34

## 2024-01-26 RX ADMIN — Medication 650 MILLIGRAM(S): at 23:00

## 2024-01-26 RX ADMIN — SENNA PLUS 2 TABLET(S): 8.6 TABLET ORAL at 21:51

## 2024-01-26 RX ADMIN — LACTULOSE 20 GRAM(S): 10 SOLUTION ORAL at 17:34

## 2024-01-26 RX ADMIN — POLYETHYLENE GLYCOL 3350 17 GRAM(S): 17 POWDER, FOR SOLUTION ORAL at 17:34

## 2024-01-26 NOTE — PROGRESS NOTE ADULT - ASSESSMENT
43-year-old female with a past medical history significant for Down syndrome, dementia, nonverbal at baseline, epilepsy who is admitted to the hospital for an elective EMU admission.     Patient with history of seizures, first generalized tonic-clonic seizure noted in October 2022, was admitted to [facility] at the time.  Patient then followed up after discharge with neurology regularly.     Patient's family reports that seizure frequency has been increasing however background is unclear therefore patient was recommended for an EMU admission for further characterization and management.     Patient with recent admission to [facility] in December 2023 under similar circumstances.  During this hospitalization, was treated for multifocal pneumonia with ceftriaxone and azithromycin, urine culture had grown Pseudomonas.     Upon admission, patient suffered seizure resulting in a CODE BLUE and patient given Ativan, Keppra and Versed.  Seizures abated as a result.  Since admission, patient with a Tmax 102.1 on 1/22/2024, fever curve trending better, Tmax 100.6 Fahrenheit past 24 hours.   Latest labs with no leukocytosis, BMP with renal function within normal limits, hepatic function within normal limits.  Urinalysis with evidence for pyuria.  Urine culture with E. coli.  CT abdomen/pelvis shows bladder wall thickening, some soft tissue stranding in the lower pole of the right kidney noted.  Blood cultures obtained on admission with E. coli, negative for resistance gene testing.  Patient currently on ceftriaxone.     #E. coli bacteremia, likely urinary source   #E. coli UTI   #Abnormal abdominal imaging   #Seizure disorder   Bcx on admission E.coli, sensitivities reviewed - pansensitive  Clinically improving    Recommendations   Continue ceftriaxone 1 g every 24 hours  Obtain repeat blood cultures  Will plan to treat for 10 days with transition to PO amoxicillin/clavulanate 875 BID at discharge, end date: 2/2/24  Follow fever curve and WBC count    ID to sign off. Please contact as issues arise.    Heath Bradley MD  Division of Infectious Diseases

## 2024-01-26 NOTE — CHART NOTE - NSCHARTNOTEFT_GEN_A_CORE
Nutrition Follow Up Note  Patient seen for: nutrition consult. Chart reviewed, events noted.    Source: [] Patient       [x] EMR        [x] RN        [x] Family at bedside       [] Other:    -If unable to interview patient: [] Trach/Vent/BiPAP  [x] Disoriented/confused/inappropriate to interview    Diet Order:   Diet, Pureed:   Moderately Thick Liquids (MODTHICKLIQS) (01-25-24)    - Is current order appropriate/adequate? See recommendations below.    - PO intake :   [] >75%  Adequate    [] 50-75%  Fair       [] <50%  Poor    - Nutrition-related concerns:   - Pt NPO from 1/22-1/25. SLP evaluation 1/25 noted, pt recommended for puree w/ moderately thickened liquids.    - Family at bedside assisting with feeding    - BG management with sliding scale insulin     GI:  Last BM 1/24.   Bowel Regimen? [x] Yes   [] No    Weights:   no updated weights available at this time, will continue to monitor weights for changes if any      MEDICATIONS  (STANDING):  acetaminophen     Tablet .. 650 milliGRAM(s) Oral once  cefTRIAXone   IVPB      cefTRIAXone   IVPB 1000 milliGRAM(s) IV Intermittent every 24 hours  dextrose 5% + sodium chloride 0.45%. 1000 milliLiter(s) (60 mL/Hr) IV Continuous <Continuous>  dextrose 5%. 1000 milliLiter(s) (100 mL/Hr) IV Continuous <Continuous>  dextrose 5%. 1000 milliLiter(s) (50 mL/Hr) IV Continuous <Continuous>  dextrose 50% Injectable 25 Gram(s) IV Push once  dextrose 50% Injectable 25 Gram(s) IV Push once  dextrose 50% Injectable 12.5 Gram(s) IV Push once  enoxaparin Injectable 40 milliGRAM(s) SubCutaneous every 24 hours  ferrous    sulfate 325 milliGRAM(s) Oral daily  glucagon  Injectable 1 milliGRAM(s) IntraMuscular once  influenza   Vaccine 0.5 milliLiter(s) IntraMuscular once  insulin lispro (ADMELOG) corrective regimen sliding scale   SubCutaneous three times a day before meals  insulin lispro (ADMELOG) corrective regimen sliding scale   SubCutaneous at bedtime  lacosamide 100 milliGRAM(s) Oral two times a day  polyethylene glycol 3350 17 Gram(s) Oral daily  senna 2 Tablet(s) Oral at bedtime  traZODone 100 milliGRAM(s) Oral at bedtime    MEDICATIONS  (PRN):  dextrose Oral Gel 15 Gram(s) Oral once PRN Blood Glucose LESS THAN 70 milliGRAM(s)/deciliter      Pertinent Labs: 01-26 @ 05:59: Na 143, BUN 6<L>, Cr 0.71, <H>, K+ 3.7, Phos --, Mg --, Alk Phos --, ALT/SGPT --, AST/SGOT --, HbA1c --    A1C with Estimated Average Glucose Result: 4.9 % (01-24-24 @ 06:11)    Finger Sticks:  POCT Blood Glucose.: 133 mg/dL (01-26 @ 07:55)  POCT Blood Glucose.: 116 mg/dL (01-25 @ 20:39)  POCT Blood Glucose.: 118 mg/dL (01-25 @ 16:15)  POCT Blood Glucose.: 119 mg/dL (01-25 @ 11:26)    Skin per nursing documentation: No noted pressure injuries as per documentation.   Edema: No noted edema as per flow sheets.     Estimated Needs:   [x] no change since previous assessment  [] recalculated:     Previous Nutrition Diagnosis: Inadequate Oral Intake   Nutrition Diagnosis is: [x] ongoing  [] resolved [] not applicable     New Nutrition Diagnosis: [] Not applicable    Nutrition Care Plan:  [x] In Progress  [] Achieved  [] Not applicable    Nutrition Interventions:     Education Provided:       [] Yes:  [] No:     Recommendations:      1) Continue diet free of therapeutic restrictions; defer diet textures/consistencies to team/SLP.  2) Ensure Enlive 2x daily (700 sangeetha and 40 gm protein)   3) Multivitamin if not otherwise contraindicated.   4) Provide encouragement with PO intake, menu selections, and assistance with meals as needed.   5) Monitor tolerance to PO intake; if pt unable to take adequate PO (consistently <50% of meals) consider initiation of supplemental EN if within pt's GOC. Calorie count only indicated if enteral nutrition within medical GOC.     Monitoring and Evaluation:   Continue to monitor nutritional intake, tolerance to diet prescription, weights, labs, skin integrity    RD remains available upon request and will follow up per protocol    Charis Hastings MS, RD, CDN, CNSC; available via [place] Teams Nutrition Follow Up Note  Patient seen for: nutrition consult. Chart reviewed, events noted.    Source: [] Patient       [x] EMR        [x] RN        [x] Family at bedside       [] Other:    -If unable to interview patient: [] Trach/Vent/BiPAP  [x] Disoriented/confused/inappropriate to interview    Pt/family Japanese speaking,  services utilized ID# [id number] (Joshi)     Diet Order:   Diet, Pureed:   Moderately Thick Liquids (MODTHICKLIQS) (01-25-24)    - Is current order appropriate/adequate? See recommendations below.    - PO intake :   [] >75%  Adequate    [] 50-75%  Fair       [x] <50%  Poor    - Nutrition-related concerns:   - Pt NPO from 1/22-1/25. SLP evaluation 1/25 noted, pt recommended for puree w/ moderately thickened liquids.    - Pt's mother at bedside assisting with feeding; per mother pt did not eat much of breakfast, taking spoonfuls of purees at lunch but not significant amounts of meal tray. Amenable to oral nutrition supplement to promote PO intake.   - BG management with sliding scale insulin     GI:  Last BM 1/24; mother reports pt with constipation.   Bowel Regimen? [x] Yes   [] No    Weights:   no updated weights available at this time, will continue to monitor weights for changes if any      MEDICATIONS  (STANDING):  acetaminophen     Tablet .. 650 milliGRAM(s) Oral once  cefTRIAXone   IVPB      cefTRIAXone   IVPB 1000 milliGRAM(s) IV Intermittent every 24 hours  dextrose 5% + sodium chloride 0.45%. 1000 milliLiter(s) (60 mL/Hr) IV Continuous <Continuous>  dextrose 5%. 1000 milliLiter(s) (100 mL/Hr) IV Continuous <Continuous>  dextrose 5%. 1000 milliLiter(s) (50 mL/Hr) IV Continuous <Continuous>  dextrose 50% Injectable 25 Gram(s) IV Push once  dextrose 50% Injectable 25 Gram(s) IV Push once  dextrose 50% Injectable 12.5 Gram(s) IV Push once  enoxaparin Injectable 40 milliGRAM(s) SubCutaneous every 24 hours  ferrous    sulfate 325 milliGRAM(s) Oral daily  glucagon  Injectable 1 milliGRAM(s) IntraMuscular once  influenza   Vaccine 0.5 milliLiter(s) IntraMuscular once  insulin lispro (ADMELOG) corrective regimen sliding scale   SubCutaneous three times a day before meals  insulin lispro (ADMELOG) corrective regimen sliding scale   SubCutaneous at bedtime  lacosamide 100 milliGRAM(s) Oral two times a day  polyethylene glycol 3350 17 Gram(s) Oral daily  senna 2 Tablet(s) Oral at bedtime  traZODone 100 milliGRAM(s) Oral at bedtime    MEDICATIONS  (PRN):  dextrose Oral Gel 15 Gram(s) Oral once PRN Blood Glucose LESS THAN 70 milliGRAM(s)/deciliter    Pertinent Labs: 01-26 @ 05:59: Na 143, BUN 6<L>, Cr 0.71, <H>, K+ 3.7, Phos --, Mg --, Alk Phos --, ALT/SGPT --, AST/SGOT --, HbA1c --    A1C with Estimated Average Glucose Result: 4.9 % (01-24-24 @ 06:11)    Finger Sticks:  POCT Blood Glucose.: 133 mg/dL (01-26 @ 07:55)  POCT Blood Glucose.: 116 mg/dL (01-25 @ 20:39)  POCT Blood Glucose.: 118 mg/dL (01-25 @ 16:15)  POCT Blood Glucose.: 119 mg/dL (01-25 @ 11:26)    Skin per nursing documentation: No noted pressure injuries as per documentation.   Edema: No noted edema as per flow sheets.     Estimated Needs:   [x] no change since previous assessment  [] recalculated:     Previous Nutrition Diagnosis: Inadequate Oral Intake   Nutrition Diagnosis is: [x] ongoing  [] resolved [] not applicable     New Nutrition Diagnosis: [x] Not applicable    Nutrition Care Plan:  [x] In Progress  [] Achieved  [] Not applicable    Nutrition Interventions:     Education Provided:       [] Yes:  [] No:     Recommendations:      1) Continue diet free of therapeutic restrictions; defer diet textures/consistencies to team/SLP.  2) Ensure Enlive 2x daily (700 sangeetha and 40 gm protein)   3) Multivitamin if not otherwise contraindicated.   4) Provide encouragement with PO intake, menu selections, and assistance with meals as needed.   5) Monitor tolerance to PO intake; if pt unable to take adequate PO (consistently <50% of meals) consider initiation of supplemental EN if within pt's GOC. Start calorie count only if enteral nutrition within medical GOC.     Monitoring and Evaluation:   Continue to monitor nutritional intake, tolerance to diet prescription, weights, labs, skin integrity    RD remains available upon request and will follow up per protocol    Charis Hastings MS, RD, CDN, CNSC; available via [place] Teams

## 2024-01-26 NOTE — PROGRESS NOTE ADULT - SUBJECTIVE AND OBJECTIVE BOX
DATE OF SERVICE: 01-26-24 @ 13:22    Patient is a 43y old  Female who presents with a chief complaint of EMU admission (26 Jan 2024 12:52)      SUBJECTIVE / OVERNIGHT EVENTS:  No chest pain. No shortness of breath. No complaints. No events overnight. nonverbal, does not follow commands    MEDICATIONS  (STANDING):  cefTRIAXone   IVPB 1000 milliGRAM(s) IV Intermittent every 24 hours  cefTRIAXone   IVPB      dextrose 5% + sodium chloride 0.45%. 1000 milliLiter(s) (60 mL/Hr) IV Continuous <Continuous>  dextrose 5%. 1000 milliLiter(s) (100 mL/Hr) IV Continuous <Continuous>  dextrose 5%. 1000 milliLiter(s) (50 mL/Hr) IV Continuous <Continuous>  dextrose 50% Injectable 25 Gram(s) IV Push once  dextrose 50% Injectable 25 Gram(s) IV Push once  dextrose 50% Injectable 12.5 Gram(s) IV Push once  enoxaparin Injectable 40 milliGRAM(s) SubCutaneous every 24 hours  ferrous    sulfate 325 milliGRAM(s) Oral daily  glucagon  Injectable 1 milliGRAM(s) IntraMuscular once  influenza   Vaccine 0.5 milliLiter(s) IntraMuscular once  insulin lispro (ADMELOG) corrective regimen sliding scale   SubCutaneous three times a day before meals  insulin lispro (ADMELOG) corrective regimen sliding scale   SubCutaneous at bedtime  lacosamide Solution 100 milliGRAM(s) Oral two times a day  lactulose Syrup 20 Gram(s) Oral once  polyethylene glycol 3350 17 Gram(s) Oral two times a day  senna 2 Tablet(s) Oral at bedtime  traZODone 100 milliGRAM(s) Oral at bedtime    MEDICATIONS  (PRN):  dextrose Oral Gel 15 Gram(s) Oral once PRN Blood Glucose LESS THAN 70 milliGRAM(s)/deciliter      Vital Signs Last 24 Hrs  T(C): 36.8 (26 Jan 2024 12:54), Max: 36.9 (26 Jan 2024 00:14)  T(F): 98.3 (26 Jan 2024 12:54), Max: 98.5 (26 Jan 2024 00:14)  HR: 62 (26 Jan 2024 12:54) (54 - 71)  BP: 120/84 (26 Jan 2024 12:54) (91/54 - 120/84)  BP(mean): 66 (26 Jan 2024 04:36) (66 - 66)  RR: 17 (26 Jan 2024 12:54) (16 - 18)  SpO2: 97% (26 Jan 2024 12:54) (93% - 99%)    Parameters below as of 26 Jan 2024 12:54  Patient On (Oxygen Delivery Method): room air      CAPILLARY BLOOD GLUCOSE      POCT Blood Glucose.: 136 mg/dL (26 Jan 2024 11:20)  POCT Blood Glucose.: 133 mg/dL (26 Jan 2024 07:55)  POCT Blood Glucose.: 116 mg/dL (25 Jan 2024 20:39)  POCT Blood Glucose.: 118 mg/dL (25 Jan 2024 16:15)    I&O's Summary    25 Jan 2024 07:01  -  26 Jan 2024 07:00  --------------------------------------------------------  IN: 720 mL / OUT: 0 mL / NET: 720 mL        PHYSICAL EXAM:  GENERAL: NAD, well-developed  HEAD:  Atraumatic, Normocephalic  EYES: EOMI, PERRLA, conjunctiva and sclera clear  NECK: Supple, No JVD  CHEST/LUNG: Clear to auscultation bilaterally; No wheeze  HEART: Regular rate and rhythm; No murmurs, rubs, or gallops  ABDOMEN: Soft, Nontender, Nondistended; Bowel sounds present  EXTREMITIES:  2+ Peripheral Pulses, No clubbing, cyanosis, or edema    LABS:                        12.1   5.23  )-----------( 179      ( 25 Jan 2024 05:08 )             36.4     01-26    143  |  108  |  6<L>  ----------------------------<  123<H>  3.7   |  26  |  0.71    Ca    8.3<L>      26 Jan 2024 05:59  Phos  3.1     01-25  Mg     2.0     01-25            Urinalysis Basic - ( 26 Jan 2024 05:59 )    Color: x / Appearance: x / SG: x / pH: x  Gluc: 123 mg/dL / Ketone: x  / Bili: x / Urobili: x   Blood: x / Protein: x / Nitrite: x   Leuk Esterase: x / RBC: x / WBC x   Sq Epi: x / Non Sq Epi: x / Bacteria: x        RADIOLOGY & ADDITIONAL TESTS:    Imaging Personally Reviewed:    Consultant(s) Notes Reviewed:      Care Discussed with Consultants/Other Providers:

## 2024-01-26 NOTE — PROGRESS NOTE ADULT - NS ATTEND AMEND GEN_ALL_CORE FT
Agree with plan as stated above.   Discontinue Keppra  Continue Lacosamide 100mg PO BID  Follow up with Dr Baum as outpatient    Yao Bass MD  Neurology Attending Physician
EEG has been stable with no seizures recorded.  Events of diffuse body tremors likely related to distress, possibly related to pain/symptoms from cystitis?  +UTI and blood culture, appreciate IM consult, recommend transfer to IM as primary service to manage infection.    Yao Bass MD  Neurology Attending Physician

## 2024-01-26 NOTE — EEG REPORT - NS EEG TEXT BOX
DAY 4 	START: 1/25/2024  08:00 AM     	END: 1/26/2024  09:59 AM   	DURATION: 25 HR  58 MN    DAILY EEG VISUAL ANALYSIS    The background was continuous, symmetric, spontaneously variable and reactive. During wakefulness, the posterior dominant rhythm was absent.    BACKGROUND SLOWING:  Diffuse polymorphic delta and theta activity was present.    FOCAL SLOWING:   None was present.    SLEEP BACKGROUND:  Drowsiness was characterized by fragmentation, attenuation, and slowing of the background activity.    No stage 2 sleep architecture was seen.    OTHER NON-EPILEPTIFORM FINDINGS:  None were present.    ACTIVATION PROCEDURES:   Hyperventilation was not performed.    Photic stimulation was not performed.    INTERICTAL EPILEPTIFORM ACTIVITY:   Frequent bifrontal sharp waves, max F3/F4, often asymmetrical favoring left > right, at times in couple secs periodic bursts up to 1.5 hz.    EVENTS:  Button press events with grimacing without correlating EEG abnormality.    ARTIFACTS:  Intermittent myogenic and movement artifacts were noted.    ECG:  The heart rate on single channel ECG was predominantly between 70-80 BPM.    ASMs:   Discontinued Keppra  Continue Vimpat    -------------------------------------------------------------------------------------------------------------------------------------------------------  EEG SUMMARY:  Abnormal EEG in the awake, drowsy and asleep states.  •	Frequent bifrontal sharp waves, max F3/F4, often asymmetrical favoring left > right, at times in couple secs periodic bursts up to 1.5 hz.  •	Moderate diffuse background slowing.    -------------------------------------------------------------------------------------------------------------------------------------------------------  IMPRESSION/CLINICAL CORRELATE:  This is an abnormal EEG record.   •	Increased risk for seizures, left frontal predominance of bifrontal discharges suggest likely left frontal focus with rapid synchrony.  •	Moderate diffuse cerebral dysfunction, nonspecific.

## 2024-01-26 NOTE — PROGRESS NOTE ADULT - ASSESSMENT
42 y/o female with PMH of Down syndrome, dementia, nonverbal, decreased mobility, and epilepsy who presents for elective EMU admission for increased seizure frequency. CODE BLUE in admitting for seizure activity, s/p 10mg of midazolam, 2mg of ativan, and keppra 1g. EEG report with increased risk for seizures left > right, but no seizure activity recorded. Episodes appear to primarily be behavioral. Tapered off home Keppra and continued on home vimpat. UA +, blood culture positive with gram neg rods. Ucx pending result. Febrile, no leukocytosis. Started on CTX. ID and IM consulted for reccs.     Impression: Myoclonic jerks with O2 desaturation with concern for seizures. EEG negative thus far for seizures, but patient at high risk for seizures. UA +     Plan:   1. Seizures   [x] EEG removed- explained to mother at bedside no further indication for EEG as monitoring has been unremarkable.   [x] Monitor off home Keppra   [x] c/w home vimpat 100mg bid  [x] vimpat level 6.32 and keppra level 57.3  [x] Rescue: 1st line: IV 1 mg Ativan, 2nd line IV Keppra 1g  [x] Maintain seizure and fall precautions   [x] neuro checks per routine     2. UTI, regors, possible urosepsis   [x] CTX for UTI (1/23- )  [x] UA + on 1/23   [x] blood culture + for gram neg rods  [x] admitted to medicine service  [x] CT abdomen/pelvis showing acute cystitis   [x] daily CBC to trend WBC count     3. Dysphagia  [x] failed swallow eval at admission    [x] tolerating Pureed diet  [x] c/w IVF for hydration     4. Hypernatremia  [x] resolved on d5      CORE MEASURES:        AED levels [x] Sent [] Pending [] Resulted     LFTs [] normal [x] elevated      Plan and education provided to [x] patient [x] mother at bedside via  seizure education provided.     Seizure Semiology  [x] Tonic clonic  [] Clonic  [x] Tonic  [] Unresponsive  [] Focal with impaired awareness  [] Focal without impaired awareness    Obtain screening lower extremity venous ultrasound in patients who meet 1 or more of the following criteria as patient is high risk for DVT/PE on admission:   [] History of DVT/PE  []Hypercoagulable states (Factor V Leiden, Cancer, OCP, etc. )  []Prolonged immobility (hemiplegia/hemiparesis/post operative or any other extended immobilization)  [] Transferred from outside facility (Rehab or Long term care) 42 y/o female with PMH of Down syndrome, dementia, nonverbal, decreased mobility, and epilepsy who presents for elective EMU admission for increased seizure frequency. CODE BLUE in admitting for seizure activity, s/p 10mg of midazolam, 2mg of ativan, and keppra 1g. EEG report with increased risk for seizures left > right, but no seizure activity recorded. Episodes appear to primarily be behavioral. Tapered off home Keppra and continued on home vimpat. UA +, blood culture positive with gram neg rods. Ucx pending result. Febrile, no leukocytosis. Started on CTX. ID and IM consulted for reccs.     Impression: Myoclonic jerks with O2 desaturation with concern for seizures. EEG negative thus far for seizures, but patient at high risk for seizures. UA +     Plan:   1. Seizures   [x] EEG removed- explained to mother at bedside no further indication for EEG as monitoring has been unremarkable. Explained that events of diffuse body tremors likely related to distress, possibly related to pain/symptoms from cystitis- nonepileptic in nature.   [x] Monitor off home Keppra   [x] c/w home vimpat 100mg bid  [x] vimpat level 6.32 and keppra level 57.3  [x] Rescue: 1st line: IV 1 mg Ativan, 2nd line IV Keppra 1g  [x] Maintain seizure and fall precautions   [x] neuro checks per routine     2. UTI, regors, possible urosepsis   [x] CTX for UTI (1/23- )  [x] UA + on 1/23   [x] blood culture + for gram neg rods  [x] admitted to medicine service  [x] CT abdomen/pelvis showing acute cystitis   [x] daily CBC to trend WBC count     3. Dysphagia  [x] failed swallow eval at admission    [x] tolerating Pureed diet  [x] c/w IVF for hydration     4. Hypernatremia  [x] resolved on d5      CORE MEASURES:        AED levels [x] Sent [] Pending [] Resulted     LFTs [] normal [x] elevated      Plan and education provided to [x] patient [x] mother at bedside via  seizure education provided.     Seizure Semiology  [x] Tonic clonic  [] Clonic  [x] Tonic  [] Unresponsive  [] Focal with impaired awareness  [] Focal without impaired awareness    Obtain screening lower extremity venous ultrasound in patients who meet 1 or more of the following criteria as patient is high risk for DVT/PE on admission:   [] History of DVT/PE  []Hypercoagulable states (Factor V Leiden, Cancer, OCP, etc. )  []Prolonged immobility (hemiplegia/hemiparesis/post operative or any other extended immobilization)  [] Transferred from outside facility (Rehab or Long term care)

## 2024-01-26 NOTE — PROGRESS NOTE ADULT - SUBJECTIVE AND OBJECTIVE BOX
Follow Up:  bacteremia    Interval History/ROS:  Overnight: No acute events.  Patient remains afebrile.  Otherwise hemodynamically stable on room air.  Latest labs show no leukocytosis, BMP with renal function within normal limits.    Patient seen examined at bedside.  Appears comfortable.  Nonverbal–unable to obtain ROS.    Allergies  No Known Allergies        ANTIMICROBIALS:  cefTRIAXone   IVPB    cefTRIAXone   IVPB 1000 every 24 hours      OTHER MEDS:  MEDICATIONS  (STANDING):  dextrose 50% Injectable 25 once  dextrose 50% Injectable 12.5 once  dextrose 50% Injectable 25 once  dextrose Oral Gel 15 once PRN  enoxaparin Injectable 40 every 24 hours  glucagon  Injectable 1 once  influenza   Vaccine 0.5 once  insulin lispro (ADMELOG) corrective regimen sliding scale  three times a day before meals  insulin lispro (ADMELOG) corrective regimen sliding scale  at bedtime  lacosamide Solution 100 two times a day  lactulose Syrup 20 once  polyethylene glycol 3350 17 two times a day  senna 2 at bedtime  traZODone 100 at bedtime      Vital Signs Last 24 Hrs  T(C): 36.8 (26 Jan 2024 12:54), Max: 36.9 (26 Jan 2024 00:14)  T(F): 98.3 (26 Jan 2024 12:54), Max: 98.5 (26 Jan 2024 00:14)  HR: 62 (26 Jan 2024 12:54) (54 - 71)  BP: 120/84 (26 Jan 2024 12:54) (91/54 - 120/84)  BP(mean): 66 (26 Jan 2024 04:36) (66 - 66)  RR: 17 (26 Jan 2024 12:54) (16 - 18)  SpO2: 97% (26 Jan 2024 12:54) (93% - 99%)    Parameters below as of 26 Jan 2024 12:54  Patient On (Oxygen Delivery Method): room air        PHYSICAL EXAM:  GA: NAD, AOx3  HEENT: oral cavity no lesion  CV: nl S1/S2, no RMG  Lungs: CTAB, No distress  Abd: BS+, soft, nontender, no rebounding pain  Ext: no edema  Neuro: No focal deficits  Skin: Intact  IV: no phlebitis                              12.1   5.23  )-----------( 179      ( 25 Jan 2024 05:08 )             36.4       01-26    143  |  108  |  6<L>  ----------------------------<  123<H>  3.7   |  26  |  0.71    Ca    8.3<L>      26 Jan 2024 05:59  Phos  3.1     01-25  Mg     2.0     01-25        Urinalysis Basic - ( 26 Jan 2024 05:59 )    Color: x / Appearance: x / SG: x / pH: x  Gluc: 123 mg/dL / Ketone: x  / Bili: x / Urobili: x   Blood: x / Protein: x / Nitrite: x   Leuk Esterase: x / RBC: x / WBC x   Sq Epi: x / Non Sq Epi: x / Bacteria: x        MICROBIOLOGY:  v    Culture - Blood (collected 23 Jan 2024 10:40)  Source: .Blood Blood-Venous  Gram Stain (24 Jan 2024 14:26):    Growth in aerobic bottle: Gram Negative Rods    Growth in anaerobic bottle: Gram Negative Rods  Final Report (25 Jan 2024 18:29):    Growth in aerobic and anaerobic bottles: Escherichia coli    See previous culture [id number]    Culture - Blood (collected 23 Jan 2024 10:30)  Source: .Blood Blood  Gram Stain (24 Jan 2024 03:15):    Growth in aerobic bottle: Gram Negative Rods  Final Report (25 Jan 2024 16:35):    Growth in aerobic bottle: Escherichia coli    Direct identification is available within approximately 3-5    hours either by Blood Panel Multiplexed PCR or Direct    MALDI-TOF. Details: [email or web address]  Organism: Blood Culture PCR  Escherichia coli (25 Jan 2024 16:35)  Organism: Escherichia coli (25 Jan 2024 16:35)      Method Type: MATHEUS      -  Ampicillin: S <=8 These ampicillin results predict results for amoxicillin      -  Ampicillin/Sulbactam: S <=4/2      -  Aztreonam: S <=4      -  Cefazolin: S <=2      -  Cefepime: S <=2      -  Cefoxitin: S <=8      -  Ceftriaxone: S <=1      -  Ciprofloxacin: S <=0.25      -  Ertapenem: S <=0.5      -  Gentamicin: S <=2      -  Imipenem: S <=1      -  Levofloxacin: S <=0.5      -  Meropenem: S <=1      -  Piperacillin/Tazobactam: S <=8      -  Tobramycin: S <=2      -  Trimethoprim/Sulfamethoxazole: S <=0.5/9.5  Organism: Blood Culture PCR (25 Jan 2024 16:35)      Method Type: PCR      -  Escherichia coli: Detec    Culture - Urine (collected 23 Jan 2024 10:07)  Source: Catheterized Catheterized  Final Report (25 Jan 2024 19:51):    >100,000 CFU/ml Escherichia coli  Organism: Escherichia coli (25 Jan 2024 19:51)  Organism: Escherichia coli (25 Jan 2024 19:51)      Method Type: MATHEUS      -  Amoxicillin/Clavulanic Acid: S <=8/4      -  Ampicillin: S <=8 These ampicillin results predict results for amoxicillin      -  Ampicillin/Sulbactam: S <=4/2      -  Aztreonam: S <=4      -  Cefazolin: S <=2 For uncomplicated UTI with K. pneumoniae, E. coli, or P. mirablis: MATHEUS <=16 is sensitive and MATHEUS >=32 is resistant. This also predicts results for oral agents cefaclor, cefdinir, cefpodoxime, cefprozil, cefuroxime axetil, cephalexin and locarbef for uncomplicated UTI. Note that some isolates may be susceptible to these agents while testing resistant to cefazolin.      -  Cefepime: S <=2      -  Cefoxitin: S <=8      -  Ceftriaxone: S <=1      -  Cefuroxime: S <=4      -  Ciprofloxacin: S <=0.25      -  Ertapenem: S <=0.5      -  Gentamicin: S <=2      -  Imipenem: S <=1      -  Levofloxacin: S <=0.5      -  Meropenem: S <=1      -  Nitrofurantoin: S <=32 Should not be used to treat pyelonephritis      -  Piperacillin/Tazobactam: S <=8      -  Tobramycin: S <=2      -  Trimethoprim/Sulfamethoxazole: S <=0.5/9.5                    RADIOLOGY:  Imaging reviewed

## 2024-01-26 NOTE — PROGRESS NOTE ADULT - SUBJECTIVE AND OBJECTIVE BOX
THE PATIENT WAS SEEN AND EXAMINED BY ME WITH THE HOUSESTAFF DURING MORNING ROUNDS.   HPI: 42 y/o female with PMH of Down syndrome, dementia, nonverbal, decreased mobility, epilepsy who presents for elective EMU admission.  First event was GTC in Oct 2022, patient was seen at [facility] at the time. EEG (10/21/22) No seizures recorded, but noted to have with L>R frontal spikes. CTH non con was normal. She was started on VPA while inpatient. Patient was transitioned to Keppra in outpatient setting by Dr. Sourav Cardenas.  Patient was taking Keppra 750mg BID, still having seizures per family- did not tolerate increase. Patient now follows with Dr. Baum, Vimpat 100mg BID was  added.   Per family, seizure frequency is increasing, however unclear if events are true seizures or behavioral/ agitation related. EMU admission was recommended for event characterization and medication adjustment.   Patient seen at  in 12/18/23 for increased seizure frequency, treated for UTI and kept on same dose ASMs.     Home ASM's   Morning-Keppra 750 mg, Vimpat 100 mg   Afternoon-Vimpat 100 mg   Evening-Keppra 750 mg, Trazodone 100 mg     SUBJECTIVE: Afebrile overnight.  No new neurologic complaints.  ROS: unable to be obtained due to patient's mental status.       ID# [id number]    cefTRIAXone   IVPB 1000 milliGRAM(s) IV Intermittent every 24 hours  cefTRIAXone   IVPB      dextrose 5% + sodium chloride 0.45%. 1000 milliLiter(s) IV Continuous <Continuous>  dextrose 5%. 1000 milliLiter(s) IV Continuous <Continuous>  dextrose 5%. 1000 milliLiter(s) IV Continuous <Continuous>  dextrose 50% Injectable 25 Gram(s) IV Push once  dextrose 50% Injectable 25 Gram(s) IV Push once  dextrose 50% Injectable 12.5 Gram(s) IV Push once  dextrose Oral Gel 15 Gram(s) Oral once PRN  enoxaparin Injectable 40 milliGRAM(s) SubCutaneous every 24 hours  ferrous    sulfate 325 milliGRAM(s) Oral daily  glucagon  Injectable 1 milliGRAM(s) IntraMuscular once  influenza   Vaccine 0.5 milliLiter(s) IntraMuscular once  insulin lispro (ADMELOG) corrective regimen sliding scale   SubCutaneous three times a day before meals  insulin lispro (ADMELOG) corrective regimen sliding scale   SubCutaneous at bedtime  lacosamide Solution 100 milliGRAM(s) Oral two times a day  polyethylene glycol 3350 17 Gram(s) Oral daily  senna 2 Tablet(s) Oral at bedtime  traZODone 100 milliGRAM(s) Oral at bedtime      Physical Exam: Neurological Exam:  Mental Status: eyes open, alert, tracks examiner, no verbal output, does not follow simple or complex commands   Cranial Nerves: PERR, EOMI, blink to threat intact b/l. No facial droop. Tongue midline. Oculocephalic intact, corneal intact  Motor: Tone: increased. Strength: limited exam. 2/5 in UE and 1/5 in LE    Dysmetria: did not participate   Sensation: withdraws x 4 to all extremities      LABS:                        12.1   5.23  )-----------( 179      ( 25 Jan 2024 05:08 )             36.4    01-26    143  |  108  |  6<L>  ----------------------------<  123<H>  3.7   |  26  |  0.71    Ca    8.3<L>      26 Jan 2024 05:59  Phos  3.1     01-25  Mg     2.0     01-25    COVID-19 PCR: NotDetec (22 Jan 2024 18:53)    EEG 1/26:   Abnormal EEG in the awake, drowsy and asleep states.  Frequent bifrontal sharp waves, max F3/F4, often asymmetrical favoring left > right, at times in couple secs periodic bursts up to 1.5 hz. Moderate diffuse background slowing.    IMPRESSION/CLINICAL CORRELATE:  This is an abnormal EEG record. Increased risk for seizures, left frontal predominance of bifrontal discharges suggest likely left frontal focus with rapid synchrony. Moderate diffuse cerebral dysfunction, nonspecific.

## 2024-01-26 NOTE — PROGRESS NOTE ADULT - ASSESSMENT
44 y/o female with PMH of Down syndrome, dementia, nonverbal, decreased mobility, epilepsy who presents for elective EMU admission.    Epilepsy  - no acute seizures per neurology  - cont vimpat    fever metabolic encephalopathy  - UTI  -  sepsis  - Ecoli Bacteremia and cystitis  - c/w ceftriaxone  - follow up cultures  - ID is following    dysphagia  - swallow eval  -  puree diet    constipation  - senna and miralax    anemia  - iron studies  - c/w iron    dvt px    d/w mother

## 2024-01-27 LAB
ANION GAP SERPL CALC-SCNC: 11 MMOL/L — SIGNIFICANT CHANGE UP (ref 5–17)
BUN SERPL-MCNC: 8 MG/DL — SIGNIFICANT CHANGE UP (ref 7–23)
CALCIUM SERPL-MCNC: 8.5 MG/DL — SIGNIFICANT CHANGE UP (ref 8.4–10.5)
CHLORIDE SERPL-SCNC: 107 MMOL/L — SIGNIFICANT CHANGE UP (ref 96–108)
CO2 SERPL-SCNC: 25 MMOL/L — SIGNIFICANT CHANGE UP (ref 22–31)
CREAT SERPL-MCNC: 0.72 MG/DL — SIGNIFICANT CHANGE UP (ref 0.5–1.3)
EGFR: 106 ML/MIN/1.73M2 — SIGNIFICANT CHANGE UP
GLUCOSE BLDC GLUCOMTR-MCNC: 122 MG/DL — HIGH (ref 70–99)
GLUCOSE SERPL-MCNC: 109 MG/DL — HIGH (ref 70–99)
HCT VFR BLD CALC: 32 % — LOW (ref 34.5–45)
HGB BLD-MCNC: 11 G/DL — LOW (ref 11.5–15.5)
MCHC RBC-ENTMCNC: 34.4 GM/DL — SIGNIFICANT CHANGE UP (ref 32–36)
MCHC RBC-ENTMCNC: 34.9 PG — HIGH (ref 27–34)
MCV RBC AUTO: 101.6 FL — HIGH (ref 80–100)
NRBC # BLD: 0 /100 WBCS — SIGNIFICANT CHANGE UP (ref 0–0)
PLATELET # BLD AUTO: 210 K/UL — SIGNIFICANT CHANGE UP (ref 150–400)
POTASSIUM SERPL-MCNC: 3.7 MMOL/L — SIGNIFICANT CHANGE UP (ref 3.5–5.3)
POTASSIUM SERPL-SCNC: 3.7 MMOL/L — SIGNIFICANT CHANGE UP (ref 3.5–5.3)
RBC # BLD: 3.15 M/UL — LOW (ref 3.8–5.2)
RBC # FLD: 12.1 % — SIGNIFICANT CHANGE UP (ref 10.3–14.5)
SODIUM SERPL-SCNC: 143 MMOL/L — SIGNIFICANT CHANGE UP (ref 135–145)
WBC # BLD: 3.56 K/UL — LOW (ref 3.8–10.5)
WBC # FLD AUTO: 3.56 K/UL — LOW (ref 3.8–10.5)

## 2024-01-27 RX ADMIN — LACOSAMIDE 100 MILLIGRAM(S): 50 TABLET ORAL at 17:04

## 2024-01-27 RX ADMIN — Medication 100 MILLIGRAM(S): at 21:08

## 2024-01-27 RX ADMIN — ENOXAPARIN SODIUM 40 MILLIGRAM(S): 100 INJECTION SUBCUTANEOUS at 05:08

## 2024-01-27 RX ADMIN — LACOSAMIDE 100 MILLIGRAM(S): 50 TABLET ORAL at 05:08

## 2024-01-27 RX ADMIN — CEFTRIAXONE 100 MILLIGRAM(S): 500 INJECTION, POWDER, FOR SOLUTION INTRAMUSCULAR; INTRAVENOUS at 14:06

## 2024-01-27 RX ADMIN — Medication 325 MILLIGRAM(S): at 11:37

## 2024-01-27 RX ADMIN — POLYETHYLENE GLYCOL 3350 17 GRAM(S): 17 POWDER, FOR SOLUTION ORAL at 17:06

## 2024-01-27 RX ADMIN — POLYETHYLENE GLYCOL 3350 17 GRAM(S): 17 POWDER, FOR SOLUTION ORAL at 05:08

## 2024-01-27 NOTE — PROGRESS NOTE ADULT - ASSESSMENT
42 y/o female with PMH of Down syndrome, dementia, nonverbal, decreased mobility, epilepsy who presents for elective EMU admission.    Epilepsy  - no acute seizures per neurology  - cont vimpat    myovlonic jerks  - neurology followup  - observe    fever metabolic encephalopathy  - UTI  -  sepsis  - Ecoli Bacteremia and cystitis  - c/w ceftriaxone  - follow up cultures  - ID is following    dysphagia  - swallow eval  -  puree diet    constipation  - senna and miralax    anemia  - iron studies c/w ACD  - c/w iron    dvt px    d/w mother

## 2024-01-27 NOTE — PROGRESS NOTE ADULT - SUBJECTIVE AND OBJECTIVE BOX
DATE OF SERVICE: 01-27-24 @ 13:00    Patient is a 43y old  Female who presents with a chief complaint of EMU admission (26 Jan 2024 14:35)      SUBJECTIVE / OVERNIGHT EVENTS:  more alert and responsive, eating well , fed by mother.  having upper ext myoclonic jerks/twiches    MEDICATIONS  (STANDING):  cefTRIAXone   IVPB 1000 milliGRAM(s) IV Intermittent every 24 hours  cefTRIAXone   IVPB      dextrose 5%. 1000 milliLiter(s) (100 mL/Hr) IV Continuous <Continuous>  dextrose 5%. 1000 milliLiter(s) (50 mL/Hr) IV Continuous <Continuous>  dextrose 50% Injectable 25 Gram(s) IV Push once  dextrose 50% Injectable 25 Gram(s) IV Push once  dextrose 50% Injectable 12.5 Gram(s) IV Push once  enoxaparin Injectable 40 milliGRAM(s) SubCutaneous every 24 hours  ferrous    sulfate 325 milliGRAM(s) Oral daily  glucagon  Injectable 1 milliGRAM(s) IntraMuscular once  influenza   Vaccine 0.5 milliLiter(s) IntraMuscular once  lacosamide Solution 100 milliGRAM(s) Oral two times a day  polyethylene glycol 3350 17 Gram(s) Oral two times a day  senna 2 Tablet(s) Oral at bedtime  traZODone 100 milliGRAM(s) Oral at bedtime    MEDICATIONS  (PRN):  dextrose Oral Gel 15 Gram(s) Oral once PRN Blood Glucose LESS THAN 70 milliGRAM(s)/deciliter      Vital Signs Last 24 Hrs  T(C): 36.8 (27 Jan 2024 12:51), Max: 36.8 (27 Jan 2024 00:16)  T(F): 98.3 (27 Jan 2024 12:51), Max: 98.3 (27 Jan 2024 12:51)  HR: 80 (27 Jan 2024 12:51) (54 - 80)  BP: 95/66 (27 Jan 2024 12:51) (95/66 - 121/81)  BP(mean): 78 (27 Jan 2024 04:15) (76 - 78)  RR: 18 (27 Jan 2024 12:51) (15 - 18)  SpO2: 92% (27 Jan 2024 12:51) (92% - 97%)    Parameters below as of 27 Jan 2024 04:15  Patient On (Oxygen Delivery Method): room air      CAPILLARY BLOOD GLUCOSE      POCT Blood Glucose.: 122 mg/dL (27 Jan 2024 07:34)  POCT Blood Glucose.: 112 mg/dL (26 Jan 2024 21:03)  POCT Blood Glucose.: 144 mg/dL (26 Jan 2024 16:10)    I&O's Summary    26 Jan 2024 07:01  -  27 Jan 2024 07:00  --------------------------------------------------------  IN: 960 mL / OUT: 1150 mL / NET: -190 mL        PHYSICAL EXAM:  GENERAL: NAD, well-developed  HEAD:  Atraumatic, Normocephalic  EYES: EOMI, PERRLA, conjunctiva and sclera clear  NECK: Supple, No JVD  CHEST/LUNG: Clear to auscultation bilaterally; No wheeze  HEART: Regular rate and rhythm; No murmurs, rubs, or gallops  ABDOMEN: Soft, Nontender, Nondistended; Bowel sounds present  EXTREMITIES:  2+ Peripheral Pulses, No clubbing, cyanosis, or edema  PSYCH: AAOx0  NEUROLOGY: non-verbal does not follow commands  SKIN: No rashes or lesions    LABS:                        11.0   3.56  )-----------( 210      ( 27 Jan 2024 06:17 )             32.0     01-27    143  |  107  |  8   ----------------------------<  109<H>  3.7   |  25  |  0.72    Ca    8.5      27 Jan 2024 06:15            Urinalysis Basic - ( 27 Jan 2024 06:15 )    Color: x / Appearance: x / SG: x / pH: x  Gluc: 109 mg/dL / Ketone: x  / Bili: x / Urobili: x   Blood: x / Protein: x / Nitrite: x   Leuk Esterase: x / RBC: x / WBC x   Sq Epi: x / Non Sq Epi: x / Bacteria: x        RADIOLOGY & ADDITIONAL TESTS:    Imaging Personally Reviewed:    Consultant(s) Notes Reviewed:      Care Discussed with Consultants/Other Providers:

## 2024-01-28 RX ORDER — ACETAMINOPHEN 500 MG
650 TABLET ORAL EVERY 6 HOURS
Refills: 0 | Status: DISCONTINUED | OUTPATIENT
Start: 2024-01-28 | End: 2024-01-31

## 2024-01-28 RX ADMIN — Medication 325 MILLIGRAM(S): at 14:50

## 2024-01-28 RX ADMIN — LACOSAMIDE 100 MILLIGRAM(S): 50 TABLET ORAL at 05:06

## 2024-01-28 RX ADMIN — CEFTRIAXONE 100 MILLIGRAM(S): 500 INJECTION, POWDER, FOR SOLUTION INTRAMUSCULAR; INTRAVENOUS at 14:50

## 2024-01-28 RX ADMIN — ENOXAPARIN SODIUM 40 MILLIGRAM(S): 100 INJECTION SUBCUTANEOUS at 05:06

## 2024-01-28 RX ADMIN — POLYETHYLENE GLYCOL 3350 17 GRAM(S): 17 POWDER, FOR SOLUTION ORAL at 16:47

## 2024-01-28 RX ADMIN — LACOSAMIDE 100 MILLIGRAM(S): 50 TABLET ORAL at 17:02

## 2024-01-28 RX ADMIN — Medication 100 MILLIGRAM(S): at 21:36

## 2024-01-28 RX ADMIN — SENNA PLUS 2 TABLET(S): 8.6 TABLET ORAL at 21:36

## 2024-01-28 RX ADMIN — Medication 650 MILLIGRAM(S): at 17:01

## 2024-01-28 NOTE — PROGRESS NOTE ADULT - ASSESSMENT
44 y/o female with PMH of Down syndrome, dementia, nonverbal, decreased mobility, epilepsy who presents for elective EMU admission.    Epilepsy  - no acute seizures per neurology  - cont vimpat    myovlonic jerks  - neurology followup  - observe    fever metabolic encephalopathy  - UTI  -  sepsis  - Ecoli Bacteremia and cystitis  - c/w ceftriaxone  - follow up cultures  - ID is following    dysphagia  - swallow eval  -  puree diet    constipation  - senna and miralax    anemia  - iron studies c/w ACD  - c/w iron    dvt px    d/w mother

## 2024-01-28 NOTE — PROGRESS NOTE ADULT - SUBJECTIVE AND OBJECTIVE BOX
DATE OF SERVICE: 01-28-24 @ 13:44    Patient is a 43y old  Female who presents with a chief complaint of EMU admission (27 Jan 2024 12:59)      SUBJECTIVE / OVERNIGHT EVENTS:  still having twitches more awake and alert, eating well    MEDICATIONS  (STANDING):  cefTRIAXone   IVPB 1000 milliGRAM(s) IV Intermittent every 24 hours  cefTRIAXone   IVPB      dextrose 5%. 1000 milliLiter(s) (100 mL/Hr) IV Continuous <Continuous>  dextrose 5%. 1000 milliLiter(s) (50 mL/Hr) IV Continuous <Continuous>  dextrose 50% Injectable 25 Gram(s) IV Push once  dextrose 50% Injectable 12.5 Gram(s) IV Push once  dextrose 50% Injectable 25 Gram(s) IV Push once  enoxaparin Injectable 40 milliGRAM(s) SubCutaneous every 24 hours  ferrous    sulfate 325 milliGRAM(s) Oral daily  glucagon  Injectable 1 milliGRAM(s) IntraMuscular once  influenza   Vaccine 0.5 milliLiter(s) IntraMuscular once  lacosamide Solution 100 milliGRAM(s) Oral two times a day  polyethylene glycol 3350 17 Gram(s) Oral two times a day  senna 2 Tablet(s) Oral at bedtime  traZODone 100 milliGRAM(s) Oral at bedtime    MEDICATIONS  (PRN):  dextrose Oral Gel 15 Gram(s) Oral once PRN Blood Glucose LESS THAN 70 milliGRAM(s)/deciliter      Vital Signs Last 24 Hrs  T(C): 36.4 (28 Jan 2024 11:47), Max: 37.2 (27 Jan 2024 21:20)  T(F): 97.5 (28 Jan 2024 11:47), Max: 98.9 (27 Jan 2024 21:20)  HR: 69 (28 Jan 2024 11:47) (68 - 83)  BP: 106/63 (28 Jan 2024 11:47) (91/70 - 108/77)  BP(mean): --  RR: 18 (28 Jan 2024 11:47) (18 - 18)  SpO2: 95% (28 Jan 2024 11:47) (94% - 97%)    Parameters below as of 28 Jan 2024 05:07  Patient On (Oxygen Delivery Method): room air      CAPILLARY BLOOD GLUCOSE        I&O's Summary    27 Jan 2024 07:01  -  28 Jan 2024 07:00  --------------------------------------------------------  IN: 180 mL / OUT: 2300 mL / NET: -2120 mL        PHYSICAL EXAM:  GENERAL: NAD, well-developed  HEAD:  Atraumatic, Normocephalic  EYES: EOMI, PERRLA, conjunctiva and sclera clear  NECK: Supple, No JVD  CHEST/LUNG: Clear to auscultation bilaterally; No wheeze  HEART: Regular rate and rhythm; No murmurs, rubs, or gallops  ABDOMEN: Soft, Nontender, Nondistended; Bowel sounds present  EXTREMITIES:  2+ Peripheral Pulses, No clubbing, cyanosis, or edema  PSYCH: AAOx1  NEUROLOGY: non-verbal , does not follow commands  SKIN: No rashes or lesions    LABS:                        11.0   3.56  )-----------( 210      ( 27 Jan 2024 06:17 )             32.0     01-27    143  |  107  |  8   ----------------------------<  109<H>  3.7   |  25  |  0.72    Ca    8.5      27 Jan 2024 06:15            Urinalysis Basic - ( 27 Jan 2024 06:15 )    Color: x / Appearance: x / SG: x / pH: x  Gluc: 109 mg/dL / Ketone: x  / Bili: x / Urobili: x   Blood: x / Protein: x / Nitrite: x   Leuk Esterase: x / RBC: x / WBC x   Sq Epi: x / Non Sq Epi: x / Bacteria: x        RADIOLOGY & ADDITIONAL TESTS:    Imaging Personally Reviewed:    Consultant(s) Notes Reviewed:      Care Discussed with Consultants/Other Providers:

## 2024-01-29 ENCOUNTER — TRANSCRIPTION ENCOUNTER (OUTPATIENT)
Age: 44
End: 2024-01-29

## 2024-01-29 LAB
ALBUMIN SERPL ELPH-MCNC: 3.5 G/DL — SIGNIFICANT CHANGE UP (ref 3.3–5)
ALP SERPL-CCNC: 89 U/L — SIGNIFICANT CHANGE UP (ref 40–120)
ALT FLD-CCNC: 61 U/L — HIGH (ref 10–45)
AMMONIA BLD-MCNC: 32 UMOL/L — SIGNIFICANT CHANGE UP (ref 11–55)
ANION GAP SERPL CALC-SCNC: 12 MMOL/L — SIGNIFICANT CHANGE UP (ref 5–17)
AST SERPL-CCNC: 54 U/L — HIGH (ref 10–40)
BILIRUB SERPL-MCNC: 0.4 MG/DL — SIGNIFICANT CHANGE UP (ref 0.2–1.2)
BUN SERPL-MCNC: 20 MG/DL — SIGNIFICANT CHANGE UP (ref 7–23)
CALCIUM SERPL-MCNC: 8.7 MG/DL — SIGNIFICANT CHANGE UP (ref 8.4–10.5)
CHLORIDE SERPL-SCNC: 103 MMOL/L — SIGNIFICANT CHANGE UP (ref 96–108)
CO2 SERPL-SCNC: 24 MMOL/L — SIGNIFICANT CHANGE UP (ref 22–31)
CREAT SERPL-MCNC: 0.78 MG/DL — SIGNIFICANT CHANGE UP (ref 0.5–1.3)
EGFR: 97 ML/MIN/1.73M2 — SIGNIFICANT CHANGE UP
GLUCOSE BLDC GLUCOMTR-MCNC: 148 MG/DL — HIGH (ref 70–99)
GLUCOSE SERPL-MCNC: 133 MG/DL — HIGH (ref 70–99)
HCT VFR BLD CALC: 38.9 % — SIGNIFICANT CHANGE UP (ref 34.5–45)
HGB BLD-MCNC: 13.4 G/DL — SIGNIFICANT CHANGE UP (ref 11.5–15.5)
MAGNESIUM SERPL-MCNC: 2.2 MG/DL — SIGNIFICANT CHANGE UP (ref 1.6–2.6)
MCHC RBC-ENTMCNC: 34.4 GM/DL — SIGNIFICANT CHANGE UP (ref 32–36)
MCHC RBC-ENTMCNC: 34.7 PG — HIGH (ref 27–34)
MCV RBC AUTO: 100.8 FL — HIGH (ref 80–100)
NRBC # BLD: 0 /100 WBCS — SIGNIFICANT CHANGE UP (ref 0–0)
PHOSPHATE SERPL-MCNC: 3.4 MG/DL — SIGNIFICANT CHANGE UP (ref 2.5–4.5)
PLATELET # BLD AUTO: 345 K/UL — SIGNIFICANT CHANGE UP (ref 150–400)
POTASSIUM SERPL-MCNC: 4 MMOL/L — SIGNIFICANT CHANGE UP (ref 3.5–5.3)
POTASSIUM SERPL-SCNC: 4 MMOL/L — SIGNIFICANT CHANGE UP (ref 3.5–5.3)
PROT SERPL-MCNC: 7.2 G/DL — SIGNIFICANT CHANGE UP (ref 6–8.3)
RBC # BLD: 3.86 M/UL — SIGNIFICANT CHANGE UP (ref 3.8–5.2)
RBC # FLD: 12.1 % — SIGNIFICANT CHANGE UP (ref 10.3–14.5)
SODIUM SERPL-SCNC: 139 MMOL/L — SIGNIFICANT CHANGE UP (ref 135–145)
WBC # BLD: 8.64 K/UL — SIGNIFICANT CHANGE UP (ref 3.8–10.5)
WBC # FLD AUTO: 8.64 K/UL — SIGNIFICANT CHANGE UP (ref 3.8–10.5)

## 2024-01-29 RX ORDER — ACETAMINOPHEN 500 MG
700 TABLET ORAL ONCE
Refills: 0 | Status: COMPLETED | OUTPATIENT
Start: 2024-01-29 | End: 2024-01-29

## 2024-01-29 RX ORDER — SODIUM CHLORIDE 9 MG/ML
1000 INJECTION INTRAMUSCULAR; INTRAVENOUS; SUBCUTANEOUS ONCE
Refills: 0 | Status: COMPLETED | OUTPATIENT
Start: 2024-01-29 | End: 2024-01-29

## 2024-01-29 RX ORDER — SODIUM CHLORIDE 9 MG/ML
500 INJECTION INTRAMUSCULAR; INTRAVENOUS; SUBCUTANEOUS ONCE
Refills: 0 | Status: COMPLETED | OUTPATIENT
Start: 2024-01-29 | End: 2024-01-29

## 2024-01-29 RX ADMIN — LACOSAMIDE 100 MILLIGRAM(S): 50 TABLET ORAL at 17:27

## 2024-01-29 RX ADMIN — SODIUM CHLORIDE 100 MILLILITER(S): 9 INJECTION INTRAMUSCULAR; INTRAVENOUS; SUBCUTANEOUS at 17:49

## 2024-01-29 RX ADMIN — SODIUM CHLORIDE 100 MILLILITER(S): 9 INJECTION INTRAMUSCULAR; INTRAVENOUS; SUBCUTANEOUS at 01:13

## 2024-01-29 RX ADMIN — Medication 100 MILLIGRAM(S): at 21:07

## 2024-01-29 RX ADMIN — Medication 650 MILLIGRAM(S): at 15:29

## 2024-01-29 RX ADMIN — Medication 700 MILLIGRAM(S): at 02:09

## 2024-01-29 RX ADMIN — CEFTRIAXONE 100 MILLIGRAM(S): 500 INJECTION, POWDER, FOR SOLUTION INTRAMUSCULAR; INTRAVENOUS at 15:23

## 2024-01-29 RX ADMIN — LACOSAMIDE 100 MILLIGRAM(S): 50 TABLET ORAL at 05:35

## 2024-01-29 RX ADMIN — Medication 280 MILLIGRAM(S): at 01:35

## 2024-01-29 RX ADMIN — ENOXAPARIN SODIUM 40 MILLIGRAM(S): 100 INJECTION SUBCUTANEOUS at 05:35

## 2024-01-29 RX ADMIN — Medication 325 MILLIGRAM(S): at 13:03

## 2024-01-29 NOTE — DISCHARGE NOTE NURSING/CASE MANAGEMENT/SOCIAL WORK - NSDCFUADDAPPT_GEN_ALL_CORE_FT
APPTS ARE READY TO BE MADE: [ X] YES    Best Family or Patient Contact (if needed):    Additional Information about above appointments (if needed):    1: Neurology  2:   3:     Other comments or requests:

## 2024-01-29 NOTE — DISCHARGE NOTE PROVIDER - HOSPITAL COURSE
HPI:  44 y/o female with PMH of Down syndrome, dementia, nonverbal, decreased mobility, epilepsy who presents for elective EMU admission.    First event was GTC in Oct 2022, patient was seen at [facility] at the time. EEG (10/21/22) No seizures recorded, but noted to have with L>R frontal spikes. CTH non con was normal. She was started on VPA while inpatient. Patient was transitioned to Keppra in outpatient setting by Dr. Sourav Cardenas.  Patient was taking Keppra 750mg BID, still having seizures per family- did not tolerate increase. Patient now follows with Dr. Baum, Vimpat 100mg BID was  added.     Per family, seizure frequency is increasing, however unclear if events are true seizures or behavioral/ agitation related. EMU admission was recommended for event characterization and medication adjustment.     Patient seen at  in 12/18/23 for increased seizure frequency, treated for UTI and kept on same dose ASMs.     Home ASM's   Morning-Keppra 750 mg, Vimpat 100 mg   Afternoon-Vimpat 100 mg   Evening-Keppra 750 mg, Trazodone 100 mg     Of note, had seizure in admitting, code blue was called, given IV 10 mg versed and brought to ED. In the ED, had another seizure, given IV 2 mg ativan and IV 1g keppra with resolution.  (22 Jan 2024 16:40)    Hospital Course:  First event was GTC in Oct 2022, patient was seen at [facility] at the time. EEG (10/21/22) No seizures recorded, but noted to have with L>R frontal spikes. CTH non con was normal. She was started on VPA while inpatient. Patient was transitioned to Keppra in outpatient setting by Dr. Sourav Cardenas.  Patient was taking Keppra 750mg BID, still having seizures per family- did not tolerate increase. Patient now follows with Dr. Baum, Vimpat 100mg BID was  added.   Per family, seizure frequency is increasing, however unclear if events are true seizures or behavioral/ agitation related. EMU admission was recommended for event characterization and medication adjustment.   Patient seen at  in 12/18/23 for increased seizure frequency, treated for UTI and kept on same dose ASMs      Since admission, patient with a Tmax 102.1 on 1/22/2024, fever curve trending better, Tmax 100.6 Fahrenheit past 24 hours.   Latest labs with no leukocytosis, BMP with renal function within normal limits, hepatic function within normal limits.  Urinalysis with evidence for pyuria.  Urine culture with E. coli.  CT abdomen/pelvis shows bladder wall thickening, some soft tissue stranding in the lower pole of the right kidney noted.  Blood cultures obtained on admission with E. coli, negative for resistance gene testing.  Patient currently on ceftriaxone  Important Medication Changes and Reason:  UTI  Active or Pending Issues Requiring Follow-up:    Advanced Directives:   [ ] Full code  [ ] DNR  [ ] Hospice    Discharge Diagnoses: UTI         HPI:  42 y/o female with PMH of Down syndrome, dementia, nonverbal, decreased mobility, epilepsy who presents for elective EMU admission.    First event was GTC in Oct 2022, patient was seen at [facility] at the time. EEG (10/21/22) No seizures recorded, but noted to have with L>R frontal spikes. CTH non con was normal. She was started on VPA while inpatient. Patient was transitioned to Keppra in outpatient setting by Dr. Sourav Cardenas.  Patient was taking Keppra 750mg BID, still having seizures per family- did not tolerate increase. Patient now follows with Dr. Baum, Vimpat 100mg BID was  added.     Per family, seizure frequency is increasing, however unclear if events are true seizures or behavioral/ agitation related. EMU admission was recommended for event characterization and medication adjustment.     Patient seen at  in 12/18/23 for increased seizure frequency, treated for UTI and kept on same dose ASMs.     Home ASM's   Morning-Keppra 750 mg, Vimpat 100 mg   Afternoon-Vimpat 100 mg   Evening-Keppra 750 mg, Trazodone 100 mg     Of note, had seizure in admitting, code blue was called, given IV 10 mg versed and brought to ED. In the ED, had another seizure, given IV 2 mg ativan and IV 1g keppra with resolution.  (22 Jan 2024 16:40)    Hospital Course:  First event was GTC in Oct 2022, patient was seen at [facility] at the time. EEG (10/21/22) No seizures recorded, but noted to have with L>R frontal spikes. CTH non con was normal. She was started on VPA while inpatient. Patient was transitioned to Keppra in outpatient setting by Dr. Sourav Cardenas.  Patient was taking Keppra 750mg BID, still having seizures per family- did not tolerate increase. Patient now follows with Dr. Baum, Vimpat 100mg BID was  added.   Per family, seizure frequency is increasing, however unclear if events are true seizures or behavioral/ agitation related. EMU admission was recommended for event characterization and medication adjustment.   Patient seen at  in 12/18/23 for increased seizure frequency, treated for UTI and kept on same dose ASMs      Since admission, patient with a Tmax 102.1 on 1/22/2024, fever curve trending better, Tmax 100.6 Fahrenheit past 24 hours.   Latest labs with no leukocytosis, BMP with renal function within normal limits, hepatic function within normal limits.  Urinalysis with evidence for pyuria.  Urine culture with E. coli.  CT abdomen/pelvis shows bladder wall thickening, some soft tissue stranding in the lower pole of the right kidney noted.  Blood cultures obtained on admission with E. coli, negative for resistance gene testing. Patient s/p IV ceftriaxone, to continue course on discharge with Augmentin until 2/2/24. Neuro followed during hospital stay, s/p EEG 1/23-1/26, no acute seizures noted. 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 Rec to start on Clobazam 5 mg BID, RX sent by neurology to [facility]. Medically cleared for discharge with outpatient follow up by Neurology. Discharge/dispo/med rec discussed with Dr. Kingsley who determined patient stable and medically cleared for discharge home with HHA and prompt outpatient follow up for continued management and care.     Important Medication Changes and Reason:  Dx UTI - Augmentin 875 mg BID until 2/2/24    Active or Pending Issues Requiring Follow-up:  F/U Neurology     Advanced Directives:   [ X] Full code  [ ] DNR  [ ] Hospice    Discharge Diagnoses:   UTI  Epilepsy          HPI:  44 y/o female with PMH of Down syndrome, dementia, nonverbal, decreased mobility, epilepsy who presents for elective EMU admission.    First event was GTC in Oct 2022, patient was seen at [facility] at the time. EEG (10/21/22) No seizures recorded, but noted to have with L>R frontal spikes. CTH non con was normal. She was started on VPA while inpatient. Patient was transitioned to Keppra in outpatient setting by Dr. Sourav Cardenas.  Patient was taking Keppra 750mg BID, still having seizures per family- did not tolerate increase. Patient now follows with Dr. Baum, Vimpat 100mg BID was  added.     Per family, seizure frequency is increasing, however unclear if events are true seizures or behavioral/ agitation related. EMU admission was recommended for event characterization and medication adjustment.     Patient seen at  in 12/18/23 for increased seizure frequency, treated for UTI and kept on same dose ASMs.     Home ASM's   Morning-Keppra 750 mg, Vimpat 100 mg   Afternoon-Vimpat 100 mg   Evening-Keppra 750 mg, Trazodone 100 mg     Of note, had seizure in admitting, code blue was called, given IV 10 mg versed and brought to ED. In the ED, had another seizure, given IV 2 mg ativan and IV 1g keppra with resolution.  (22 Jan 2024 16:40)    Hospital Course:  First event was GTC in Oct 2022, patient was seen at [facility] at the time. EEG (10/21/22) No seizures recorded, but noted to have with L>R frontal spikes. CTH non con was normal. She was started on VPA while inpatient. Patient was transitioned to Keppra in outpatient setting by Dr. Sourav Cardenas.  Patient was taking Keppra 750mg BID, still having seizures per family- did not tolerate increase. Patient now follows with Dr. Baum, Vimpat 100mg BID was  added.   Per family, seizure frequency is increasing, however unclear if events are true seizures or behavioral/ agitation related. EMU admission was recommended for event characterization and medication adjustment.   Patient seen at  in 12/18/23 for increased seizure frequency, treated for UTI and kept on same dose ASMs      Since admission, patient with a Tmax 102.1 on 1/22/2024, fever curve trending better, Tmax 100.6 Fahrenheit past 24 hours, fevers resolved.   Urinalysis with evidence for pyuria.  rine culture with E. coli. CT abdomen/pelvis shows bladder wall thickening, some soft tissue stranding in the lower pole of the right kidney noted.  Blood cultures obtained on admission with E. coli, negative for resistance gene testing, BCx cleared on 1/27. ID consulted. Patient s/p IV ceftriaxone, to continue course on discharge with Augmentin until 2/2/24. Neuro followed during hospital stay, s/p EEG 1/23-1/26, no acute seizures noted. 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 Neuro rec to start on Clobazam 5 mg BID, RX sent by neurology to [facility]. Medically cleared for discharge with outpatient follow up by Neurology. Discharge/dispo/med rec discussed with Dr. Kingsley who determined patient stable and medically cleared for discharge home with HHA and prompt outpatient follow up for continued management and care.     Important Medication Changes and Reason:  Dx UTI - Augmentin 875 mg BID until 2/2/24  Dx Myoclonus - Clobazam 5 mg BID     Active or Pending Issues Requiring Follow-up:  F/U Neurology     Advanced Directives:   [ X] Full code  [ ] DNR  [ ] Hospice    Discharge Diagnoses:   UTI  Myoclonus  E. Coli Bacteremia

## 2024-01-29 NOTE — PROVIDER CONTACT NOTE (OTHER) - ASSESSMENT
Pt non verbal as per baseline. Pt having upper and lower extremity myoclonic shaking. Responding to pain. Grimacing. Pt at baseline on 3L nasal cannula but requiring non rebreather due to desaturation sustaining in the 80s. Shivering and shaking not subsiding for about 20 minutes. Provider at bedside during entirety of episode. 09:25 1mg Ativan given IVP. 09:33 1mg Ativan given IVP. 09:39 500mg IVP Keppra. 09:44 500mg IVP Keppra. Pt. vss. Non rebreather removed and back on 3L NC at end of episode.
Upper extremity twitching
pt sleepier than this morning. falling asleep while eating. VS 91/54 HR 77 temp 99.6.

## 2024-01-29 NOTE — PROVIDER CONTACT NOTE (OTHER) - RECOMMENDATIONS
Notify provider
Notify provider. cancel discharge
Keep provider at bedside. Give PRN medications. Ensure patient is safe and free from harm.

## 2024-01-29 NOTE — DISCHARGE NOTE PROVIDER - CARE PROVIDER_API CALL
Laz Baum  Neurology  [address], [facility] [address]  [place] [address]  Phone: [phone]  Fax: [phone]  Follow Up Time: 2 weeks

## 2024-01-29 NOTE — PROVIDER CONTACT NOTE (OTHER) - BACKGROUND
Convulsions
Pt has hx of seizures, early onset dementia, down syndrome
Pt admitted for sepsis and UTI

## 2024-01-29 NOTE — PROGRESS NOTE ADULT - SUBJECTIVE AND OBJECTIVE BOX
DATE OF SERVICE: 01-29-24 @ 12:48    Patient is a 43y old  Female who presents with a chief complaint of EMU admission (28 Jan 2024 13:44)      SUBJECTIVE / OVERNIGHT EVENTS:  NAD    MEDICATIONS  (STANDING):  cefTRIAXone   IVPB 1000 milliGRAM(s) IV Intermittent every 24 hours  cefTRIAXone   IVPB      dextrose 5%. 1000 milliLiter(s) (100 mL/Hr) IV Continuous <Continuous>  dextrose 5%. 1000 milliLiter(s) (50 mL/Hr) IV Continuous <Continuous>  dextrose 50% Injectable 25 Gram(s) IV Push once  dextrose 50% Injectable 12.5 Gram(s) IV Push once  dextrose 50% Injectable 25 Gram(s) IV Push once  enoxaparin Injectable 40 milliGRAM(s) SubCutaneous every 24 hours  ferrous    sulfate 325 milliGRAM(s) Oral daily  glucagon  Injectable 1 milliGRAM(s) IntraMuscular once  influenza   Vaccine 0.5 milliLiter(s) IntraMuscular once  lacosamide Solution 100 milliGRAM(s) Oral two times a day  polyethylene glycol 3350 17 Gram(s) Oral two times a day  senna 2 Tablet(s) Oral at bedtime  traZODone 100 milliGRAM(s) Oral at bedtime    MEDICATIONS  (PRN):  acetaminophen   Oral Liquid .. 650 milliGRAM(s) Oral every 6 hours PRN Temp greater or equal to 38C (100.4F)  dextrose Oral Gel 15 Gram(s) Oral once PRN Blood Glucose LESS THAN 70 milliGRAM(s)/deciliter      Vital Signs Last 24 Hrs  T(C): 36.7 (29 Jan 2024 08:52), Max: 37.8 (28 Jan 2024 16:29)  T(F): 98 (29 Jan 2024 08:52), Max: 100.1 (28 Jan 2024 16:29)  HR: 78 (29 Jan 2024 08:52) (75 - 86)  BP: 98/82 (29 Jan 2024 08:52) (95/41 - 115/82)  BP(mean): --  RR: 20 (29 Jan 2024 08:52) (18 - 20)  SpO2: 94% (29 Jan 2024 08:52) (93% - 94%)    Parameters below as of 29 Jan 2024 05:56  Patient On (Oxygen Delivery Method): room air      CAPILLARY BLOOD GLUCOSE      POCT Blood Glucose.: 148 mg/dL (29 Jan 2024 07:48)    I&O's Summary    28 Jan 2024 07:01  -  29 Jan 2024 07:00  --------------------------------------------------------  IN: 0 mL / OUT: 1000 mL / NET: -1000 mL        PHYSICAL EXAM:  GENERAL: NAD, well-developed  HEAD:  Atraumatic, Normocephalic  EYES: EOMI, PERRLA, conjunctiva and sclera clear  NECK: Supple, No JVD  CHEST/LUNG: Clear to auscultation bilaterally; No wheeze  HEART: Regular rate and rhythm; No murmurs, rubs, or gallops  ABDOMEN: Soft, Nontender, Nondistended; Bowel sounds present  EXTREMITIES:  2+ Peripheral Pulses, No clubbing, cyanosis, or edema  NEUROLOGY: non-focal  SKIN: No rashes or lesions    LABS:                        13.4   8.64  )-----------( 345      ( 29 Jan 2024 06:14 )             38.9     01-29    139  |  103  |  20  ----------------------------<  133<H>  4.0   |  24  |  0.78    Ca    8.7      29 Jan 2024 06:14  Phos  3.4     01-29  Mg     2.2     01-29    TPro  7.2  /  Alb  3.5  /  TBili  0.4  /  DBili  x   /  AST  54<H>  /  ALT  61<H>  /  AlkPhos  89  01-29          Urinalysis Basic - ( 29 Jan 2024 06:14 )    Color: x / Appearance: x / SG: x / pH: x  Gluc: 133 mg/dL / Ketone: x  / Bili: x / Urobili: x   Blood: x / Protein: x / Nitrite: x   Leuk Esterase: x / RBC: x / WBC x   Sq Epi: x / Non Sq Epi: x / Bacteria: x        RADIOLOGY & ADDITIONAL TESTS:    Imaging Personally Reviewed:    Consultant(s) Notes Reviewed:      Care Discussed with Consultants/Other Providers:

## 2024-01-29 NOTE — PROVIDER CONTACT NOTE (OTHER) - ACTION/TREATMENT ORDERED:
No interventions. Continue to monitor
Provider made aware. As per NP, attending will contact patients mother. However patient is planned for discharge at 5pm.
2mg total Ativan IVP  1000mg total Keppra IVP  EEG report shows no seizure event  Will continue to monitor patient

## 2024-01-29 NOTE — DISCHARGE NOTE PROVIDER - NSDCFUADDAPPT_GEN_ALL_CORE_FT
APPTS ARE READY TO BE MADE: [ X] YES    Best Family or Patient Contact (if needed):    Additional Information about above appointments (if needed):    1: Neurology  2:   3:     Other comments or requests:    APPTS ARE READY TO BE MADE: [ X] YES    Best Family or Patient Contact (if needed):    Additional Information about above appointments (if needed):    1: Neurology  2:   3:     Other comments or requests:   Prior appt with Dr. Baum 3/19. Pt awaiting callback from office for sooner appt.

## 2024-01-29 NOTE — DISCHARGE NOTE PROVIDER - NSDCMRMEDTOKEN_GEN_ALL_CORE_FT
amoxicillin-clavulanate 875 mg-125 mg oral tablet: 875 milligram(s) orally 2 times a day  FERROUS SULFATE 325MG (5GR) TABS: 1 each orally once a day  lacosamide 100 mg oral tablet: 1 tab(s) orally 2 times a day  levETIRAcetam 750 mg oral tablet, dispersible: 1 tab(s) orally every 12 hours   MiraLax oral powder for reconstitution: 17 gram(s) orally once a day,  DO NOT take if having diarrhea  MULTIVITAMIN TABLETS: 1 each orally once a day  senna (sennosides) 8.6 mg oral tablet: 2 tab(s) orally once a day (at bedtime)   TRAZODONE 100MG TABLETS: 1 each orally once a day (at bedtime)   amoxicillin-clavulanate 875 mg-125 mg oral tablet: 875 milligram(s) orally 2 times a day  FERROUS SULFATE 325MG (5GR) TABS: 1 each orally once a day  lacosamide 100 mg oral tablet: 1 tab(s) orally 2 times a day  MiraLax oral powder for reconstitution: 17 gram(s) orally once a day,  DO NOT take if having diarrhea  MULTIVITAMIN TABLETS: 1 each orally once a day  senna (sennosides) 8.6 mg oral tablet: 2 tab(s) orally once a day (at bedtime)   TRAZODONE 100MG TABLETS: 1 each orally once a day (at bedtime)   amoxicillin-clavulanate 875 mg-125 mg oral tablet: 875 milligram(s) orally 2 times a day  cloBAZam 10 mg oral tablet: 0.5 tab(s) orally 2 times a day price check MDD: 1  FERROUS SULFATE 325MG (5GR) TABS: 1 each orally once a day  lacosamide 100 mg oral tablet: 1 tab(s) orally 2 times a day  MiraLax oral powder for reconstitution: 17 gram(s) orally once a day,  DO NOT take if having diarrhea  MULTIVITAMIN TABLETS: 1 each orally once a day  senna (sennosides) 8.6 mg oral tablet: 2 tab(s) orally once a day (at bedtime)   TRAZODONE 100MG TABLETS: 1 each orally once a day (at bedtime)

## 2024-01-29 NOTE — DISCHARGE NOTE PROVIDER - NSDCCPCAREPLAN_GEN_ALL_CORE_FT
PRINCIPAL DISCHARGE DIAGNOSIS  Diagnosis: Seizures  Assessment and Plan of Treatment: Myoclonic jerks with O2 desaturation with concern for seizures. EEG negative thus far for seizures, but patient at high risk for seizures. UA +  EEG removed- explained to mother at bedside no further indication for EEG as monitoring has been unremarkable. Explained that events of diffuse body tremors likely related to distress, possibly related to pain/symptoms from cystitis- nonepileptic in nature.   [x] Monitor off home Keppra   [x] c/w home vimpat 100mg bid      SECONDARY DISCHARGE DIAGNOSES  Diagnosis: UTI (urinary tract infection)  Assessment and Plan of Treatment: HOME CARE INSTRUCTIONS  f you were prescribed antibiotics, take them exactly as your caregiver instructs you. Finish the medication even if you feel better after you have only taken some of the medication.  Drink enough water and fluids to keep your urine clear or pale yellow.  Avoid caffeine, tea, and carbonated beverages. They tend to irritate your bladder.  Empty your bladder often. Avoid holding urine for long periods of time.  Empty your bladder before and after sexual intercourse.  After a bowel movement, women should cleanse from front to back. Use each tissue only once.  SEEK MEDICAL CARE IF:  You have back pain.  You develop a fever.  Your symptoms do not begin to resolve within 3 days.  SEEK IMMEDIATE MEDICAL CARE IF:  You have severe back pain or lower abdominal pain.  You develop chills.  You have nausea or vomiting.  You have continued burning or discomfort with urination.       PRINCIPAL DISCHARGE DIAGNOSIS  Diagnosis: Seizures  Assessment and Plan of Treatment: Myoclonic jerks with O2 desaturation with concern for seizures. EEG negative thus far for seizures, but patient at high risk for seizures. UA +  EEG removed- explained to mother at bedside no further indication for EEG as monitoring has been unremarkable. Explained that events of diffuse body tremors likely related to distress, possibly related to pain/symptoms from cystitis- nonepileptic in nature. It is recommended to monitor off home Keppra, continue home vimpat 100mg twice daily and Start Clobazam 5 mg twice daily.   Please promptly follow up outpatient with Neurology for continued management. If patient does not have a Neurologist, can follow at [address] [place] [address]. [address]. [place] [address].  Patient can call [phone] to schedule this appointment.      SECONDARY DISCHARGE DIAGNOSES  Diagnosis: UTI (urinary tract infection)  Assessment and Plan of Treatment: HOME CARE INSTRUCTIONS  Please take antibiotics as prescribed them exactly as your caregiver instructs you. Finish the medication even if you feel better after you have only taken some of the medication.  Drink enough water and fluids to keep your urine clear or pale yellow.  Avoid caffeine, tea, and carbonated beverages. They tend to irritate your bladder.  Empty your bladder often. Avoid holding urine for long periods of time.  Empty your bladder before and after sexual intercourse.  After a bowel movement, women should cleanse from front to back. Use each tissue only once.  SEEK MEDICAL CARE IF:  You have back pain.  You develop a fever.  Your symptoms do not begin to resolve within 3 days.  SEEK IMMEDIATE MEDICAL CARE IF:  You have severe back pain or lower abdominal pain.  You develop chills.  You have nausea or vomiting.  You have continued burning or discomfort with urination.       PRINCIPAL DISCHARGE DIAGNOSIS  Diagnosis: Myoclonus  Assessment and Plan of Treatment: Myoclonic jerks with O2 desaturation with concern for seizures. EEG negative thus far for seizures, but patient at high risk for seizures. UA + Additional EEG showing increased risk for seizures, left frontal predominance of bifrontal discharges. Decreased seizure threshold in setting of acute infection, now treated   EEG removed- Neurology explained to mother at bedside no further indication for EEG as monitoring has been unremarkable. Events of diffuse body tremors likely related to distress, possibly related to pain/symptoms from cystitis- nonepileptic in nature. It is recommended to monitor off home Keppra, continue home vimpat 100mg twice daily and Start Clobazam 5 mg twice daily.   Please promptly follow up outpatient with Neurology for continued management. If patient does not have a Neurologist, can follow at [address] [place] [address]. [address]. [place] [address].  Patient can call [phone] to schedule this appointment.      SECONDARY DISCHARGE DIAGNOSES  Diagnosis: UTI (urinary tract infection)  Assessment and Plan of Treatment: HOME CARE INSTRUCTIONS  Please take antibiotics as prescribed them exactly as your caregiver instructs you. Finish the medication even if you feel better after you have only taken some of the medication.  Drink enough water and fluids to keep your urine clear or pale yellow.  Avoid caffeine, tea, and carbonated beverages. They tend to irritate your bladder.  Empty your bladder often. Avoid holding urine for long periods of time.  Empty your bladder before and after sexual intercourse.  After a bowel movement, women should cleanse from front to back. Use each tissue only once.  SEEK MEDICAL CARE IF:  You have back pain.  You develop a fever.  Your symptoms do not begin to resolve within 3 days.  SEEK IMMEDIATE MEDICAL CARE IF:  You have severe back pain or lower abdominal pain.  You develop chills.  You have nausea or vomiting.  You have continued burning or discomfort with urination.      Diagnosis: Bacteremia, escherichia coli  Assessment and Plan of Treatment: You were found to have E. Coli Bacteremia secondary to UTI. You were followed by ID specialist and given IV antibiotics, it is recommended to continue course of PO antibiotics on discharge as prescribed. END DATE 2/2/24.

## 2024-01-29 NOTE — DISCHARGE NOTE NURSING/CASE MANAGEMENT/SOCIAL WORK - PATIENT PORTAL LINK FT
You can access the FollowMyHealth Patient Portal offered by [facility] by registering at the following website: [email or web address]. By joining [email or web address] [email or web address] portal, you will also be able to view your health information using other applications (apps) compatible with our system.

## 2024-01-29 NOTE — PROVIDER CONTACT NOTE (OTHER) - REASON
Upper extremity twitching
Pt having possible seizure event for about 20 min
pt sleepier than this morning, falling asleep while eating; BP 91/54, HR 77. rectal temp 99.6

## 2024-01-29 NOTE — DISCHARGE NOTE PROVIDER - NSDCFUSCHEDAPPT_GEN_ALL_CORE_FT
[facility]  NEUROLOGY [address] [place] [address]  Scheduled Appointment: 03/19/2024

## 2024-01-29 NOTE — PROVIDER CONTACT NOTE (OTHER) - SITUATION
pt sleepier than this morning, falling asleep while eating; BP 91/54, HR 77. rectal temp 99.6
Pt having possible seizure event
Upper extremity twitching

## 2024-01-29 NOTE — PROGRESS NOTE ADULT - ASSESSMENT
42 y/o female with PMH of Down syndrome, dementia, nonverbal, decreased mobility, epilepsy who presents for elective EMU admission.    Epilepsy  - no acute seizures per neurology  - cont vimpat    myovlonic jerks  - neurology followup  - observe    fever metabolic encephalopathy  - UTI  -  sepsis  - Ecoli Bacteremia and cystitis  - c/w ceftriaxone  - follow up cultures  - ID is following  - plan to treat for 10 days with transition to PO amoxicillin/clavulanate 875 BID at discharge, end date: 2/2/24    dysphagia  - swallow eval  -  puree diet    constipation  - senna and miralax    anemia  - iron studies c/w ACD  - c/w iron    dvt px    d/w mother  discharge home today

## 2024-01-29 NOTE — DISCHARGE NOTE NURSING/CASE MANAGEMENT/SOCIAL WORK - NSDCPEFALRISK_GEN_ALL_CORE
For information on Fall & Injury Prevention, visit: [email or web address] OR  [email or web address] OR  https://www.cdc.gov/steadi/patient.html

## 2024-01-30 LAB
ALBUMIN SERPL ELPH-MCNC: 3.2 G/DL — LOW (ref 3.3–5)
ALP SERPL-CCNC: 80 U/L — SIGNIFICANT CHANGE UP (ref 40–120)
ALT FLD-CCNC: 75 U/L — HIGH (ref 10–45)
ANION GAP SERPL CALC-SCNC: 12 MMOL/L — SIGNIFICANT CHANGE UP (ref 5–17)
AST SERPL-CCNC: 60 U/L — HIGH (ref 10–40)
BILIRUB SERPL-MCNC: 0.3 MG/DL — SIGNIFICANT CHANGE UP (ref 0.2–1.2)
BUN SERPL-MCNC: 19 MG/DL — SIGNIFICANT CHANGE UP (ref 7–23)
CALCIUM SERPL-MCNC: 8.8 MG/DL — SIGNIFICANT CHANGE UP (ref 8.4–10.5)
CHLORIDE SERPL-SCNC: 107 MMOL/L — SIGNIFICANT CHANGE UP (ref 96–108)
CO2 SERPL-SCNC: 25 MMOL/L — SIGNIFICANT CHANGE UP (ref 22–31)
CREAT SERPL-MCNC: 0.83 MG/DL — SIGNIFICANT CHANGE UP (ref 0.5–1.3)
EGFR: 90 ML/MIN/1.73M2 — SIGNIFICANT CHANGE UP
GLUCOSE SERPL-MCNC: 105 MG/DL — HIGH (ref 70–99)
HCT VFR BLD CALC: 38.2 % — SIGNIFICANT CHANGE UP (ref 34.5–45)
HGB BLD-MCNC: 12.7 G/DL — SIGNIFICANT CHANGE UP (ref 11.5–15.5)
MCHC RBC-ENTMCNC: 33.2 GM/DL — SIGNIFICANT CHANGE UP (ref 32–36)
MCHC RBC-ENTMCNC: 33.9 PG — SIGNIFICANT CHANGE UP (ref 27–34)
MCV RBC AUTO: 101.9 FL — HIGH (ref 80–100)
NRBC # BLD: 0 /100 WBCS — SIGNIFICANT CHANGE UP (ref 0–0)
PLATELET # BLD AUTO: 352 K/UL — SIGNIFICANT CHANGE UP (ref 150–400)
POTASSIUM SERPL-MCNC: 4.2 MMOL/L — SIGNIFICANT CHANGE UP (ref 3.5–5.3)
POTASSIUM SERPL-SCNC: 4.2 MMOL/L — SIGNIFICANT CHANGE UP (ref 3.5–5.3)
PROT SERPL-MCNC: 6.8 G/DL — SIGNIFICANT CHANGE UP (ref 6–8.3)
RBC # BLD: 3.75 M/UL — LOW (ref 3.8–5.2)
RBC # FLD: 12.6 % — SIGNIFICANT CHANGE UP (ref 10.3–14.5)
SODIUM SERPL-SCNC: 144 MMOL/L — SIGNIFICANT CHANGE UP (ref 135–145)
WBC # BLD: 5.42 K/UL — SIGNIFICANT CHANGE UP (ref 3.8–10.5)
WBC # FLD AUTO: 5.42 K/UL — SIGNIFICANT CHANGE UP (ref 3.8–10.5)

## 2024-01-30 PROCEDURE — 95720 EEG PHY/QHP EA INCR W/VEEG: CPT

## 2024-01-30 PROCEDURE — 76705 ECHO EXAM OF ABDOMEN: CPT | Mod: 26

## 2024-01-30 PROCEDURE — 99232 SBSQ HOSP IP/OBS MODERATE 35: CPT

## 2024-01-30 RX ADMIN — Medication 100 MILLIGRAM(S): at 22:17

## 2024-01-30 RX ADMIN — LACOSAMIDE 100 MILLIGRAM(S): 50 TABLET ORAL at 05:34

## 2024-01-30 RX ADMIN — Medication 325 MILLIGRAM(S): at 13:59

## 2024-01-30 RX ADMIN — POLYETHYLENE GLYCOL 3350 17 GRAM(S): 17 POWDER, FOR SOLUTION ORAL at 17:08

## 2024-01-30 RX ADMIN — LACOSAMIDE 100 MILLIGRAM(S): 50 TABLET ORAL at 17:08

## 2024-01-30 RX ADMIN — CEFTRIAXONE 100 MILLIGRAM(S): 500 INJECTION, POWDER, FOR SOLUTION INTRAMUSCULAR; INTRAVENOUS at 14:08

## 2024-01-30 RX ADMIN — ENOXAPARIN SODIUM 40 MILLIGRAM(S): 100 INJECTION SUBCUTANEOUS at 05:34

## 2024-01-30 RX ADMIN — POLYETHYLENE GLYCOL 3350 17 GRAM(S): 17 POWDER, FOR SOLUTION ORAL at 05:35

## 2024-01-30 NOTE — PROGRESS NOTE ADULT - SUBJECTIVE AND OBJECTIVE BOX
DATE OF SERVICE: 01-30-24 @ 11:05    Patient is a 43y old  Female who presents with a chief complaint of EMU admission (29 Jan 2024 13:02)      SUBJECTIVE / OVERNIGHT EVENTS:  mother concerned about pt still having twiches    MEDICATIONS  (STANDING):  cefTRIAXone   IVPB 1000 milliGRAM(s) IV Intermittent every 24 hours  cefTRIAXone   IVPB      dextrose 5%. 1000 milliLiter(s) (100 mL/Hr) IV Continuous <Continuous>  dextrose 5%. 1000 milliLiter(s) (50 mL/Hr) IV Continuous <Continuous>  dextrose 50% Injectable 25 Gram(s) IV Push once  dextrose 50% Injectable 25 Gram(s) IV Push once  dextrose 50% Injectable 12.5 Gram(s) IV Push once  enoxaparin Injectable 40 milliGRAM(s) SubCutaneous every 24 hours  ferrous    sulfate 325 milliGRAM(s) Oral daily  glucagon  Injectable 1 milliGRAM(s) IntraMuscular once  influenza   Vaccine 0.5 milliLiter(s) IntraMuscular once  lacosamide Solution 100 milliGRAM(s) Oral two times a day  polyethylene glycol 3350 17 Gram(s) Oral two times a day  senna 2 Tablet(s) Oral at bedtime  traZODone 100 milliGRAM(s) Oral at bedtime    MEDICATIONS  (PRN):  acetaminophen   Oral Liquid .. 650 milliGRAM(s) Oral every 6 hours PRN Temp greater or equal to 38C (100.4F)  dextrose Oral Gel 15 Gram(s) Oral once PRN Blood Glucose LESS THAN 70 milliGRAM(s)/deciliter      Vital Signs Last 24 Hrs  T(C): 36.7 (30 Jan 2024 08:51), Max: 37.6 (29 Jan 2024 15:55)  T(F): 98.1 (30 Jan 2024 08:51), Max: 99.7 (29 Jan 2024 23:38)  HR: 71 (30 Jan 2024 08:51) (71 - 85)  BP: 98/61 (30 Jan 2024 08:51) (91/54 - 114/75)  BP(mean): --  RR: 81 (30 Jan 2024 08:51) (18 - 81)  SpO2: 94% (30 Jan 2024 08:51) (93% - 97%)    Parameters below as of 30 Jan 2024 08:51  Patient On (Oxygen Delivery Method): room air      CAPILLARY BLOOD GLUCOSE        I&O's Summary    29 Jan 2024 07:01  -  30 Jan 2024 07:00  --------------------------------------------------------  IN: 0 mL / OUT: 1250 mL / NET: -1250 mL        PHYSICAL EXAM:  GENERAL: NAD, well-developed  HEAD:  Atraumatic, Normocephalic  EYES: EOMI, PERRLA, conjunctiva and sclera clear  NECK: Supple, No JVD  CHEST/LUNG: Clear to auscultation bilaterally; No wheeze  HEART: Regular rate and rhythm; No murmurs, rubs, or gallops  ABDOMEN: Soft, Nontender, Nondistended; Bowel sounds present  EXTREMITIES:  2+ Peripheral Pulses, No clubbing, cyanosis, or edema  NEUROLOGY: non-focal  SKIN: No rashes or lesions    LABS:                        12.7   5.42  )-----------( 352      ( 30 Jan 2024 06:24 )             38.2     01-30    144  |  107  |  19  ----------------------------<  105<H>  4.2   |  25  |  0.83    Ca    8.8      30 Jan 2024 06:26  Phos  3.4     01-29  Mg     2.2     01-29    TPro  6.8  /  Alb  3.2<L>  /  TBili  0.3  /  DBili  x   /  AST  60<H>  /  ALT  75<H>  /  AlkPhos  80  01-30          Urinalysis Basic - ( 30 Jan 2024 06:26 )    Color: x / Appearance: x / SG: x / pH: x  Gluc: 105 mg/dL / Ketone: x  / Bili: x / Urobili: x   Blood: x / Protein: x / Nitrite: x   Leuk Esterase: x / RBC: x / WBC x   Sq Epi: x / Non Sq Epi: x / Bacteria: x        RADIOLOGY & ADDITIONAL TESTS:    Imaging Personally Reviewed:    Consultant(s) Notes Reviewed:      Care Discussed with Consultants/Other Providers:

## 2024-01-30 NOTE — PROGRESS NOTE ADULT - ASSESSMENT
42 y/o female with PMH of Down syndrome, dementia, nonverbal, decreased mobility, epilepsy who presents for elective EMU admission.    Epilepsy  - no acute seizures per neurology  - cont vimpat    myovlonic jerks  - spoke with neurology   - EEG    fever metabolic encephalopathy  - UTI  -  sepsis  - Ecoli Bacteremia and cystitis  - c/w ceftriaxone  - follow up cultures  - ID is following  - plan to treat for 10 days with transition to PO amoxicillin/clavulanate 875 BID at discharge, end date: 2/2/24    dysphagia  - s/p swallow eval  -  puree diet    constipation  - senna and miralax    anemia  - iron studies c/w ACD  - c/w iron    dvt px    d/w mother

## 2024-01-31 VITALS
RESPIRATION RATE: 18 BRPM | SYSTOLIC BLOOD PRESSURE: 100 MMHG | TEMPERATURE: 98 F | HEART RATE: 65 BPM | DIASTOLIC BLOOD PRESSURE: 61 MMHG | OXYGEN SATURATION: 96 %

## 2024-01-31 LAB
ALBUMIN SERPL ELPH-MCNC: 3.6 G/DL — SIGNIFICANT CHANGE UP (ref 3.3–5)
ALP SERPL-CCNC: 84 U/L — SIGNIFICANT CHANGE UP (ref 40–120)
ALT FLD-CCNC: 91 U/L — HIGH (ref 10–45)
ANION GAP SERPL CALC-SCNC: 11 MMOL/L — SIGNIFICANT CHANGE UP (ref 5–17)
AST SERPL-CCNC: 61 U/L — HIGH (ref 10–40)
BILIRUB SERPL-MCNC: 0.3 MG/DL — SIGNIFICANT CHANGE UP (ref 0.2–1.2)
BUN SERPL-MCNC: 20 MG/DL — SIGNIFICANT CHANGE UP (ref 7–23)
CALCIUM SERPL-MCNC: 8.7 MG/DL — SIGNIFICANT CHANGE UP (ref 8.4–10.5)
CHLORIDE SERPL-SCNC: 102 MMOL/L — SIGNIFICANT CHANGE UP (ref 96–108)
CO2 SERPL-SCNC: 28 MMOL/L — SIGNIFICANT CHANGE UP (ref 22–31)
CREAT SERPL-MCNC: 0.82 MG/DL — SIGNIFICANT CHANGE UP (ref 0.5–1.3)
EGFR: 91 ML/MIN/1.73M2 — SIGNIFICANT CHANGE UP
GLUCOSE SERPL-MCNC: 114 MG/DL — HIGH (ref 70–99)
POTASSIUM SERPL-MCNC: 4 MMOL/L — SIGNIFICANT CHANGE UP (ref 3.5–5.3)
POTASSIUM SERPL-SCNC: 4 MMOL/L — SIGNIFICANT CHANGE UP (ref 3.5–5.3)
PROT SERPL-MCNC: 7.1 G/DL — SIGNIFICANT CHANGE UP (ref 6–8.3)
SODIUM SERPL-SCNC: 141 MMOL/L — SIGNIFICANT CHANGE UP (ref 135–145)

## 2024-01-31 PROCEDURE — 83540 ASSAY OF IRON: CPT

## 2024-01-31 PROCEDURE — 85730 THROMBOPLASTIN TIME PARTIAL: CPT

## 2024-01-31 PROCEDURE — 83036 HEMOGLOBIN GLYCOSYLATED A1C: CPT

## 2024-01-31 PROCEDURE — 76705 ECHO EXAM OF ABDOMEN: CPT

## 2024-01-31 PROCEDURE — 95700 EEG CONT REC W/VID EEG TECH: CPT

## 2024-01-31 PROCEDURE — 82962 GLUCOSE BLOOD TEST: CPT

## 2024-01-31 PROCEDURE — 84443 ASSAY THYROID STIM HORMONE: CPT

## 2024-01-31 PROCEDURE — 96372 THER/PROPH/DIAG INJ SC/IM: CPT | Mod: XU

## 2024-01-31 PROCEDURE — 87040 BLOOD CULTURE FOR BACTERIA: CPT

## 2024-01-31 PROCEDURE — 83550 IRON BINDING TEST: CPT

## 2024-01-31 PROCEDURE — 96374 THER/PROPH/DIAG INJ IV PUSH: CPT

## 2024-01-31 PROCEDURE — 82947 ASSAY GLUCOSE BLOOD QUANT: CPT

## 2024-01-31 PROCEDURE — 84300 ASSAY OF URINE SODIUM: CPT

## 2024-01-31 PROCEDURE — 83735 ASSAY OF MAGNESIUM: CPT

## 2024-01-31 PROCEDURE — 95716 VEEG EA 12-26HR CONT MNTR: CPT

## 2024-01-31 PROCEDURE — 82607 VITAMIN B-12: CPT

## 2024-01-31 PROCEDURE — 83605 ASSAY OF LACTIC ACID: CPT

## 2024-01-31 PROCEDURE — 85014 HEMATOCRIT: CPT

## 2024-01-31 PROCEDURE — 82140 ASSAY OF AMMONIA: CPT

## 2024-01-31 PROCEDURE — 71045 X-RAY EXAM CHEST 1 VIEW: CPT

## 2024-01-31 PROCEDURE — 85018 HEMOGLOBIN: CPT

## 2024-01-31 PROCEDURE — C9254: CPT

## 2024-01-31 PROCEDURE — 92526 ORAL FUNCTION THERAPY: CPT

## 2024-01-31 PROCEDURE — 87186 SC STD MICRODIL/AGAR DIL: CPT

## 2024-01-31 PROCEDURE — 87635 SARS-COV-2 COVID-19 AMP PRB: CPT

## 2024-01-31 PROCEDURE — 36415 COLL VENOUS BLD VENIPUNCTURE: CPT

## 2024-01-31 PROCEDURE — 84132 ASSAY OF SERUM POTASSIUM: CPT

## 2024-01-31 PROCEDURE — 85610 PROTHROMBIN TIME: CPT

## 2024-01-31 PROCEDURE — 80048 BASIC METABOLIC PNL TOTAL CA: CPT

## 2024-01-31 PROCEDURE — 85027 COMPLETE CBC AUTOMATED: CPT

## 2024-01-31 PROCEDURE — 81001 URINALYSIS AUTO W/SCOPE: CPT

## 2024-01-31 PROCEDURE — 80235 DRUG ASSAY LACOSAMIDE: CPT

## 2024-01-31 PROCEDURE — 87150 DNA/RNA AMPLIFIED PROBE: CPT

## 2024-01-31 PROCEDURE — 82330 ASSAY OF CALCIUM: CPT

## 2024-01-31 PROCEDURE — 92610 EVALUATE SWALLOWING FUNCTION: CPT

## 2024-01-31 PROCEDURE — 85025 COMPLETE CBC W/AUTO DIFF WBC: CPT

## 2024-01-31 PROCEDURE — 82728 ASSAY OF FERRITIN: CPT

## 2024-01-31 PROCEDURE — 74176 CT ABD & PELVIS W/O CONTRAST: CPT

## 2024-01-31 PROCEDURE — 80177 DRUG SCRN QUAN LEVETIRACETAM: CPT

## 2024-01-31 PROCEDURE — 87086 URINE CULTURE/COLONY COUNT: CPT

## 2024-01-31 PROCEDURE — 82435 ASSAY OF BLOOD CHLORIDE: CPT

## 2024-01-31 PROCEDURE — 82746 ASSAY OF FOLIC ACID SERUM: CPT

## 2024-01-31 PROCEDURE — 99285 EMERGENCY DEPT VISIT HI MDM: CPT

## 2024-01-31 PROCEDURE — 80053 COMPREHEN METABOLIC PANEL: CPT

## 2024-01-31 PROCEDURE — 87077 CULTURE AEROBIC IDENTIFY: CPT

## 2024-01-31 PROCEDURE — 84100 ASSAY OF PHOSPHORUS: CPT

## 2024-01-31 PROCEDURE — 96375 TX/PRO/DX INJ NEW DRUG ADDON: CPT

## 2024-01-31 PROCEDURE — 84295 ASSAY OF SERUM SODIUM: CPT

## 2024-01-31 PROCEDURE — 82803 BLOOD GASES ANY COMBINATION: CPT

## 2024-01-31 RX ORDER — CLOBAZAM 10 MG/1
0.5 TABLET ORAL
Qty: 30 | Refills: 0
Start: 2024-01-31 | End: 2024-02-29

## 2024-01-31 RX ORDER — CLOBAZAM 10 MG/1
1 TABLET ORAL
Qty: 60 | Refills: 0
Start: 2024-01-31 | End: 2024-02-29

## 2024-01-31 RX ADMIN — ENOXAPARIN SODIUM 40 MILLIGRAM(S): 100 INJECTION SUBCUTANEOUS at 05:07

## 2024-01-31 RX ADMIN — Medication 650 MILLIGRAM(S): at 13:37

## 2024-01-31 RX ADMIN — LACOSAMIDE 100 MILLIGRAM(S): 50 TABLET ORAL at 05:07

## 2024-01-31 RX ADMIN — Medication 325 MILLIGRAM(S): at 13:38

## 2024-01-31 RX ADMIN — CEFTRIAXONE 100 MILLIGRAM(S): 500 INJECTION, POWDER, FOR SOLUTION INTRAMUSCULAR; INTRAVENOUS at 13:34

## 2024-01-31 RX ADMIN — LACOSAMIDE 100 MILLIGRAM(S): 50 TABLET ORAL at 17:30

## 2024-01-31 RX ADMIN — Medication 650 MILLIGRAM(S): at 14:15

## 2024-01-31 NOTE — PROGRESS NOTE ADULT - SUBJECTIVE AND OBJECTIVE BOX
SUBJECTIVE/INTERVAL HISTORY:    PAST MEDICAL & SURGICAL HISTORY:  Down syndrome      Cataract      Epilepsy      H/O tubal ligation        FAMILY HISTORY:      MEDICATIONS (HOME):  Home Medications:  FERROUS SULFATE 325MG (5GR) TABS: 1 each orally once a day (30 Mar 2023 14:13)  lacosamide 100 mg oral tablet: 1 tab(s) orally 2 times a day (22 Jan 2024 17:16)  MULTIVITAMIN TABLETS: 1 each orally once a day (30 Mar 2023 14:13)  TRAZODONE 100MG TABLETS: 1 each orally once a day (at bedtime) (30 Mar 2023 14:13)    MEDICATIONS  (STANDING):  cefTRIAXone   IVPB 1000 milliGRAM(s) IV Intermittent every 24 hours  cefTRIAXone   IVPB      dextrose 5%. 1000 milliLiter(s) (100 mL/Hr) IV Continuous <Continuous>  dextrose 5%. 1000 milliLiter(s) (50 mL/Hr) IV Continuous <Continuous>  dextrose 50% Injectable 25 Gram(s) IV Push once  dextrose 50% Injectable 25 Gram(s) IV Push once  dextrose 50% Injectable 12.5 Gram(s) IV Push once  enoxaparin Injectable 40 milliGRAM(s) SubCutaneous every 24 hours  ferrous    sulfate 325 milliGRAM(s) Oral daily  glucagon  Injectable 1 milliGRAM(s) IntraMuscular once  influenza   Vaccine 0.5 milliLiter(s) IntraMuscular once  lacosamide Solution 100 milliGRAM(s) Oral two times a day  polyethylene glycol 3350 17 Gram(s) Oral two times a day  senna 2 Tablet(s) Oral at bedtime  traZODone 100 milliGRAM(s) Oral at bedtime    MEDICATIONS  (PRN):  acetaminophen   Oral Liquid .. 650 milliGRAM(s) Oral every 6 hours PRN Temp greater or equal to 38C (100.4F)  dextrose Oral Gel 15 Gram(s) Oral once PRN Blood Glucose LESS THAN 70 milliGRAM(s)/deciliter    ALLERGIES/INTOLERANCES:  Allergies  No Known Allergies    Intolerances    VITALS & EXAMINATION:  Vital Signs Last 24 Hrs  T(C): 36.6 (31 Jan 2024 08:31), Max: 36.7 (31 Jan 2024 05:03)  T(F): 97.9 (31 Jan 2024 08:31), Max: 98.1 (31 Jan 2024 05:03)  HR: 57 (31 Jan 2024 08:31) (57 - 87)  BP: 108/71 (31 Jan 2024 08:31) (98/62 - 116/68)  BP(mean): --  RR: 18 (31 Jan 2024 08:31) (17 - 18)  SpO2: 96% (31 Jan 2024 08:31) (94% - 96%)    Parameters below as of 31 Jan 2024 05:03  Patient On (Oxygen Delivery Method): room air        Mental Status: eyes open, alert, tracks examiner, no verbal output, does not follow simple or complex commands   Cranial Nerves: PERR, EOMI, blink to threat intact b/l. No facial droop. Tongue midline. Oculocephalic intact, corneal intact  Motor: Tone: increased. Strength: limited exam. 2/5 in UE and 1/5 in LE    Dysmetria: did not participate   Sensation: withdraws x 4 to all extremities    LABORATORY:  CBC                       12.7   5.42  )-----------( 352      ( 30 Jan 2024 06:24 )             38.2     Chem 01-31    141  |  102  |  20  ----------------------------<  114<H>  4.0   |  28  |  0.82    Ca    8.7      31 Jan 2024 06:27    TPro  7.1  /  Alb  3.6  /  TBili  0.3  /  DBili  x   /  AST  61<H>  /  ALT  91<H>  /  AlkPhos  84  01-31    LFTs LIVER FUNCTIONS - ( 31 Jan 2024 06:27 )  Alb: 3.6 g/dL / Pro: 7.1 g/dL / ALK PHOS: 84 U/L / ALT: 91 U/L / AST: 61 U/L / GGT: x           Coagulopathy   Lipid Panel   A1c   Cardiac enzymes     U/A Urinalysis Basic - ( 31 Jan 2024 06:27 )    Color: x / Appearance: x / SG: x / pH: x  Gluc: 114 mg/dL / Ketone: x  / Bili: x / Urobili: x   Blood: x / Protein: x / Nitrite: x   Leuk Esterase: x / RBC: x / WBC x   Sq Epi: x / Non Sq Epi: x / Bacteria: x        STUDIES & IMAGING:  Studies (EKG, EEG, EMG, etc):     Radiology (XR, CT, MR, U/S, TTE/MADYSON): SUBJECTIVE/INTERVAL HISTORY:    PAST MEDICAL & SURGICAL HISTORY:  Down syndrome  Cataract  Epilepsy  H/O tubal ligation    MEDICATIONS (HOME):  Home Medications:  FERROUS SULFATE 325MG (5GR) TABS: 1 each orally once a day (30 Mar 2023 14:13)  lacosamide 100 mg oral tablet: 1 tab(s) orally 2 times a day (22 Jan 2024 17:16)  MULTIVITAMIN TABLETS: 1 each orally once a day (30 Mar 2023 14:13)  TRAZODONE 100MG TABLETS: 1 each orally once a day (at bedtime) (30 Mar 2023 14:13)    MEDICATIONS  (STANDING):  cefTRIAXone   IVPB 1000 milliGRAM(s) IV Intermittent every 24 hours  cefTRIAXone   IVPB      dextrose 5%. 1000 milliLiter(s) (100 mL/Hr) IV Continuous <Continuous>  dextrose 5%. 1000 milliLiter(s) (50 mL/Hr) IV Continuous <Continuous>  dextrose 50% Injectable 25 Gram(s) IV Push once  dextrose 50% Injectable 25 Gram(s) IV Push once  dextrose 50% Injectable 12.5 Gram(s) IV Push once  enoxaparin Injectable 40 milliGRAM(s) SubCutaneous every 24 hours  ferrous    sulfate 325 milliGRAM(s) Oral daily  glucagon  Injectable 1 milliGRAM(s) IntraMuscular once  influenza   Vaccine 0.5 milliLiter(s) IntraMuscular once  lacosamide Solution 100 milliGRAM(s) Oral two times a day  polyethylene glycol 3350 17 Gram(s) Oral two times a day  senna 2 Tablet(s) Oral at bedtime  traZODone 100 milliGRAM(s) Oral at bedtime    MEDICATIONS  (PRN):  acetaminophen   Oral Liquid .. 650 milliGRAM(s) Oral every 6 hours PRN Temp greater or equal to 38C (100.4F)  dextrose Oral Gel 15 Gram(s) Oral once PRN Blood Glucose LESS THAN 70 milliGRAM(s)/deciliter    ALLERGIES/INTOLERANCES:  Allergies  No Known Allergies    Intolerances    VITALS & EXAMINATION:  Vital Signs Last 24 Hrs  T(C): 36.6 (31 Jan 2024 08:31), Max: 36.7 (31 Jan 2024 05:03)  T(F): 97.9 (31 Jan 2024 08:31), Max: 98.1 (31 Jan 2024 05:03)  HR: 57 (31 Jan 2024 08:31) (57 - 87)  BP: 108/71 (31 Jan 2024 08:31) (98/62 - 116/68)  BP(mean): --  RR: 18 (31 Jan 2024 08:31) (17 - 18)  SpO2: 96% (31 Jan 2024 08:31) (94% - 96%)    Parameters below as of 31 Jan 2024 05:03  Patient On (Oxygen Delivery Method): room air        Mental Status: eyes open, alert, tracks examiner, no verbal output, does not follow simple or complex commands   Cranial Nerves: PERR, EOMI, blink to threat intact b/l. No facial droop. Tongue midline. Oculocephalic intact, corneal intact  Motor: Tone: increased. Strength: limited exam. 2/5 in UE and 1/5 in LE    Dysmetria: did not participate   Sensation: withdraws x 4 to all extremities    LABORATORY:  CBC                       12.7   5.42  )-----------( 352      ( 30 Jan 2024 06:24 )             38.2     Chem 01-31    141  |  102  |  20  ----------------------------<  114<H>  4.0   |  28  |  0.82    Ca    8.7      31 Jan 2024 06:27    TPro  7.1  /  Alb  3.6  /  TBili  0.3  /  DBili  x   /  AST  61<H>  /  ALT  91<H>  /  AlkPhos  84  01-31    LFTs LIVER FUNCTIONS - ( 31 Jan 2024 06:27 )  Alb: 3.6 g/dL / Pro: 7.1 g/dL / ALK PHOS: 84 U/L / ALT: 91 U/L / AST: 61 U/L / GGT: x           Coagulopathy   Lipid Panel   A1c   Cardiac enzymes     U/A Urinalysis Basic - ( 31 Jan 2024 06:27 )    Color: x / Appearance: x / SG: x / pH: x  Gluc: 114 mg/dL / Ketone: x  / Bili: x / Urobili: x   Blood: x / Protein: x / Nitrite: x   Leuk Esterase: x / RBC: x / WBC x   Sq Epi: x / Non Sq Epi: x / Bacteria: x        STUDIES & IMAGING:  Studies (EKG, EEG, EMG, etc):     Radiology (XR, CT, MR, U/S, TTE/MADYSON):   SUBJECTIVE/INTERVAL HISTORY:   - Patient seen at bedside with Epilepsy attending Dr. Moses.  - Plan relayed to mother at bedside using [place] phone . ID [id number]  - Mother reports patient is improving but still appears sleepy and with poor appetite     PAST MEDICAL & SURGICAL HISTORY:  Down syndrome  Cataract  Epilepsy  H/O tubal ligation    MEDICATIONS (HOME):  Home Medications:  FERROUS SULFATE 325MG (5GR) TABS: 1 each orally once a day (30 Mar 2023 14:13)  lacosamide 100 mg oral tablet: 1 tab(s) orally 2 times a day (22 Jan 2024 17:16)  MULTIVITAMIN TABLETS: 1 each orally once a day (30 Mar 2023 14:13)  TRAZODONE 100MG TABLETS: 1 each orally once a day (at bedtime) (30 Mar 2023 14:13)    MEDICATIONS  (STANDING):  cefTRIAXone   IVPB 1000 milliGRAM(s) IV Intermittent every 24 hours  cefTRIAXone   IVPB      dextrose 5%. 1000 milliLiter(s) (100 mL/Hr) IV Continuous <Continuous>  dextrose 5%. 1000 milliLiter(s) (50 mL/Hr) IV Continuous <Continuous>  dextrose 50% Injectable 25 Gram(s) IV Push once  dextrose 50% Injectable 25 Gram(s) IV Push once  dextrose 50% Injectable 12.5 Gram(s) IV Push once  enoxaparin Injectable 40 milliGRAM(s) SubCutaneous every 24 hours  ferrous    sulfate 325 milliGRAM(s) Oral daily  glucagon  Injectable 1 milliGRAM(s) IntraMuscular once  influenza   Vaccine 0.5 milliLiter(s) IntraMuscular once  lacosamide Solution 100 milliGRAM(s) Oral two times a day  polyethylene glycol 3350 17 Gram(s) Oral two times a day  senna 2 Tablet(s) Oral at bedtime  traZODone 100 milliGRAM(s) Oral at bedtime    MEDICATIONS  (PRN):  acetaminophen   Oral Liquid .. 650 milliGRAM(s) Oral every 6 hours PRN Temp greater or equal to 38C (100.4F)  dextrose Oral Gel 15 Gram(s) Oral once PRN Blood Glucose LESS THAN 70 milliGRAM(s)/deciliter    ALLERGIES/INTOLERANCES:  Allergies  No Known Allergies    Intolerances    VITALS & EXAMINATION:  Vital Signs Last 24 Hrs  T(C): 36.6 (31 Jan 2024 08:31), Max: 36.7 (31 Jan 2024 05:03)  T(F): 97.9 (31 Jan 2024 08:31), Max: 98.1 (31 Jan 2024 05:03)  HR: 57 (31 Jan 2024 08:31) (57 - 87)  BP: 108/71 (31 Jan 2024 08:31) (98/62 - 116/68)  BP(mean): --  RR: 18 (31 Jan 2024 08:31) (17 - 18)  SpO2: 96% (31 Jan 2024 08:31) (94% - 96%)    Parameters below as of 31 Jan 2024 05:03  Patient On (Oxygen Delivery Method): room air  Mental Status: eyes open, alert, tracks examiner, no verbal output, does not follow simple or complex commands   Cranial Nerves: PERR, EOMI, blink to threat intact b/l. No facial droop. Tongue midline. Oculocephalic intact, corneal intact  Motor: Tone: increased. Strength: limited exam. 2/5 in UE and 1/5 in LE    Dysmetria: did not participate   Sensation: withdraws x 4 to all extremities  symmetrically     LABORATORY:  CBC                       12.7   5.42  )-----------( 352      ( 30 Jan 2024 06:24 )             38.2     Chem 01-31    141  |  102  |  20  ----------------------------<  114<H>  4.0   |  28  |  0.82    Ca    8.7      31 Jan 2024 06:27    TPro  7.1  /  Alb  3.6  /  TBili  0.3  /  DBili  x   /  AST  61<H>  /  ALT  91<H>  /  AlkPhos  84  01-31    LFTs LIVER FUNCTIONS - ( 31 Jan 2024 06:27 )  Alb: 3.6 g/dL / Pro: 7.1 g/dL / ALK PHOS: 84 U/L / ALT: 91 U/L / AST: 61 U/L / GGT: x           Coagulopathy   Lipid Panel   A1c   Cardiac enzymes     U/A Urinalysis Basic - ( 31 Jan 2024 06:27 )    Color: x / Appearance: x / SG: x / pH: x  Gluc: 114 mg/dL / Ketone: x  / Bili: x / Urobili: x   Blood: x / Protein: x / Nitrite: x   Leuk Esterase: x / RBC: x / WBC x   Sq Epi: x / Non Sq Epi: x / Bacteria: x        STUDIES & IMAGING:  Studies (EKG, EEG, EMG, etc):     Radiology (XR, CT, MR, U/S, TTE/MADYSON):

## 2024-01-31 NOTE — EEG REPORT - NS EEG TEXT BOX
DAY 1 	START: 1/30/2024  2:22:15 PM     	END: 1/31/2024  08:00  	DURATION: 17 HR  30 MIN    DAILY EEG VISUAL ANALYSIS    The background was continuous, symmetric, spontaneously variable and reactive. During wakefulness, there was no posterior dominant rhythm recorded. Low amplitude frontal beta was noted in wakefulness.   The anterior to posterior gradient was present.       BACKGROUND SLOWING:  Diffuse theta and delta frequencies were present.    FOCAL SLOWING:   None was present.    SLEEP BACKGROUND:  Drowsiness was characterized by fragmentation, attenuation, and slowing of the background activity.    Sleep was characterized by the presence of vertex waves, symmetric sleep spindles and K-complexes.    OTHER NON-EPILEPTIFORM FINDINGS:  None were present.  Diffuse excess beta activity.    ACTIVATION PROCEDURES:   Hyperventilation was not performed.    Photic stimulation was not performed.    INTERICTAL EPILEPTIFORM ACTIVITY:   Frequent bifrontal sharp waves, max F3/F4, often asymmetrical favoring left > right, at times in couple secs periodic bursts up to 1.5 hz.      EVENTS:  No events or seizures recorded.    ARTIFACTS:  Intermittent myogenic and movement artifacts were noted. At times; myogenic artifacts were limiting interpretation.     ECG:  The heart rate on single channel ECG was predominantly between xx BPM.    ASMs:     -------------------------------------------------------------------------------------------------------------------------------------------------------  EEG SUMMARY:  Abnormal EEG in the awake, drowsy and asleep states.  Frequent bifrontal sharp waves, max F3/F4, often asymmetrical favoring left > right, at times in couple secs periodic bursts up to 1.5 hz.  Moderate diffuse background slowing.    -------------------------------------------------------------------------------------------------------------------------------------------------------  IMPRESSION/CLINICAL CORRELATE:  This is an abnormal EEG record.   Increased risk for seizures, left frontal predominance of bifrontal discharges suggest likely left frontal focus with rapid synchrony.  Moderate diffuse cerebral dysfunction, nonspecific.   DAY 1 	START: 1/30/2024  2:22:15 PM     	END: 1/31/2024  08:00  	DURATION: 17 HR  30 MIN    DAILY EEG VISUAL ANALYSIS    The background was continuous, symmetric, spontaneously variable and reactive. During wakefulness, there was no posterior dominant rhythm recorded. Low amplitude frontal beta was noted in wakefulness.   The anterior to posterior gradient was present.       BACKGROUND SLOWING:  Diffuse theta and delta frequencies were present.    FOCAL SLOWING:   None was present.    SLEEP BACKGROUND:  Drowsiness was characterized by fragmentation, attenuation, and slowing of the background activity.    Sleep was characterized by the presence of vertex waves, symmetric sleep spindles and K-complexes.    OTHER NON-EPILEPTIFORM FINDINGS:  None were present.  Diffuse excess beta activity.    ACTIVATION PROCEDURES:   Hyperventilation was not performed.    Photic stimulation was not performed.    INTERICTAL EPILEPTIFORM ACTIVITY:   Frequent bifrontal sharp waves, max F3/F4, often asymmetrical favoring left > right, at times in couple secs periodic bursts up to 1.5 hz.      EVENTS:  No events or seizures recorded.    ARTIFACTS:  Intermittent myogenic and movement artifacts were noted. At times; myogenic artifacts were limiting interpretation.     ECG:  The heart rate on single channel ECG was predominantly between xx BPM.    -------------------------------------------------------------------------------------------------------------------------------------------------------  EEG SUMMARY:  Abnormal EEG in the awake, drowsy and asleep states.  Frequent bifrontal sharp waves, max F3/F4, often asymmetrical favoring left > right, at times in couple secs periodic bursts up to 1.5 hz.  Moderate diffuse background slowing.    -------------------------------------------------------------------------------------------------------------------------------------------------------  IMPRESSION/CLINICAL CORRELATE:  This is an abnormal EEG record.   Increased risk for seizures, left frontal predominance of bifrontal discharges suggest likely left frontal focus with rapid synchrony.  Moderate diffuse cerebral dysfunction, nonspecific.

## 2024-01-31 NOTE — PROGRESS NOTE ADULT - SUBJECTIVE AND OBJECTIVE BOX
DATE OF SERVICE: 01-31-24 @ 14:39    Patient is a 43y old  Female who presents with a chief complaint of EMU admission (31 Jan 2024 11:19)      SUBJECTIVE / OVERNIGHT EVENTS:  nad , eating well    MEDICATIONS  (STANDING):  cefTRIAXone   IVPB      cefTRIAXone   IVPB 1000 milliGRAM(s) IV Intermittent every 24 hours  dextrose 5%. 1000 milliLiter(s) (100 mL/Hr) IV Continuous <Continuous>  dextrose 5%. 1000 milliLiter(s) (50 mL/Hr) IV Continuous <Continuous>  dextrose 50% Injectable 25 Gram(s) IV Push once  dextrose 50% Injectable 25 Gram(s) IV Push once  dextrose 50% Injectable 12.5 Gram(s) IV Push once  enoxaparin Injectable 40 milliGRAM(s) SubCutaneous every 24 hours  ferrous    sulfate 325 milliGRAM(s) Oral daily  glucagon  Injectable 1 milliGRAM(s) IntraMuscular once  influenza   Vaccine 0.5 milliLiter(s) IntraMuscular once  lacosamide Solution 100 milliGRAM(s) Oral two times a day  polyethylene glycol 3350 17 Gram(s) Oral two times a day  senna 2 Tablet(s) Oral at bedtime  traZODone 100 milliGRAM(s) Oral at bedtime    MEDICATIONS  (PRN):  acetaminophen   Oral Liquid .. 650 milliGRAM(s) Oral every 6 hours PRN Temp greater or equal to 38C (100.4F)  dextrose Oral Gel 15 Gram(s) Oral once PRN Blood Glucose LESS THAN 70 milliGRAM(s)/deciliter      Vital Signs Last 24 Hrs  T(C): 36.3 (31 Jan 2024 13:12), Max: 36.7 (31 Jan 2024 05:03)  T(F): 97.4 (31 Jan 2024 13:12), Max: 98.1 (31 Jan 2024 05:03)  HR: 74 (31 Jan 2024 13:12) (57 - 87)  BP: 98/68 (31 Jan 2024 13:12) (98/62 - 116/68)  BP(mean): --  RR: 18 (31 Jan 2024 13:12) (17 - 18)  SpO2: 94% (31 Jan 2024 13:12) (94% - 96%)    Parameters below as of 31 Jan 2024 05:03  Patient On (Oxygen Delivery Method): room air      CAPILLARY BLOOD GLUCOSE        I&O's Summary    30 Jan 2024 07:01  -  31 Jan 2024 07:00  --------------------------------------------------------  IN: 0 mL / OUT: 200 mL / NET: -200 mL        PHYSICAL EXAM:  GENERAL: NAD, well-developed  HEAD:  Atraumatic, Normocephalic  EYES: EOMI, PERRLA, conjunctiva and sclera clear  NECK: Supple, No JVD  CHEST/LUNG: Clear to auscultation bilaterally; No wheeze  HEART: Regular rate and rhythm; No murmurs, rubs, or gallops  ABDOMEN: Soft, Nontender, Nondistended; Bowel sounds present  EXTREMITIES:  2+ Peripheral Pulses, No clubbing, cyanosis, or edema    NEUROLOGY: non-focal  SKIN: No rashes or lesions    LABS:                        12.7   5.42  )-----------( 352      ( 30 Jan 2024 06:24 )             38.2     01-31    141  |  102  |  20  ----------------------------<  114<H>  4.0   |  28  |  0.82    Ca    8.7      31 Jan 2024 06:27    TPro  7.1  /  Alb  3.6  /  TBili  0.3  /  DBili  x   /  AST  61<H>  /  ALT  91<H>  /  AlkPhos  84  01-31          Urinalysis Basic - ( 31 Jan 2024 06:27 )    Color: x / Appearance: x / SG: x / pH: x  Gluc: 114 mg/dL / Ketone: x  / Bili: x / Urobili: x   Blood: x / Protein: x / Nitrite: x   Leuk Esterase: x / RBC: x / WBC x   Sq Epi: x / Non Sq Epi: x / Bacteria: x        RADIOLOGY & ADDITIONAL TESTS:    Imaging Personally Reviewed:    Consultant(s) Notes Reviewed:      Care Discussed with Consultants/Other Providers:

## 2024-01-31 NOTE — PROGRESS NOTE ADULT - PROVIDER SPECIALTY LIST ADULT
Infectious Disease
Infectious Disease
Internal Medicine
Neurology
Internal Medicine
Neurology
Internal Medicine
Neurology
Internal Medicine

## 2024-01-31 NOTE — PROGRESS NOTE ADULT - ASSESSMENT
ASSESSMENT       IMPRESSION   Recurrent GTC events     RECOMMENDATION         Patient to be seen by team and attending. Note finalized upon attending attestation.  42 y/o female with PMH of Down syndrome, dementia, nonverbal, decreased mobility, and epilepsy who presents for elective EMU admission for increased seizure frequency. CODE BLUE in admitting for seizure activity, s/p 10mg of midazolam, 2mg of ativan, and keppra 1g. EEG report with increased risk for seizures left > right, but no seizure activity recorded. Episodes appear to primarily be behavioral. Tapered off home Keppra and continued on home vimpat. UA +, blood culture positive with gram neg rods. Ucx pending result. Febrile, no leukocytosis. Started on CTX. ID and IM consulted for reccs.     Impression: Myoclonic jerks with O2 desaturation with concern for seizures. EEG negative thus far for seizures, but patient at high risk for seizures. UA +     Plan:   1. Seizures   [x] EEG removed- explained to mother at bedside no further indication for EEG as monitoring has been unremarkable. Explained that events of diffuse body tremors likely related to distress, possibly related to pain/symptoms from cystitis- nonepileptic in nature.   [x] Monitor off home Keppra   [x] c/w home vimpat 100mg bid  [x] vimpat level 6.32 and keppra level 57.3  [] Start Onfi 10mg Qhs   [x] Rescue: 1st line: IV 1 mg Ativan, 2nd line IV Keppra 1g  [x] Maintain seizure and fall precautions   [x] neuro checks per routine     2. UTI, regors, possible urosepsis   [x] CTX for UTI (1/23- )  [x] UA + on 1/23   [x] blood culture + for gram neg rods  [x] admitted to medicine service  [x] CT abdomen/pelvis showing acute cystitis   [x] daily CBC to trend WBC count     3. Dysphagia  [x] failed swallow eval at admission    [x] tolerating Pureed diet  [x] c/w IVF for hydration     4. Hypernatremia  [x] resolved on d5      CORE MEASURES:        AED levels [x] Sent [] Pending [] Resulted     LFTs [] normal [x] elevated      Plan and education provided to [x] patient [x] mother at bedside via  seizure education provided.     Seizure Semiology  [x] Tonic clonic  [] Clonic  [x] Tonic  [] Unresponsive  [] Focal with impaired awareness  [] Focal without impaired awareness    Obtain screening lower extremity venous ultrasound in patients who meet 1 or more of the following criteria as patient is high risk for DVT/PE on admission:   [] History of DVT/PE  []Hypercoagulable states (Factor V Leiden, Cancer, OCP, etc. )  []Prolonged immobility (hemiplegia/hemiparesis/post operative or any other extended immobilization)  [] Transferred from outside facility (Rehab or Long term care)   42 y/o female with PMH of Down syndrome, dementia, nonverbal, decreased mobility, and epilepsy who presents for elective EMU admission for increased seizure frequency. CODE BLUE in admitting for seizure activity, s/p 10mg of midazolam, 2mg of ativan, and keppra 1g. EEG report with increased risk for seizures left > right, but no seizure activity recorded. Episodes appear to primarily be behavioral. Tapered off home Keppra and continued on home vimpat. UA +, blood culture positive with gram neg rods s/p abx treatment. Neurology re-consulted for frequent whole body jerking.     Impression:   Myoclonic jerks with O2 desaturation with concern for seizures. EEG negative thus far for seizures  EEG showing increased risk for seizures, left frontal predominance of bifrontal discharges  Decreased seizure threshold in setting of acute infection, now treated     Plan:   1. Myoclonus   [x] Repeat video EEG showing frequent myoclonic jerks   [ ] Start Onfi 5mg BID for myoclonus   [x] Continue home Vimpat 100mg BID  [x] vimpat level 6.32 and keppra level 57.3  [x] Rescue: 1st line: IV 1 mg Ativan, 2nd line IV Keppra 1g  [x] Maintain seizure and fall precautions   [ ] Follow outpatient with Neurology for continued management. If patient does not have a Neurologist, can follow at [address] [place] [address]. [address]. [place] [address].  Patient can call [phone] to schedule this appointment.    2. UTI, rigors, urosepsis   [x] UA + on 1/23   [x] blood culture positive  for gram neg rods  [x] CT abdomen/pelvis showing acute cystitis   [x] Antibiotic management per Medicine/ ID team     Case seen and discussed with Neurology attending Dr. Moses, please await attending attestation.  42 y/o female with PMH of Down syndrome, dementia, nonverbal, decreased mobility, and epilepsy who presents for elective EMU admission for increased seizure frequency. CODE BLUE in admitting for seizure activity, s/p 10mg of midazolam, 2mg of ativan, and keppra 1g. EEG report with increased risk for seizures left > right, but no seizure activity recorded. Episodes appear to primarily be behavioral. Tapered off home Keppra and continued on home vimpat. UA +, blood culture positive with gram neg rods s/p abx treatment. Neurology re-consulted for frequent whole body jerking.     Impression:   Myoclonic jerks with O2 desaturation with concern for seizures. EEG negative thus far for seizures  EEG showing increased risk for seizures, left frontal predominance of bifrontal discharges  Decreased seizure threshold in setting of acute infection, now treated     Plan:   1. Myoclonus   [x] Repeat video EEG showing frequent myoclonic jerks. Will discontinue EEG. No further inpatient neurological work-up at this time  [ ] Start Onfi 5mg BID for myoclonus   [x] Continue home Vimpat 100mg BID  [x] vimpat level 6.32 and keppra level 57.3  [x] Rescue: 1st line: IV 1 mg Ativan, 2nd line IV Keppra 1g  [x] Maintain seizure and fall precautions   [ ] Follow outpatient with Neurology for continued management. If patient does not have a Neurologist, can follow at [address] [place] [address]. [address]. [place] [address].  Patient can call [phone] to schedule this appointment.    2. UTI, rigors, urosepsis   [x] UA + on 1/23   [x] blood culture positive  for gram neg rods  [x] CT abdomen/pelvis showing acute cystitis   [x] Antibiotic management per Medicine/ ID team     Case seen and discussed with Neurology attending Dr. Moses, please await attending attestation.

## 2024-01-31 NOTE — PROGRESS NOTE ADULT - ASSESSMENT
44 y/o female with PMH of Down syndrome, dementia, nonverbal, decreased mobility, epilepsy who presents for elective EMU admission.    Epilepsy  - no acute seizures per neurology  - cont vimpat    myovlonic jerks  - spoke with neurology   - repeat eeg done  - start Clobazem 5 bid per neuro    fever metabolic encephalopathy  - UTI  -  sepsis  - Ecoli Bacteremia and cystitis  - c/w ceftriaxone  - follow up cultures  - ID is following  - plan to treat for 10 days with transition to PO amoxicillin/clavulanate 875 BID at discharge, end date: 2/2/24    dysphagia  - s/p swallow eval  -  puree diet    constipation  - senna and miralax    anemia  - iron studies c/w ACD  - c/w iron    dvt px  d/c home today  d/w mother

## 2024-01-31 NOTE — PROGRESS NOTE ADULT - REASON FOR ADMISSION
EMU admission

## 2024-02-18 NOTE — PHYSICAL THERAPY INITIAL EVALUATION ADULT - LEVEL OF INDEPENDENCE: SCOOT/BRIDGE, REHAB EVAL
Refill Routing Note   Medication(s) are not appropriate for processing by [facility] for the following reason(s):        Required vitals abnormal  Responsible provider unclear  Patient not seen by provider within 15 months    [facility] action(s):  Defer        Medication Therapy Plan: no pcp listed on profile      Appointments  past 12m or future 3m with PCP    Date Provider   Last Visit   9/20/2023 Bossman Abdi Jr., MD   Next Visit   Visit date not found Bossman Abdi Jr., MD   ED visits in past 90 days: 0        Note composed:2:44 PM 02/18/2024           contact guard

## 2024-02-26 ENCOUNTER — APPOINTMENT (OUTPATIENT)
Dept: NEUROLOGY | Facility: CLINIC | Age: 44
End: 2024-02-26
Payer: COMMERCIAL

## 2024-02-26 VITALS — SYSTOLIC BLOOD PRESSURE: 89 MMHG | DIASTOLIC BLOOD PRESSURE: 58 MMHG | HEART RATE: 85 BPM

## 2024-02-26 DIAGNOSIS — Q90.9 DOWN SYNDROME, UNSPECIFIED: ICD-10-CM

## 2024-02-26 PROCEDURE — 99213 OFFICE O/P EST LOW 20 MIN: CPT

## 2024-02-26 RX ORDER — LEVETIRACETAM 750 MG/1
750 TABLET, FILM COATED ORAL
Qty: 180 | Refills: 1 | Status: DISCONTINUED | COMMUNITY
End: 2024-02-26

## 2024-02-26 RX ORDER — ARIPIPRAZOLE 2 MG/1
2 TABLET ORAL TWICE DAILY
Qty: 180 | Refills: 1 | Status: DISCONTINUED | COMMUNITY
Start: 2023-12-11 | End: 2024-02-26

## 2024-02-26 RX ORDER — LORAZEPAM 0.5 MG/1
0.5 TABLET ORAL
Qty: 30 | Refills: 0 | Status: ACTIVE | COMMUNITY
Start: 2023-10-19 | End: 1900-01-01

## 2024-02-26 NOTE — HISTORY OF PRESENT ILLNESS
[FreeTextEntry1] : Rx:  FERROUS SULFATE 325MG (5GR) TABS: 1 each orally once a day  HYDROXYZINE HCL 25MG TABS (WHITE): 1 each orally 3 times a day, As Ne MULTIVITAMIN TABLETS: 1 each orally once a day  TRAZODONE 100MG TABLETS: 1 each orally once a day (at bedtime)  Lacosamide 100 mg twice daily 2023 Clobazam 5 mg twice daily 2024   Trials: levETIRAcetam 750 mg oral tablet, dispersible: 1 tab(s) orally every 12 hours discontinued due to concerns for behavioral changes DONEPEZIL 5MG TABLETS: 1 each orally once a day DC'd ARIPIPRAZOLE 2MG TABLETS: 1 each orally once a day   Update: Multiple messages and phone calls due to concern for seizure-like activity abnormal calling out at night and abnormal movements. Patient admitted to the EMU after coming to the emergency room with reported seizures for clarification of symptoms.  EEG did not show seizure activity with events of concern with appearing tense and calling out/crying lasting up to 30 minutes.  Events also of some hand flapping movements without abnormalities.  Some intermittent myoclonic jerks noted clinically. Some sharply contoured frontal sharp transients, possibly GPDs quasiperiodic near 1.5 Hz possibly epileptiform. Course complicated by community-acquired urinary tract infection, status post antibiotic course.  Still mainly in W/C.  leaning  while walking per aide and at times in chair. Less use of BR on her own, taking a lot of time in BR.  Concern for constipation, poor mobility. Still with episodes of trembling agitation at night  HPI: 43F with PMH of Down Syndrome, progressive dementia, cataracts, and epilepsy BIBEMS x2 to [facility] in OCT 2022 for seizures. (seen with fa/michelle ) Last time after witnessed 20-30 minute tonic clonic seizure. Seen by Neurology Dr. Cardenas EEG showed L>R frontal spike waves, frequent. Was on depakote from prior [facility] admission 9/2022, changed to Keppra at [facility] 10/2022 Hospital course c/b multifocal PNA, ileus, UTI UCX 50-90K Pseudomonas  Since then on LEV 750mg bid Tolerated.  Behavior: slight improvement lately per dad on aricept Tends to grab at things, fussy at times per dad. h/o fall 4 yrs ago, progressive decrease in speaking, no longer in school/program, not talking, not walking outdoors, walks with assist only short distance.  Dependent ADLs x few years.  Progressive decline.  No further overt seizure since.  FmHx: [place] SocHx: mom involved

## 2024-02-26 NOTE — DISCUSSION/SUMMARY
[Medically Refractory (seizure within the last year)] : Medically Refractory (seizure within the last year) [Secondary Generalization] : secondary generalization [Focal] : focal [Symptomatic] : symptomatic [Risks Associated with Driving/[place] Law] : As per my usual protocol, the patient was advised in regards to risks and driving privileges associated with the [place] Guidelines.  [Safety Recommendations] : The patient was advised in regards to the risk of seizures and general seizure safety recommendations including not to be bathing alone, climbing to high places and operating heavy machinery. [Compliance with Medications] : The importance of compliance with medications was reinforced. [Medication Side Effects] : High frequency and serious potential medication adverse effects were reviewed with the patient, including but not exclusive to psychiatric effects.  Information sheets on medication side effects were made available to the patient in our clinic.  The patient or advocate agrees to notify us for any concerns. [Sleep Hygiene/Sleep Disruption Risks] : Sleep hygiene and the risks of sleep disruption were discussed. [Risk of Death] : Risk of death associated with seizures / SUDEP was discussed. [FreeTextEntry1] : 41 year old F under evaluation for seizure disorder:  Differential diagnosis/localization: seizures secondary Risk factors:Downs, progressive dementia  - Other issues: sleep, behavioral issues baseline prior to use of LEV - Concern for functional decline associated with dementia of early onset in AD, no longer speaking x 3 yrs, not walking well x 1yr.  Gait apraxia, aphasia, cognitive decline  EMU evaluation 2024 with frontally predominant GPDs and myoclonus, likely associated with advanced Alzheimer's.  Events with calling out and crying , or arm flapping , not seizure related.  Plan: Current recommendations are to proceed with: -Lacosamide and clobazam.  Lacosamide may have poor efficacy versus myoclonus. -sz precautions -cont behavioral meds, aricept for now -trazadone 100mg qhs -avoid daytime napping -Concern for constipation per aide, straining, long use of BR each time.  Rec bowel regimen per PMD  x21min RTC 4-6mo

## 2024-02-26 NOTE — PHYSICAL EXAM
[FreeTextEntry1] : General: down's facies, in W/C Constitutional: alert and in no acute distress.  Psychiatric: calm, makes noises, not talking Neurologic:  Orientation: not Attention: distracted  Language: not speaking/talking, makes grunts Cranial Nerves: visual acuity intact bilaterally, visual fields full to confrontation, pupils equal round and reactive to light, extraocular motion intact, facial sensation intact symmetrically, face symmetrical, hearing was intact bilaterally, tongue and palate midline, head turning and shoulder shrug symmetric and there was no tongue deviation with protrusion.  Motor: muscle tone was increased in all four extremities, normal bulk in all four extremities.  Coordination: W/C, stands with assistance, apraxic gait, leans to left, no tremor Deep tendon reflexes:  Biceps right 2+. Biceps left 2+.   Triceps right 2+. Triceps left 2+.   Brachioradialis right 2+. Brachioradialis left 2+.   Patella right 2+. Patella left 2+.   Ankle jerk right 2+. Ankle jerk left 2+. +Grasp bilaterally.   Eyes: the sclera and conjunctiva were normal.  Neck: the appearance of the neck was normal.  Musculoskeletal: no clubbing or cyanosis of the fingernails.  Skin: no lesions, rash

## 2024-03-04 RX ORDER — TRAZODONE HYDROCHLORIDE 100 MG/1
100 TABLET ORAL
Qty: 90 | Refills: 1 | Status: ACTIVE | COMMUNITY
Start: 1900-01-01 | End: 1900-01-01

## 2024-03-28 RX ORDER — LACOSAMIDE 100 MG/1
100 TABLET ORAL
Qty: 180 | Refills: 1 | Status: ACTIVE | COMMUNITY
Start: 2023-11-01 | End: 1900-01-01

## 2024-03-29 DIAGNOSIS — R26.9 UNSPECIFIED ABNORMALITIES OF GAIT AND MOBILITY: ICD-10-CM

## 2024-03-29 DIAGNOSIS — F03.90 UNSPECIFIED DEMENTIA W/OUT BEHAVIORAL DISTURBANCE: ICD-10-CM

## 2024-03-29 DIAGNOSIS — F09 UNSPECIFIED MENTAL DISORDER DUE TO KNOWN PHYSIOLOGICAL CONDITION: ICD-10-CM

## 2024-03-29 DIAGNOSIS — G40.909 EPILEPSY, UNSPECIFIED, NOT INTRACTABLE, W/OUT STATUS EPILEPTICUS: ICD-10-CM

## 2024-05-20 RX ORDER — CLOBAZAM 10 MG/1
10 TABLET ORAL TWICE DAILY
Qty: 90 | Refills: 0 | Status: ACTIVE | COMMUNITY
Start: 2024-02-26 | End: 1900-01-01

## 2024-06-07 NOTE — PHYSICAL THERAPY INITIAL EVALUATION ADULT - PHYSICAL ASSIST/NONPHYSICAL ASSIST: SIT/SUPINE, REHAB EVAL
----- Message from Marine Bauman MD sent at 11/8/2023  1:23 PM CST -----  Can you call and see if she can come in tomorrow? Any available slot. Thanks!   Called to inform pt of below; no answer; LM to call office   verbal cues/1 person assist 06-Jun-2024

## 2024-07-25 NOTE — ED PROVIDER NOTE - WR ORDER ID 1
DATE OF SERVICE: 7/25/2024     Chief Complaint   Patient presents with   • Right Shoulder - Office Visit     F/u right shoulder  DOS 6-17-24  Starts PT on Aug 5th       HISTORY OF PRESENT ILLNESS:   The patient is a pleasant 62 year old female who presents for follow up status post surgery. She has been compliant with immobilizer. She starts physical therapy on August 5th.    REVIEW OF SYSTEMS:   No fevers or chills, night sweats. No other trauma.     MEDICATIONS AND ALLERGIES:   Reviewed in Epic to date.     PHYSICAL EXAMINATION:   GENERAL: Pleasant, in no acute distress. Alert and oriented x3.   VITALS: not currently breastfeeding.  MUSCULOSKELETAL: She has 60 ° of forward flexion. Internal rotation to her pocket.  NEUROLOGICAL: Sensation intact.  CARDIOVASCULAR: There is no clubbing, cyanosis or edema noted.  SKIN: Warm, dry and intact. No lesions or abrasions.    Labs:  No laboratory results for this encounter were reviewed.    DIAGNOSTIC DATA:  No imaging studies were reviewed.    ASSESSMENT AND PLAN:   This is a 62 year old female with:    1) Arthroscopic rotator cuff repair with 2 reverse mattress anchors, 1 Regeneten implant, right shoulder.  2) Arthroscopic subacromial decompression, right shoulder.  3) Arthroscopic right shoulder biceps tenodesis  -   She is doing well  -   She can discontinue immobilizer once she is 6 weeks post op.  -   She is going to start physical therapy as scheduled.  -   No lifting greater than 5 lbs.  -   Will consider a steroid injection at next visit.    Follow up in 6 weeks. If there are any questions or concerns prior to the next visit, she should feel free to give our office a call.    Orders:  No orders of the defined types were placed in this encounter.       On 7/25/2024, Tanja THOMAS MA scribed the services personally performed by Hector Devi MD  The documentation recorded by the scribe accurately and completely reflects the service(s) I personally performed and  the decisions made by me.        [id number]

## 2024-08-21 ENCOUNTER — RX RENEWAL (OUTPATIENT)
Age: 44
End: 2024-08-21

## 2024-09-02 ENCOUNTER — NON-APPOINTMENT (OUTPATIENT)
Age: 44
End: 2024-09-02

## 2024-09-03 ENCOUNTER — APPOINTMENT (OUTPATIENT)
Dept: NEUROLOGY | Facility: CLINIC | Age: 44
End: 2024-09-03
Payer: COMMERCIAL

## 2024-09-03 VITALS
WEIGHT: 95 LBS | HEIGHT: 58 IN | DIASTOLIC BLOOD PRESSURE: 55 MMHG | SYSTOLIC BLOOD PRESSURE: 90 MMHG | HEART RATE: 68 BPM | BODY MASS INDEX: 19.94 KG/M2

## 2024-09-03 DIAGNOSIS — G40.909 EPILEPSY, UNSPECIFIED, NOT INTRACTABLE, W/OUT STATUS EPILEPTICUS: ICD-10-CM

## 2024-09-03 DIAGNOSIS — Q90.9 DOWN SYNDROME, UNSPECIFIED: ICD-10-CM

## 2024-09-03 DIAGNOSIS — Z51.81 ENCOUNTER FOR THERAPEUTIC DRUG LVL MONITORING: ICD-10-CM

## 2024-09-03 DIAGNOSIS — F03.90 UNSPECIFIED DEMENTIA W/OUT BEHAVIORAL DISTURBANCE: ICD-10-CM

## 2024-09-03 DIAGNOSIS — Z00.00 ENCOUNTER FOR GENERAL ADULT MEDICAL EXAMINATION W/OUT ABNORMAL FINDINGS: ICD-10-CM

## 2024-09-03 PROCEDURE — G2211 COMPLEX E/M VISIT ADD ON: CPT

## 2024-09-03 PROCEDURE — 99214 OFFICE O/P EST MOD 30 MIN: CPT

## 2024-09-03 NOTE — DISCUSSION/SUMMARY
[Medically Refractory (seizure within the last year)] : Medically Refractory (seizure within the last year) [Secondary Generalization] : secondary generalization [Focal] : focal [Symptomatic] : symptomatic [Risks Associated with D[place] Law] : As per my usual protocol, the patient was advised in regards to risks and driving privileges associated with the [place] Guidelines.  [Safety Recommendations] : The patient was advised in regards to the risk of seizures and general seizure safety recommendations including not to be bathing alone, climbing to high places and operating heavy machinery. [Compliance with Medications] : The importance of compliance with medications was reinforced. [Medication Side Effects] : High frequency and serious potential medication adverse effects were reviewed with the patient, including but not exclusive to psychiatric effects.  Information sheets on medication side effects were made available to the patient in our clinic.  The patient or advocate agrees to notify us for any concerns. [Sleep Hygiene/Sleep Disruption Risks] : Sleep hygiene and the risks of sleep disruption were discussed. [Risk of Death] : Risk of death associated with seizures / SUDEP was discussed. [FreeTextEntry1] : BRENDA RUSSO, 42 yo PMH of Down Syndrome, progressive dementia, cataracts, and epilepsy  post hospital admission, 2 sz in the setting of UTI/pneumonia/sepsis.  EMU evaluation 2024 with frontally predominant GPDs and myoclonus, likely associated with advanced Alzheimer's.    pt undergoing Antib treatment.   Plan: -Continue Lacosamide and Clobazam same doses Explained sz were provoked by infections - Renewed Lorazepam for sz as needed for sz sent RX - placed bw levels to assess compliance.  - Continue trazadone 100mg qhs - sz precautions discussed -  all questions answered  - Follow up with  today   - RTC Jan 2025 with Dr Baum. knows to call if any concerns.    x31min

## 2024-09-03 NOTE — HISTORY OF PRESENT ILLNESS
[FreeTextEntry1] : Current medications: Lacosamide 100 mg bid Clobazam 5 mg bid TRAZODONE 100 mg qhs    Prior:   mg bid discontinued due to concerns for behavioral changes Depakote DONEPEZIL 5MG TABLETS: 1 each orally once a day DC'd ARIPIPRAZOLE 2MG TABLETS: 1 each orally once a day    *** 09/03/2024*** BRENDA RUSSO, 42 yo PMH of Down Syndrome, progressive dementia, cataracts, and epilepsy here with both parents, [place] speaking (pacific interpreters services used), and home care aid Mary Jo, Pt is post admission to [facility] , 08/24-08/27/2024 had 2 sz witnessed by mother, full body convulsions, in the setting of sepsis sec to UTI, pneumonia. NO ASM changes, CT head 08/24/24 no acute abnormalities, positive UA, CT chest with b/l infiltrates. 8/26/2024 EEG no seizures but abnormal cerbral dysfunction.  Now in levofloxacin, has PCP chelsie later today. Undergoes speech and swallow eval.    *** 02/26/2024 *** w/ dr Baum Update: Multiple messages and phone calls due to concern for seizure-like activity abnormal calling out at night and abnormal movements. Patient admitted to the EMU after coming to the emergency room with reported seizures for clarification of symptoms.  EEG did not show seizure activity with events of concern with appearing tense and calling out/crying lasting up to 30 minutes.  Events also of some hand flapping movements without abnormalities.  Some intermittent myoclonic jerks noted clinically. Some sharply contoured frontal sharp transients, possibly GPDs quasiperiodic near 1.5 Hz possibly epileptiform. Course complicated by community-acquired urinary tract infection, status post antibiotic course.  Still mainly in W/C.  leaning  while walking per aide and at times in chair. Less use of BR on her own, taking a lot of time in BR.  Concern for constipation, poor mobility. Still with episodes of trembling agitation at night  HPI: 43F with PMH of Down Syndrome, progressive dementia, cataracts, and epilepsy BIBEMS x2 to [facility] in OCT 2022 for seizures. (seen with fa/michelle ) Last time after witnessed 20-30 minute tonic clonic seizure. Seen by Neurology Dr. Cardenas EEG showed L>R frontal spike waves, frequent. Was on depakote from prior [facility] admission 9/2022, changed to Keppra at [facility] 10/2022 Hospital course c/b multifocal PNA, ileus, UTI UCX 50-90K Pseudomonas  Since then on LEV 750mg bid Tolerated.  Behavior: slight improvement lately per dad on aricept Tends to grab at things, fussy at times per dad. h/o fall 4 yrs ago, progressive decrease in speaking, no longer in school/program, not talking, not walking outdoors, walks with assist only short distance.  Dependent ADLs x few years.  Progressive decline. No further overt seizure since.  FmHx: [place] SocHx: mom involved

## 2024-09-03 NOTE — REASON FOR VISIT
[Follow-Up: _____] : a [unfilled] follow-up visit [Parent] : parent [Formal Caregiver] : formal caregiver [[employer] ] : provided by [employer]   [Time Spent: ____ minutes] : Total time spent using  services: [unfilled] minutes. The patient's primary language is not English thus required  services. [Interpreters_IDNumber] : [id number] [Interpreters_FullName] : Declan

## 2024-09-03 NOTE — PHYSICAL EXAM
[FreeTextEntry1] : General: down's facies, in W/C Constitutional: alert and in no acute distress.  Psychiatric: calm, makes noises, not talking Neurologic:  Orientation: not Attention: distracted  Language: not speaking/talking, makes grunts Cranial Nerves: unable to assess Coordination: W/C,  no tremors noted DTR: unable to assess Eyes: the sclera and conjunctiva were normal.  Neck: the appearance of the neck was normal.  Musculoskeletal: no clubbing or cyanosis of the fingernails.  Skin: no lesions, rash

## 2024-09-09 LAB
CLOBAZAM + NOR PNL SERPL: 202 NG/ML
DESMETHYLCLOBAZAM: 702 NG/ML
LACOSAMIDE (VIMPAT): 8.72 UG/ML

## 2024-10-09 ENCOUNTER — NON-APPOINTMENT (OUTPATIENT)
Age: 44
End: 2024-10-09

## 2025-01-01 NOTE — PATIENT PROFILE ADULT - HAVE YOU EXPERIENCED VIOLENCE OR A TRAUMATIC EVENT?
ENDOCRINOLOGY DM CONSULT NOTE   Date of Service: 2025    A consult was requested for Manny Griffin, a 14 year old old male, who was admitted on 2024.   Medical Records Reviewed: [facility] inpatient records     CARE TEAM   Primary Care Physician: Pk Loyola MD    CHIEF COMPLAINT/REASON FOR CONSULTATION   Type 1 diabetes with hyperglycemia    HISTORY OF PRESENT ILLNESS  Manny Griffin is a 14 year old male with type 1 diabetes mellitus who was admitted to [facility]  with hyperglycemia and ketonemia. His diabetes is managed with Ominpod insulin pump. He is visiting  during the holidays and ran out of insulin.  He started presenting headaches, fatigue, nausea and presented to ED.  On arrival to ED, POC glucose was > 600 mg/dL.  CMP with glucose of 688 mg/dL, Na: 128, bicarb : 17, pH: 7.3, high beta hydroxybutyrate.  He received NS bolus, and then started on subcutaneous insulin.   Received Humalog for corrections every 2h and dextrose free IV Fluids. This morning glucose was 82 mg/dL, normal electrolytes. Bicarb: 24.     During rounds, Manny denies any headaches, vision changes, nausea or abdominal pain.  He does not have his insulin pump with him.     Diabetes History:  Diagnosed: 2020  Endocrinology follow-up: Dr. Kauffman at [facility]     Last visit: 10/24/2024   Last HbA1c: 7.9%  Insulin regimen:   Pump type:  Omnipod 5       Since: 2024  Basal rates (units/h):  1.2 u/h  Carb ratio: 1:10 g  Correction Factor:  40 Target B mg/dL      PAST MEDICAL HISTORY  Past Medical History:   Diagnosis Date    ADHD     Diabetes  (CMD)     Murmur        PAST SURGICAL HISTORY  History reviewed. No pertinent surgical history.      FAMILY HISTORY  History reviewed. No pertinent family history.      SOCIAL HISTORY  Social History     Social History Narrative    Not on file     ALLERGIES & DRUG REACTIONS  ALLERGIES:  No Known Allergies  Medications Prior to Admission   Medication Sig Dispense Refill    insulin  lispro 100 units/mL subcutaneous pump by Subcutaneous Infusion route continuous. Discrete pump settings are determined through active management with physician and documented within clinical n   Pump : CC video  Outpatient Prescribing/Managing Provider: Hima Kauffman  Pump currently running? No       Current Facility-Administered Medications   Medication Dose Route Frequency Provider Last Rate Last Admin    lidocaine (ANECREAM/LMX) 4 % cream 1 application.  1 application. Topical PRN Dennis Lopez, DO        sodium chloride 0.9 % injection 0.6-4.6 mL  0.6-4.6 mL Intravenous PRN Fely Lopezani, DO        sodium chloride 0.9 % flush bag 25 mL  25 mL Intravenous PRN Fely Lopezani, DO        sodium chloride 0.9 % injection 0.5-10 mL  0.5-10 mL Intravenous PRN Dennis Lopez, DO        dextrose (GLUTOSE) 40 % gel 15 g  15 g Oral PRN Dennis Lopez, DO        dextrose 50 % injection 25 g  25 g Intravenous PRN Dennis Lopez, DO        glucagon (GLUCAGEN) injection 1 mg  1 mg Intramuscular PRN Dennis Lopez, DO        insulin glargine (LANTUS) injection 29 Units  29 Units Subcutaneous Nightly Dennis Lopez, DO        insulin lispro (ADMELOG,HumaLOG) scheduled AND correction dose   Subcutaneous PRN Dennis Lopez, DO   2.5 Units at 01/01/25 0340    insulin lispro (ADMELOG,HumaLOG) scheduled AND correction dose   Subcutaneous TID WC Dennis Lopez, DO   4.5 Units at 01/01/25 0818    insulin lispro (ADMELOG,HumaLOG) scheduled AND correction dose   Subcutaneous PRN Dennis Lopez, DO        acetaminophen (TYLENOL) tablet 650 mg  650 mg Oral Q6H PRN Nigel Andrade, DO             REVIEW OF SYSTEMS   Review of Systems   Constitutional: . + fatigue. Negative for fever.  Endocrine: + hyperglycemia, + polydipsia  HENT: Negative for congestion and rhinorrhea.   Eyes: Negative for visual disturbance   Respiratory: Negative for cough.   Cardiovascular: Negative.   Gastrointestinal:  Negative for abdominal pain, diarrhea or constipation.      Positive for nausea-  resolved   Genitourinary: + polyuria, nocturia  Musculoskeletal: Negative for arthralgias.   Skin: Negative for rash.   Neurological: Negative for light-headedness      Positive for headaches- resolved  Psychiatric/Behavioral: Negative for behavioral problems.  The remainder of the 10 system review of systems is negative        OBJECTIVE  VITALS  Visit Vitals  /61 (BP Location: LUE - Left upper extremity, Patient Position: Semi-Rangel's)   Pulse 92   Temp 97.9 °F (36.6 °C) (Temporal)   Resp 18   Ht 5' 8.5\" (1.74 m)   Wt 78.2 kg (172 lb 6.4 oz)   SpO2 99%   BMI 25.83 kg/m²     77 %ile (Z= 0.72) based on CDC (Boys, 2-20 Years) Stature-for-age data based on Stature recorded on 12/31/2024.  96 %ile (Z= 1.72) based on CDC (Boys, 2-20 Years) weight-for-age data using data from 12/31/2024.  Body mass index is 25.83 kg/m². 93.7 %ile (Z= 1.53) based on CDC (Boys, 2-20 Years) BMI-for-age based on BMI available on 12/31/2024.    Physical Exam   Constitutional: Appears well-developed and well-nourished. No acute distress.   HENT:   Mouth/Throat: Mucous membranes are moist, lips are dry.   Eyes: Conjunctivae and EOM are normal. Pupils are equal, round, and reactive to light.   Neck: Neck supple. No adenopathy. No thyromegaly   Cardiovascular: Tachycardic and regular rhythm. Pulses are strong. No murmur heard. Brisk capillary refill.   Pulmonary/Chest: Effort normal. There is normal air entry. There are no wheezes.  Abdominal: Soft, no distension. There is no hepatosplenomegaly. There is no tenderness.   Genitourinary: Deferred   Musculoskeletal: Normal range of motion. There is no edema and no tenderness.   Neurological: Alert. Exhibits normal muscle tone. 2+ Patellar DTRs.   Skin: Skin is warm. No rash noted.No lipohypertrophy at injection sites      REVIEW OF LABORATORY DATA   I have reviewed the following:     Latest Reference Range &  Units 12/31/24 18:18 12/31/24 18:20   FIO2 - RESPIRATORY % 21    PH, VENOUS - RESPIRATORY 7.35 - 7.45 Units 7.30 (L)    PCO2, VENOUS - RESPIRATORY 41 - 54 mm Hg 35 (L)    PO2, VENOUS - RESPIRATORY 35 - 42 mm Hg 62 (H)    HCO3, VENOUS - RESPIRATORY 22.0 - 28.0 mmol/L 17.0 (L)    O2 CONTENT, VENOUS - RESPIRATORY % 20    BASE EXCESS / DEFICIT, VENOUS - RESPIRATORY -2 - 2 mmol/L -8 (L)    O2 SATURATION, VENOUS - RESPIRATORY 60 - 80 % 90 (H)    OXYHEMOGLOBIN, VENOUS - RESPIRATORY 60.0 - 80.0 % 88.0 (H)    CARBOXYHEMOGLOBIN - RESPIRATORY <2.1 % 1.5    METHEMOGLOBIN - RESPIRATORY <=1.6 % 1.1    SITE - RESPIRATORY  Venous    Sodium 135 - 145 mmol/L  128 (L)   SODIUM - RESPIRATORY 135 - 145 mmol/L 127 (L)    Potassium 3.4 - 5.1 mmol/L  5.0   POTASSIUM - RESPIRATORY 3.4 - 5.1 mmol/L 5.5 (H)    Chloride 97 - 110 mmol/L  88 (L)   CO2 21 - 32 mmol/L  17 (L)   ANION GAP 7 - 19 mmol/L  28 (H)   Glucose 70 - 99 mg/dL  688 (HH)   BUN 6 - 20 mg/dL  19   Creatinine 0.38 - 1.15 mg/dL  1.03   BUN/CREATININE RATIO 7 - 25   18   Glomerular Filtration Rate   See Comment   CALCIUM 8.0 - 11.0 mg/dL  10.8   CALCIUM, IONIZED - RESPIRATORY 1.15 - 1.29 mmol/L 1.29    MAGNESIUM 1.7 - 2.4 mg/dL  2.5 (H)   PHOSPHORUS 2.9 - 5.4 mg/dL  6.8 (H)   TOTAL BILIRUBIN 0.2 - 1.0 mg/dL  1.3 (H)   AST/SGOT 10 - 45 Units/L  24   ALT/SGPT 10 - 50 Units/L  20   ALK PHOSPHATASE 110 - 450 Units/L  213   Albumin 3.4 - 5.0 g/dL  4.9   GLOBULIN 2.0 - 4.0 g/dL  4.4 (H)   A/G Ratio, Serum 1.0 - 2.4   1.1   AMYLASE 25 - 115 Units/L  37   Lipase 15 - 77 Units/L  26   TOTAL PROTEIN 6.0 - 8.0 g/dL  9.3 (H)   Beta Hydroxybutyrate 0.0 - 0.3 mmol/L  6.6 (H)   LACTIC ACID, VENOUS - RESPIRATORY <2.0 mmol/L 2.7 (HH)    WBC 4.2 - 11.0 K/mcL  10.3   RBC 3.90 - 5.30 mil/mcL  5.33 (H)   HGB 13.0 - 17.0 g/dL  15.2   HEMOGLOBIN - RESPIRATORY 13.0 - 17.0 g/dL 16.0    HCT 39.0 - 51.0 %  44.6   MCV 78.0 - 100.0 fl  83.7   MCH 26.0 - 34.0 pg  28.5   MCHC 32.0 - 36.5 g/dL  34.1   RDW-SD  35.0 - 47.0 fL  35.2   RDW-CV 11.0 - 15.0 %  11.8    - 450 K/mcL  561 (H)   NRBC <=0 /100 WBC  0   Neutrophil %  68   LYMPH %  23   MONO %  8   EOSIN %  0   BASO %  1   Absolute Neutrophil 1.8 - 8.0 K/mcL  7.0   Absolute Lymph 1.5 - 6.5 K/mcL  2.3   Absolute Mono 0.0 - 0.8 K/mcL  0.8   Absolute Eos 0.0 - 0.5 K/mcL  0.0   Absolute Baso 0.0 - 0.2 K/mcL  0.1   Immature Granulocytes %  0   Absolute Immature Granulocytes 0.0 - 0.2 K/mcL  0.0   GLYCOHEMOGLOBIN A1C 4.5 - 5.6 %  6.8 (H)   (HH): Data is critically high  (L): Data is abnormally low  (H): Data is abnormally high     Latest Reference Range & Units 01/01/25 07:23 01/01/25 07:32   PH, VENOUS - RESPIRATORY 7.35 - 7.45 Units  7.39   PCO2, VENOUS - RESPIRATORY 41 - 54 mm Hg  37 (L)   PO2, VENOUS - RESPIRATORY 35 - 42 mm Hg  70 (H)   HCO3, VENOUS - RESPIRATORY 22.0 - 28.0 mmol/L  22.0   BASE EXCESS / DEFICIT, VENOUS - RESPIRATORY -2 - 2 mmol/L  -2   O2 SATURATION, VENOUS - RESPIRATORY 60 - 80 %  95 (H)   SITE - RESPIRATORY   Venous   Sodium 135 - 145 mmol/L 138    SODIUM - RESPIRATORY 135 - 145 mmol/L  135   Potassium 3.4 - 5.1 mmol/L 3.8    POTASSIUM - RESPIRATORY 3.4 - 5.1 mmol/L  4.0   Chloride 97 - 110 mmol/L 102    CO2 21 - 32 mmol/L 24    ANION GAP 7 - 19 mmol/L 16    Glucose 70 - 99 mg/dL 85    BUN 6 - 20 mg/dL 18    Creatinine 0.38 - 1.15 mg/dL 0.82    BUN/CREATININE RATIO 7 - 25  22    Glomerular Filtration Rate  See Comment    CALCIUM 8.0 - 11.0 mg/dL 9.2    CALCIUM, IONIZED - RESPIRATORY 1.15 - 1.29 mmol/L  1.23   (L): Data is abnormally low  (H): Data is abnormally high    I have reviewed Epic blood glucose log through 1/1/2025 12:49 PM.     Recent Labs   Lab 12/31/24  1815 12/31/24  1939 12/31/24  2119 12/31/24  2217 01/01/25  0055 01/01/25  0326 01/01/25  0537 01/01/25  0811 01/01/25  1237   GLUCOSE BEDSIDE >600* >600* >600* 508* 358* 218* 127* 82 204*                 ASSESSMENT & PLAN  Patient Active Problem List   Diagnosis    Insulin dependent  diabetes mellitus type IA  (CMD)    Hyperkalemia    Hyperglycemia     Manny is a 14 year old male with type 1 diabetes mellitus presenting with hyperglycemia and ketonemia, now improving.       Recommendations:     - Check BG qAC, qsnack, bedtime, 2 AM    - Insulin dosing:    - Lantus dose 29 units daily   - Humalog dose is carbohydrate dose plus blood sugar correction    - Insulin to carbohydrate ratio: 1 unit per 10 gm carbohydrate intake    - Blood sugar corrections (BG-120)/40    - Corrections should not be given more frequently than every 2 hours    - Will transition to insulin pump closer to discharge     - Please contact endocrinology with trends of high or low blood sugars    - Will follow closely      Suad Peterson MD  Pediatric Endocrinology                no

## 2025-01-29 ENCOUNTER — APPOINTMENT (OUTPATIENT)
Dept: NEUROLOGY | Facility: CLINIC | Age: 45
End: 2025-01-29
Payer: COMMERCIAL

## 2025-01-29 VITALS — HEART RATE: 70 BPM | DIASTOLIC BLOOD PRESSURE: 65 MMHG | SYSTOLIC BLOOD PRESSURE: 97 MMHG

## 2025-01-29 DIAGNOSIS — G40.909 EPILEPSY, UNSPECIFIED, NOT INTRACTABLE, W/OUT STATUS EPILEPTICUS: ICD-10-CM

## 2025-01-29 DIAGNOSIS — R26.9 UNSPECIFIED ABNORMALITIES OF GAIT AND MOBILITY: ICD-10-CM

## 2025-01-29 DIAGNOSIS — F03.90 UNSPECIFIED DEMENTIA W/OUT BEHAVIORAL DISTURBANCE: ICD-10-CM

## 2025-01-29 DIAGNOSIS — Q90.9 DOWN SYNDROME, UNSPECIFIED: ICD-10-CM

## 2025-01-29 DIAGNOSIS — F09 UNSPECIFIED MENTAL DISORDER DUE TO KNOWN PHYSIOLOGICAL CONDITION: ICD-10-CM

## 2025-01-29 PROCEDURE — 99215 OFFICE O/P EST HI 40 MIN: CPT

## 2025-01-29 PROCEDURE — G2211 COMPLEX E/M VISIT ADD ON: CPT

## 2025-06-10 ENCOUNTER — NON-APPOINTMENT (OUTPATIENT)
Age: 45
End: 2025-06-10

## 2025-06-11 ENCOUNTER — APPOINTMENT (OUTPATIENT)
Dept: NEUROLOGY | Facility: CLINIC | Age: 45
End: 2025-06-11
Payer: COMMERCIAL

## 2025-06-11 VITALS
HEIGHT: 58 IN | WEIGHT: 95 LBS | HEART RATE: 66 BPM | SYSTOLIC BLOOD PRESSURE: 106 MMHG | DIASTOLIC BLOOD PRESSURE: 82 MMHG | BODY MASS INDEX: 19.94 KG/M2

## 2025-06-11 PROBLEM — G24.9 DYSTONIA: Status: ACTIVE | Noted: 2025-06-11

## 2025-06-11 PROBLEM — G25.3 MYOCLONUS: Status: ACTIVE | Noted: 2025-06-11

## 2025-06-11 PROCEDURE — 99215 OFFICE O/P EST HI 40 MIN: CPT

## 2025-06-11 PROCEDURE — G2211 COMPLEX E/M VISIT ADD ON: CPT

## 2025-06-11 RX ORDER — CLOBAZAM 2.5 MG/ML
10 SUSPENSION ORAL
Qty: 360 | Refills: 0 | Status: ACTIVE | COMMUNITY
Start: 2025-06-11 | End: 1900-01-01

## 2025-06-11 NOTE — ED PROVIDER NOTE - CROS ED ROS STATEMENT
RN called and informed Mom of XR Results.  Per MD, Good news, no fracture, and no clear evidence that the infection goes to the bone.  Hopefully the antibiotics will be enough to take care of it, but please let me know if it does not improve significantly over the next 2 weeks.  Mom verbalized understanding.   all other ROS negative except as per HPI 12-Nov-2022